# Patient Record
Sex: FEMALE | Race: WHITE | Employment: OTHER | URBAN - METROPOLITAN AREA
[De-identification: names, ages, dates, MRNs, and addresses within clinical notes are randomized per-mention and may not be internally consistent; named-entity substitution may affect disease eponyms.]

---

## 2017-02-01 ENCOUNTER — OFFICE VISIT (OUTPATIENT)
Dept: CARDIOLOGY CLINIC | Age: 72
End: 2017-02-01

## 2017-02-01 VITALS
OXYGEN SATURATION: 97 % | WEIGHT: 128.4 LBS | RESPIRATION RATE: 16 BRPM | BODY MASS INDEX: 21.39 KG/M2 | HEIGHT: 65 IN | HEART RATE: 62 BPM | SYSTOLIC BLOOD PRESSURE: 150 MMHG | DIASTOLIC BLOOD PRESSURE: 68 MMHG

## 2017-02-01 DIAGNOSIS — I25.10 ATHEROSCLEROSIS OF NATIVE CORONARY ARTERY OF NATIVE HEART WITHOUT ANGINA PECTORIS: Primary | ICD-10-CM

## 2017-02-01 DIAGNOSIS — J44.9 CHRONIC OBSTRUCTIVE PULMONARY DISEASE, UNSPECIFIED COPD TYPE (HCC): ICD-10-CM

## 2017-02-01 DIAGNOSIS — E78.2 MIXED HYPERLIPIDEMIA: ICD-10-CM

## 2017-02-01 RX ORDER — LAMOTRIGINE 25 MG/1
TABLET ORAL
Refills: 0 | COMMUNITY
Start: 2017-01-23 | End: 2017-02-21

## 2017-02-01 NOTE — PATIENT INSTRUCTIONS
Obtain fasting lipids, we will call you with results    Keep a blood pressure diary, notify us f consistently above 140    Follow up with Dr. Clemente Stephenson in 6 months

## 2017-02-01 NOTE — PROGRESS NOTES
Visit Vitals    /68 (BP 1 Location: Left arm, BP Patient Position: Sitting)    Pulse 62    Resp 16    Ht 5' 5\" (1.651 m)    Wt 128 lb 6.4 oz (58.2 kg)    SpO2 97%    BMI 21.37 kg/m2

## 2017-02-01 NOTE — PROGRESS NOTES
Sharolyn Landau     1945       Ofelia Ocampo MD, [facility]  Date of Visit-2/1/2017   PCP is Mya Pabon MD   [facility]  [facility]  HPI:  Sharolyn Landau is a 66 y.o. female   No complaints  SOB unchanged  Denies chest pain, edema, syncope  Has no tachycardia , palpitations or sense of arrythmia   EKG NSR WNL normal axis and intervals rate 62 qrs 435  Assessment/Plan:     CAD 3 years out from stents  --continue DAPT  --BB,ASA-nuke Jan 2016  lexiscan 2 mm Depression, no ischemia, EF 73%    HTN up today recheck i get about the same systolic 477 for me, pt will keep bp diary at home , let us know if >140 persistently    Lipids -FLP slip given  COPD chronic SOB URBANO  Fu 6 months     Impression:   1. Atherosclerosis of native coronary artery of native heart without angina pectoris    2. Mixed hyperlipidemia    3. Chronic obstructive pulmonary disease, unspecified COPD type ([address]    4. Elevated BP       Cardiac History:   NUKE 3-1-13= walked 5 minutes, STT 3 mm horizontal, normal perfusion, ef 70%  ECHO 3-1-13= 55-60%, mild MR,AR,TR, Aortic sclerosis without stenosis  Cardiac Cath 1-8-14-Successful GORDON pLAD (2.75x18 Xience). RFA mynx, RFV manual.     ROS-except as noted above. . A complete cardiac and respiratory are reviewed and negative except as above ; Resp-denies wheezing  or productive cough,. Const- No unusual weight loss or fever; Neuro-no recent seizure or CVA ; GI- No BRBPR, abdom pain, bloating ; - no  hematuria   see supplement sheet, initialed and to be scanned by staff  Past Medical History   Diagnosis Date    COPD (chronic obstructive pulmonary disease) ([facility] 1/7/2016    Coronary atherosclerosis of native coronary artery 2006     MI/Left ventricular wall motion is segmentally abnormal with mild hypokinesis of the anterior wall. Ejection Fraction is estimated at 50%. LAD >90% stenosis. LCX 50% stenosis.      Family history of suicide father    GERD (gastroesophageal reflux disease)     Glaucoma     Mixed hyperlipidemia     Osteopenia 2015     DEXA 2010, 2015    S/P angioplasty with stent      stent to LAD. 3X8mm Cypher. Social Hx= reports that she has never smoked. She has never used smokeless tobacco. She reports that she does not drink alcohol or use illicit drugs. Exam and Labs:    Visit Vitals    /68 (BP 1 Location: Left arm, BP Patient Position: Sitting)    Pulse 62    Resp 16    Ht 5' 5\" (1.651 m)    Wt 128 lb 6.4 oz (58.2 kg)    SpO2 97%    BMI 21.37 kg/m2      Constitutional:  NAD, comfortable  Head: NC,AT. Eyes: No scleral icterus. Neck:  Neck supple. No JVD present. Throat: moist mucous membranes. Chest: Effort normal & normal respiratory excursion . Neurological: alert, conversant and oriented . Skin: Skin is not cold. No obvious systemic rash noted. Not diaphoretic. No erythema. Psychiatric:  Grossly normal mood and affect. Behavior appears normal. Extremities:  no clubbing or cyanosis. Abdomen: non distended    Lungs:breath sounds normal. No stridor. distress, wheezes or  Rales. Heart:    normal rate, regular rhythm, normal S1, S2, no murmurs, rubs, clicks or gallops , PMI non displaced. Edema: Edema is none. Lab Results   Component Value Date/Time    Cholesterol, total 141 07/13/2015 09:17 AM    HDL Cholesterol 38 07/13/2015 09:17 AM    LDL, calculated 82 07/13/2015 09:17 AM    Triglyceride 104 07/13/2015 09:17 AM    CHOL/HDL Ratio 3.8 01/09/2014 04:20 AM        Wt Readings from Last 3 Encounters:   02/01/17 128 lb 6.4 oz (58.2 kg)   07/20/16 124 lb 3.2 oz (56.3 kg)   01/04/16 128 lb 3.2 oz (58.2 kg)      BP Readings from Last 3 Encounters:   02/01/17 150/68   07/20/16 138/62   01/04/16 130/60      Current Outpatient Prescriptions   Medication Sig    clopidogrel (PLAVIX) 75 mg tablet Take 1 Tab by mouth daily.     metoprolol tartrate (LOPRESSOR) 25 mg tablet TAKE 1 TABLET TWICE DAILY    simvastatin (ZOCOR) 40 mg tablet TAKE 1 TABLET EVERY NIGHT    famotidine (PEPCID) 20 mg tablet Take 20 mg by mouth two (2) times a day.  escitalopram oxalate (LEXAPRO) 20 mg tablet Take 20 mg by mouth daily.  Aspirin, Buffered 81 mg tab Take  by mouth.  calcium-cholecalciferol, d3, (CALCIUM 600 + D) 600-125 mg-unit tab Take 1 Tab by mouth two (2) times a day.  latanoprost (XALATAN) 0.005 % ophthalmic solution Administer 1 Drop to both eyes nightly.  lamoTRIgine (LAMICTAL) 25 mg tablet take 1 tablet by mouth at bedtime for 1 week then INCREASE to 2 tablets by mouth at bedtime     No current facility-administered medications for this visit. Impression see above.

## 2017-02-01 NOTE — MR AVS SNAPSHOT
Visit Information Date & Time Provider Department Dept. Phone Encounter #  
 2/1/2017  9:00 AM Sheldon Carr MD [facility] [phone] [id number] Your Appointments 8/16/2017  9:20 AM  
ESTABLISHED PATIENT with Sheldon Carr MD  
[facility] ([facility] SCHEDULING) Appt Note: 6 mo fu  
 [address] [facility] 600 1007 [address] [address] [address] Upcoming Health Maintenance Date Due DTaP/Tdap/Td series (1 - Tdap) 10/31/1964 FOBT Q 1 YEAR AGE 50-75 10/31/1993 ZOSTER VACCINE AGE 60> 10/31/2003 GLAUCOMA SCREENING Q2Y 10/31/2008 Pneumococcal 65+ Low/Medium Risk (1 of 2 - PCV13) 10/31/2008 INFLUENZA AGE 9 TO ADULT 8/1/2016 MEDICARE YEARLY EXAM 12/18/2016 BREAST CANCER SCRN MAMMOGRAM 8/24/2018 Allergies as of 2/1/2017  Review Complete On: 2/1/2017 By: Sheldon Carr MD  
 No Known Allergies Current Immunizations  Never Reviewed Name Date Influenza Vaccine 10/14/2015 Not reviewed this visit You Were Diagnosed With   
  
 Codes Comments Atherosclerosis of native coronary artery of native heart without angina pectoris    -  Primary ICD-10-CM: I25.10 ICD-9-CM: 414.01 Mixed hyperlipidemia     ICD-10-CM: E78.2 ICD-9-CM: 272.2 Vitals BP Pulse Resp Height(growth percentile) Weight(growth percentile) SpO2  
 150/68 (BP 1 Location: Left arm, BP Patient Position: Sitting) 62 16 5' 5\" (1.651 m) 128 lb 6.4 oz (58.2 kg) 97% BMI OB Status Smoking Status 21.37 kg/m2 Postmenopausal Never Smoker BMI and BSA Data Body Mass Index Body Surface Area  
 21.37 kg/m 2 1.63 m 2 Preferred Pharmacy Pharmacy Name Phone [address] [phone] Your Updated Medication List  
  
   
 This list is accurate as of: 2/1/17  9:45 AM.  Always use your most recent med list.  
  
  
  
  
 aspirin, buffered 81 mg Tab Take  by mouth. CALCIUM 600 + D 600-125 mg-unit Tab Generic drug:  calcium-cholecalciferol (d3) Take 1 Tab by mouth two (2) times a day. clopidogrel 75 mg Tab Commonly known as:  PLAVIX Take 1 Tab by mouth daily. famotidine 20 mg tablet Commonly known as:  PEPCID Take 20 mg by mouth two (2) times a day. lamoTRIgine 25 mg tablet Commonly known as: LaMICtal  
take 1 tablet by mouth at bedtime for 1 week then INCREASE to 2 tablets by mouth at bedtime  
  
 latanoprost 0.005 % ophthalmic solution Commonly known as:  Umu Noe Administer 1 Drop to both eyes nightly. LEXAPRO 20 mg tablet Generic drug:  escitalopram oxalate Take 20 mg by mouth daily. metoprolol tartrate 25 mg tablet Commonly known as:  LOPRESSOR  
TAKE 1 TABLET TWICE DAILY  
  
 simvastatin 40 mg tablet Commonly known as:  ZOCOR  
TAKE 1 TABLET EVERY NIGHT We Performed the Following AMB POC EKG ROUTINE W/ 12 LEADS, INTER & REP [64476 CPT(R)] LIPID PANEL [48294 CPT(R)] Patient Instructions Obtain fasting lipids, we will call you with results Keep a blood pressure diary, notify us f consistently above 140 Follow up with Dr. Jessica Blankenship in 6 months Introducing [facility]! Alisha Huertas introduces Active[employer] patient portal. Now you can access parts of your medical record, email your doctor's office, and request medication refills online. 1. In your internet browser, go to [email or web address] 2. Click on the First Time User? Click Here link in the Sign In box. You will see the New Member Sign Up page. 3. Enter your Active[employer]on Access Code exactly as it appears below. You will not need to use this code after youve completed the sign-up process.  If you do not sign up before the expiration date, you must request a new code. · Happy Elements Access Code: [email or web address] Expires: 4/24/2017  6:08 PM 
 
4. Enter the last four digits of your Social Security Number (xxxx) and Date of Birth (mm/dd/yyyy) as indicated and click Submit. You will be taken to the next sign-up page. 5. Create a Happy Elements ID. This will be your Happy Elements login ID and cannot be changed, so think of one that is secure and easy to remember. 6. Create a Happy Elements password. You can change your password at any time. 7. Enter your Password Reset Question and Answer. This can be used at a later time if you forget your password. 8. Enter your e-mail address. You will receive e-mail notification when new information is available in [address] 9. Click Sign Up. You can now view and download portions of your medical record. 10. Click the Download Summary menu link to download a portable copy of your medical information. If you have questions, please visit the Frequently Asked Questions section of the Happy Elements website. Remember, Happy Elements is NOT to be used for urgent needs. For medical emergencies, dial 911. Now available from your iPhone and Android! Please provide this summary of care documentation to your next provider. Your primary care clinician is listed as Buddy Lehman . If you have any questions after today's visit, please call [phone].

## 2017-02-20 ENCOUNTER — HOSPITAL ENCOUNTER (EMERGENCY)
Age: 72
Discharge: HOME | End: 2017-02-21
Attending: EMERGENCY MEDICINE
Payer: MEDICARE

## 2017-02-20 DIAGNOSIS — R21 MORBILLIFORM RASH: Primary | ICD-10-CM

## 2017-02-20 DIAGNOSIS — D72.819 LEUKOPENIA, UNSPECIFIED TYPE: ICD-10-CM

## 2017-02-20 DIAGNOSIS — T50.905A DRUG REACTION, INITIAL ENCOUNTER: ICD-10-CM

## 2017-02-20 LAB
ALBUMIN SERPL BCP-MCNC: 3.7 G/DL (ref 3.5–5)
ALBUMIN/GLOB SERPL: 1.4 {RATIO} (ref 1.1–2.2)
ALP SERPL-CCNC: 60 U/L (ref 45–117)
ALT SERPL-CCNC: 58 U/L (ref 12–78)
ANION GAP BLD CALC-SCNC: 7 MMOL/L (ref 5–15)
AST SERPL W P-5'-P-CCNC: 40 U/L (ref 15–37)
BASOPHILS # BLD AUTO: 0 K/UL (ref 0–0.1)
BASOPHILS # BLD: 1 % (ref 0–1)
BILIRUB SERPL-MCNC: 0.8 MG/DL (ref 0.2–1)
BUN SERPL-MCNC: 13 MG/DL (ref 6–20)
BUN/CREAT SERPL: 15 (ref 12–20)
CALCIUM SERPL-MCNC: 8.9 MG/DL (ref 8.5–10.1)
CHLORIDE SERPL-SCNC: 105 MMOL/L (ref 97–108)
CO2 SERPL-SCNC: 30 MMOL/L (ref 21–32)
CREAT SERPL-MCNC: 0.89 MG/DL (ref 0.55–1.02)
CRP SERPL-MCNC: 2.89 MG/DL
DEPRECATED S PYO AG THROAT QL EIA: NEGATIVE
DIFFERENTIAL METHOD BLD: ABNORMAL
EOSINOPHIL # BLD: 0.2 K/UL (ref 0–0.4)
EOSINOPHIL NFR BLD: 8 % (ref 0–7)
ERYTHROCYTE [DISTWIDTH] IN BLOOD BY AUTOMATED COUNT: 15 % (ref 11.5–14.5)
ERYTHROCYTE [SEDIMENTATION RATE] IN BLOOD: 17 MM/HR (ref 0–30)
GLOBULIN SER CALC-MCNC: 2.7 G/DL (ref 2–4)
GLUCOSE SERPL-MCNC: 100 MG/DL (ref 65–100)
HCT VFR BLD AUTO: 33.7 % (ref 35–47)
HGB BLD-MCNC: 10.9 G/DL (ref 11.5–16)
LYMPHOCYTES # BLD AUTO: 17 % (ref 12–49)
LYMPHOCYTES # BLD: 0.5 K/UL (ref 0.8–3.5)
MCH RBC QN AUTO: 31.5 PG (ref 26–34)
MCHC RBC AUTO-ENTMCNC: 32.3 G/DL (ref 30–36.5)
MCV RBC AUTO: 97.4 FL (ref 80–99)
MONOCYTES # BLD: 0.3 K/UL (ref 0–1)
MONOCYTES NFR BLD AUTO: 10 % (ref 5–13)
NEUTS SEG # BLD: 1.8 K/UL (ref 1.8–8)
NEUTS SEG NFR BLD AUTO: 64 % (ref 32–75)
PLATELET # BLD AUTO: 130 K/UL (ref 150–400)
POTASSIUM SERPL-SCNC: 4 MMOL/L (ref 3.5–5.1)
PROT SERPL-MCNC: 6.4 G/DL (ref 6.4–8.2)
RBC # BLD AUTO: 3.46 M/UL (ref 3.8–5.2)
RBC MORPH BLD: ABNORMAL
SODIUM SERPL-SCNC: 142 MMOL/L (ref 136–145)
WBC # BLD AUTO: 2.8 K/UL (ref 3.6–11)

## 2017-02-20 PROCEDURE — 36415 COLL VENOUS BLD VENIPUNCTURE: CPT | Performed by: EMERGENCY MEDICINE

## 2017-02-20 PROCEDURE — 87070 CULTURE OTHR SPECIMN AEROBIC: CPT | Performed by: EMERGENCY MEDICINE

## 2017-02-20 PROCEDURE — 74011250636 HC RX REV CODE- 250/636: Performed by: EMERGENCY MEDICINE

## 2017-02-20 PROCEDURE — 80053 COMPREHEN METABOLIC PANEL: CPT | Performed by: EMERGENCY MEDICINE

## 2017-02-20 PROCEDURE — 85652 RBC SED RATE AUTOMATED: CPT | Performed by: EMERGENCY MEDICINE

## 2017-02-20 PROCEDURE — 85025 COMPLETE CBC W/AUTO DIFF WBC: CPT | Performed by: EMERGENCY MEDICINE

## 2017-02-20 PROCEDURE — 87880 STREP A ASSAY W/OPTIC: CPT | Performed by: EMERGENCY MEDICINE

## 2017-02-20 PROCEDURE — 96374 THER/PROPH/DIAG INJ IV PUSH: CPT

## 2017-02-20 PROCEDURE — 99283 EMERGENCY DEPT VISIT LOW MDM: CPT

## 2017-02-20 PROCEDURE — 86140 C-REACTIVE PROTEIN: CPT | Performed by: EMERGENCY MEDICINE

## 2017-02-20 RX ORDER — DIPHENHYDRAMINE HYDROCHLORIDE 50 MG/ML
25 INJECTION, SOLUTION INTRAMUSCULAR; INTRAVENOUS ONCE
Status: COMPLETED | OUTPATIENT
Start: 2017-02-20 | End: 2017-02-20

## 2017-02-20 RX ADMIN — DIPHENHYDRAMINE HYDROCHLORIDE 25 MG: 50 INJECTION, SOLUTION INTRAMUSCULAR; INTRAVENOUS at 23:49

## 2017-02-21 VITALS
HEART RATE: 80 BPM | DIASTOLIC BLOOD PRESSURE: 71 MMHG | HEIGHT: 65 IN | OXYGEN SATURATION: 98 % | WEIGHT: 128.31 LBS | RESPIRATION RATE: 16 BRPM | TEMPERATURE: 98.3 F | BODY MASS INDEX: 21.38 KG/M2 | SYSTOLIC BLOOD PRESSURE: 156 MMHG

## 2017-02-21 PROCEDURE — 74011250637 HC RX REV CODE- 250/637: Performed by: EMERGENCY MEDICINE

## 2017-02-21 RX ORDER — HYDROXYZINE 25 MG/1
25 TABLET, FILM COATED ORAL
Status: COMPLETED | OUTPATIENT
Start: 2017-02-21 | End: 2017-02-21

## 2017-02-21 RX ORDER — HYDROXYZINE 25 MG/1
25 TABLET, FILM COATED ORAL
Qty: 20 TAB | Refills: 0 | Status: SHIPPED | OUTPATIENT
Start: 2017-02-21 | End: 2017-02-26

## 2017-02-21 RX ADMIN — HYDROXYZINE HYDROCHLORIDE 25 MG: 25 TABLET, FILM COATED ORAL at 01:00

## 2017-02-21 NOTE — ED NOTES
Patient resting on the stretcher. Currently without complaints. Call bell within reach; will continue to monitor.  Son at bedside

## 2017-02-21 NOTE — DISCHARGE INSTRUCTIONS
Drug Allergy: Care Instructions  Your Care Instructions  A drug allergy occurs when your immune system overreacts to something in a medicine. This causes an allergic reaction. You may have skin problems, such as hives or a rash. Your lips, mouth, and throat may swell. You may have trouble breathing. And you may have belly pain, nausea, vomiting, or diarrhea. A reaction can range from mild to life-threatening. After you have an allergic reaction to a medicine, you may always be allergic to that medicine and to others like it. Drug allergies are different than side effects and drug interactions. Side effects are bad reactions to a medicine. Drug interactions occur when two or more medicines that you take do not get along in your body. Some people may confuse these with drug allergies. Follow-up care is a key part of your treatment and safety. Be sure to make and go to all appointments, and call your doctor if you are having problems. It's also a good idea to know your test results and keep a list of the medicines you take. How can you care for yourself at home? · Your doctor may prescribe a shot of epinephrine to carry with you in case you have a severe reaction. Learn how to give yourself the shot, and keep it with you at all times. Make sure it has not . · Go to the emergency room every time you have a severe reaction. Go even if you have used your shot of epinephrine and are feeling better. Symptoms can come back after a shot. · Be safe with medicines. If you were given a medicine for your allergic reaction, take it exactly as directed. Call your doctor if you think you are having a problem with your medicine. · Avoid medicines like the one that caused your allergy. Ask your doctor or pharmacist if you think you may be taking a similar medicine. · If you have a mild skin rash or itching from your allergy:  ¨ Wear light clothing that does not irritate your skin. ¨ Use calamine lotion.  Or take an over-the-counter antihistamine, such as diphenhydramine (Benadryl) or loratadine (Claritin). Read and follow the instructions on the label. ¨ Take a cool shower or bath. ¨ Do not use strong soaps, detergents, and other chemicals. They can make itching worse. · Wear medical alert jewelry that lists your allergies. You can buy this at most drugstores. · Be sure that anyone treating you for any health problem knows that you are allergic to this medicine. When should you call for help? Give an epinephrine shot if:  · You think you are having a severe allergic reaction. · You have symptoms in more than one body area, such as mild nausea and an itchy mouth. After giving an epinephrine shot call 911, even if you feel better. Call 911 if:  · You have symptoms of a severe allergic reaction. These may include:  ¨ Sudden raised, red areas (hives) all over your body. ¨ Swelling of the throat, mouth, lips, or tongue. ¨ Trouble breathing. ¨ Passing out (losing consciousness). Or you may feel very lightheaded or suddenly feel weak, confused, or restless. · You have been given an epinephrine shot, even if you feel better. Call your doctor now or seek immediate medical care if:  · You have symptoms of an allergic reaction, such as:  ¨ A rash or hives (raised, red areas on the skin). ¨ Itching. ¨ Swelling. ¨ Belly pain, nausea, or vomiting. Watch closely for changes in your health, and be sure to contact your doctor if:  · You do not get better as expected. Where can you learn more? Go to http://tanya-mk.info/. Enter K398 in the search box to learn more about \"Drug Allergy: Care Instructions. \"  Current as of: February 12, 2016  Content Version: 11.1  © 3342-4920 [employer]. Care instructions adapted under license by [employer] (which disclaims liability or warranty for this information).  If you have questions about a medical condition or this instruction, always ask your healthcare professional. [place] [address] any warranty or liability for your use of this information. Rash: Care Instructions  Your Care Instructions  A rash is any irritation or inflammation of the skin. Rashes have many possible causes, including allergy, infection, illness, heat, and emotional stress. Follow-up care is a key part of your treatment and safety. Be sure to make and go to all appointments, and call your doctor if you are having problems. Its also a good idea to know your test results and keep a list of the medicines you take. How can you care for yourself at home? · Wash the area with water only. Soap can make dryness and itching worse. Pat dry. · Put cold, wet cloths on the rash to reduce itching. · Keep cool, and stay out of the sun. · Leave the rash open to the air as much of the time as possible. · Sometimes petroleum jelly (Vaseline) can help relieve the discomfort caused by a rash. A moisturizing lotion, such as Cetaphil, also may help. Calamine lotion may help for rashes caused by contact with something (such as a plant or soap) that irritated the skin. Use it 3 or 4 times a day. · If your doctor prescribed a cream, use it as directed. If your doctor prescribed medicine, take it exactly as directed. · If your rash itches so badly that it interferes with your normal activities, take an over-the-counter antihistamine, such as diphenhydramine (Benadryl) or loratadine (Claritin). Read and follow all instructions on the label. When should you call for help? Call your doctor now or seek immediate medical care if:  · You have signs of infection, such as:  ¨ Increased pain, swelling, warmth, or redness. ¨ Red streaks leading from the area. ¨ Pus draining from the area. ¨ A fever. · You have joint pain along with the rash. Watch closely for changes in your health, and be sure to contact your doctor if:  · Your rash is changing or getting worse.  For example, call if you have pain along with the rash, the rash is spreading, or you have new blisters. · You do not get better after 1 week. Where can you learn more? Go to http://tanya-mk.info/. Enter A309 in the search box to learn more about \"Rash: Care Instructions. \"  Current as of: February 5, 2016  Content Version: 11.1  © 5950-1090 [employer]. Care instructions adapted under license by [employer] (which disclaims liability or warranty for this information). If you have questions about a medical condition or this instruction, always ask your healthcare professional. [place] [address] any warranty or liability for your use of this information. We hope that we have addressed all of your medical concerns. The examination and treatment you received in the Emergency Department were for an emergent problem and were not intended as complete care. It is important that you follow up with your healthcare provider(s) for ongoing care. If your symptoms worsen or do not improve as expected, and you are unable to reach your usual health care provider(s), you should return to the Emergency Department. Today's healthcare is undergoing tremendous change, and patient satisfaction surveys are one of the many tools to assess the quality of medical care. You may receive a survey from the [employer] regarding your experience in the Emergency Department. I hope that your experience has been completely positive, particularly the medical care that I provided. As such, please participate in the survey; anything less than excellent does not meet my expectations or intentions. [facility] and [facility] participate in nationally recognized quality of care measures.   If your blood pressure is greater than 120/80, as reported below, we urge that you seek medical care to address the potential of high blood pressure, commonly known as hypertension. Hypertension can be hereditary or can be caused by certain medical conditions, pain, stress, or \"white coat syndrome. \"       Please make an appointment with your health care provider(s) for follow up of your Emergency Department visit. VITALS:   Patient Vitals for the past 8 hrs:   Temp Pulse Resp BP SpO2   02/20/17 2239 - 74 - 149/67 97 %   02/20/17 1955 98.3 °F (36.8 °C) 83 20 162/72 98 %          Thank you for allowing us to provide you with medical care today. We realize that you have many choices for your emergency care needs. Please choose us in the future for any continued health care needs. Shelby Saldana, [address] [place] [address] 20.   Office: [phone]            Recent Results (from the past 24 hour(s))   STREP AG SCREEN, GROUP A    Collection Time: 02/20/17  8:32 PM   Result Value Ref Range    Group A Strep Ag ID NEGATIVE  NEG     CBC WITH AUTOMATED DIFF    Collection Time: 02/20/17  8:32 PM   Result Value Ref Range    WBC 2.8 (L) 3.6 - 11.0 K/uL    RBC 3.46 (L) 3.80 - 5.20 M/uL    HGB 10.9 (L) 11.5 - 16.0 g/dL    HCT 33.7 (L) 35.0 - 47.0 %    MCV 97.4 80.0 - 99.0 FL    MCH 31.5 26.0 - 34.0 PG    MCHC 32.3 30.0 - 36.5 g/dL    RDW 15.0 (H) 11.5 - 14.5 %    PLATELET 564 (L) 777 - 400 K/uL    NEUTROPHILS 64 32 - 75 %    LYMPHOCYTES 17 12 - 49 %    MONOCYTES 10 5 - 13 %    EOSINOPHILS 8 (H) 0 - 7 %    BASOPHILS 1 0 - 1 %    ABS. NEUTROPHILS 1.8 1.8 - 8.0 K/UL    ABS. LYMPHOCYTES 0.5 (L) 0.8 - 3.5 K/UL    ABS. MONOCYTES 0.3 0.0 - 1.0 K/UL    ABS. EOSINOPHILS 0.2 0.0 - 0.4 K/UL    ABS.  BASOPHILS 0.0 0.0 - 0.1 K/UL    DF SMEAR SCANNED      RBC COMMENTS ANISOCYTOSIS  1+        RBC COMMENTS MACROCYTOSIS  1+        RBC COMMENTS POLYCHROMASIA  1+        RBC COMMENTS BASOPHILIC STIPPLING  PRESENT        RBC COMMENTS OVALOCYTES  PRESENT       METABOLIC PANEL, COMPREHENSIVE    Collection Time: 02/20/17  8:32 PM Result Value Ref Range    Sodium 142 136 - 145 mmol/L    Potassium 4.0 3.5 - 5.1 mmol/L    Chloride 105 97 - 108 mmol/L    CO2 30 21 - 32 mmol/L    Anion gap 7 5 - 15 mmol/L    Glucose 100 65 - 100 mg/dL    BUN 13 6 - 20 MG/DL    Creatinine 0.89 0.55 - 1.02 MG/DL    BUN/Creatinine ratio 15 12 - 20      GFR est AA >60 >60 ml/min/1.73m2    GFR est non-AA >60 >60 ml/min/1.73m2    Calcium 8.9 8.5 - 10.1 MG/DL    Bilirubin, total 0.8 0.2 - 1.0 MG/DL    ALT (SGPT) 58 12 - 78 U/L    AST (SGOT) 40 (H) 15 - 37 U/L    Alk. phosphatase 60 45 - 117 U/L    Protein, total 6.4 6.4 - 8.2 g/dL    Albumin 3.7 3.5 - 5.0 g/dL    Globulin 2.7 2.0 - 4.0 g/dL    A-G Ratio 1.4 1.1 - 2.2     SED RATE (ESR)    Collection Time: 02/20/17  8:32 PM   Result Value Ref Range    Sed rate, automated 17 0 - 30 mm/hr   C REACTIVE PROTEIN, QT    Collection Time: 02/20/17  8:32 PM   Result Value Ref Range    C-Reactive protein 2.89 (H) <0.60 mg/dL       No results found.

## 2017-02-21 NOTE — ED TRIAGE NOTES
Patient began with a red itchy rash on her face and chest on Friday. It progressed to her entire upper front and back of her body over the weekend. She has taken 4 doses of Benadryl. No steroids yet. Also, c/o sore throat today.

## 2017-02-21 NOTE — ED NOTES
Bedside shift change report given to Андрей Theodore RN (oncoming nurse) by JACK LOPEZ (offgoing nurse). Report included the following information SBAR.

## 2017-02-21 NOTE — ED PROVIDER NOTES
HPI Comments: Scot Trevino is a 65 yo F with itching rash and sore throat. She states that the rash started 3 days ago on her upper chest and has spread to her back , face, arms and legs. The rash is very itchy and she has been taking bendryl for it but it did not seem to help. She went to [facility] today and was referred to the ED due to concern for erysipelas. She denies fever or chills, abdominal pain, chest pain, cough or shortness of breath. She was started on Lamictal 25 days ago. Past Medical History:   Diagnosis Date    COPD (chronic obstructive pulmonary disease) ([facility] [address] 2016    Coronary atherosclerosis of native coronary artery 2006     MI/Left ventricular wall motion is segmentally abnormal with mild hypokinesis of the anterior wall. Ejection Fraction is estimated at 50%. LAD >90% stenosis. LCX 50% stenosis.  Elevated BP 2017    Family history of suicide      father    GERD (gastroesophageal reflux disease)     Glaucoma     Mixed hyperlipidemia     Osteopenia      DEXA ,     S/P angioplasty with stent      stent to LAD. 3X8mm Cypher. Past Surgical History:   Procedure Laterality Date    Hx ptca  2006     stent to LAD. 3X8mm Cypher.  Hx colonoscopy       reportedly normal    Hx endoscopy       reportedly normal    Hx heart catheterization       Left ventricular wall motion is segmentally abnormal with mild hypokinesis of the anterior wall. Ejection Fraction is estimated at 50%. LAD >90% stenosis. LCX 50% stenosis.      Hx heart catheterization       2 stents placed    Hx  section      Hx orthopaedic  8/10/15     hip replacement    Us guided core breast biopsy Left 2010         Family History:   Problem Relation Age of Onset    Suicide Father     Heart Disease Sister       61    Cancer Brother      bladder cancer       Social History     Social History    Marital status:      Spouse name: N/A    Number of children: N/A    Years of education: N/A     Occupational History    Not on file. Social History Main Topics    Smoking status: Never Smoker    Smokeless tobacco: Never Used    Alcohol use No    Drug use: No    Sexual activity: No     Other Topics Concern    Not on file     Social History Narrative         ALLERGIES: Review of patient's allergies indicates no known allergies. Review of Systems   Constitutional: Negative for fever. HENT: Positive for sore throat. Eyes: Negative for visual disturbance. Respiratory: Negative for cough. Cardiovascular: Negative for chest pain. Gastrointestinal: Negative for abdominal pain. Genitourinary: Negative for dysuria. Musculoskeletal: Negative for back pain. Skin: Positive for rash. Neurological: Negative for headaches. Vitals:    02/20/17 1955 02/20/17 2239 02/21/17 0111   BP: 162/72 149/67 156/71   Pulse: 83 74 80   Resp: 20  16   Temp: 98.3 °F (36.8 °C)     SpO2: 98% 97% 98%   Weight: 58.2 kg (128 lb 4.9 oz)     Height: 5' 5\" (1.651 m)              Physical Exam   Constitutional: She appears well-developed and well-nourished. No distress. HENT:   Head: Normocephalic and atraumatic. Mouth/Throat: Oropharynx is clear and moist.   Eyes: Conjunctivae and EOM are normal.   Neck: Normal range of motion and phonation normal.   Cardiovascular: Normal rate and intact distal pulses. Pulmonary/Chest: Effort normal. No respiratory distress. Abdominal: She exhibits no distension. Musculoskeletal: Normal range of motion. She exhibits no tenderness. Neurological: She is alert. She is not disoriented. She exhibits normal muscle tone. Skin: Skin is warm and dry. Rash noted. Rash is maculopapular. Nursing note and vitals reviewed. MDM   Pruritic, Morbiliform rash with no fever. Recently  Started on lamotrigine within the past 4 weeks. Given sore throat also consider strep.  Will check labs, observe  ED Course     12:46 AM  Patient reassessed and itching has improved somewhat with IV benadryl. Will give hydroxyzine PO and reassess. Labs significant for normal ESR, elevated CRP 2.89. Normal LFT and renal function. CBC significant for Leukopenia with normal differential. Do not suspect DRESS with no fever or end organ involvement. Also do not suspect erysipelas as rash appears more morbiliform and not confluent, aslo no fever.   Recommend stopping lamotirigine and follow-up with PCP to discuss replacement medicine and monitor WBC and follow -up with dermatologist.   Procedures

## 2017-02-21 NOTE — ED NOTES
Patient does not want a warm blanket at this point. Patient resting on the stretcher. Currently without complaints. Call bell within reach; will continue to monitor.

## 2017-02-21 NOTE — ED NOTES
Patient given all discharge paperwork and rx and all questions answered and phone numbers highlighted. Patient seen ambulating out of the ED with steady gait and all belongings. Patient in no apparent distress at this time. Levar Chua

## 2017-02-23 LAB
BACTERIA SPEC CULT: NORMAL
SERVICE CMNT-IMP: NORMAL

## 2017-02-24 LAB
CREATININE, EXTERNAL: 0.76
LDL-C, EXTERNAL: 53

## 2017-02-25 ENCOUNTER — APPOINTMENT (OUTPATIENT)
Dept: CT IMAGING | Age: 72
End: 2017-02-25
Attending: NURSE PRACTITIONER
Payer: MEDICARE

## 2017-02-25 ENCOUNTER — HOSPITAL ENCOUNTER (EMERGENCY)
Age: 72
Discharge: HOME | End: 2017-02-25
Attending: EMERGENCY MEDICINE
Payer: MEDICARE

## 2017-02-25 VITALS
RESPIRATION RATE: 16 BRPM | WEIGHT: 126.54 LBS | HEART RATE: 13 BPM | TEMPERATURE: 100.1 F | OXYGEN SATURATION: 92 % | DIASTOLIC BLOOD PRESSURE: 60 MMHG | HEIGHT: 65 IN | BODY MASS INDEX: 21.08 KG/M2 | SYSTOLIC BLOOD PRESSURE: 130 MMHG

## 2017-02-25 DIAGNOSIS — T50.905D MEDICATION REACTION, SUBSEQUENT ENCOUNTER: ICD-10-CM

## 2017-02-25 DIAGNOSIS — R41.89 PSEUDODEMENTIA: ICD-10-CM

## 2017-02-25 DIAGNOSIS — R21 RASH: Primary | ICD-10-CM

## 2017-02-25 DIAGNOSIS — R42 DIZZINESS: ICD-10-CM

## 2017-02-25 LAB
ALBUMIN SERPL BCP-MCNC: 3.4 G/DL (ref 3.5–5)
ALBUMIN/GLOB SERPL: 1.2 {RATIO} (ref 1.1–2.2)
ALP SERPL-CCNC: 52 U/L (ref 45–117)
ALT SERPL-CCNC: 29 U/L (ref 12–78)
AMPHET UR QL SCN: NEGATIVE
ANION GAP BLD CALC-SCNC: 11 MMOL/L (ref 5–15)
APPEARANCE UR: CLEAR
AST SERPL W P-5'-P-CCNC: 15 U/L (ref 15–37)
BACTERIA URNS QL MICRO: NEGATIVE /HPF
BARBITURATES UR QL SCN: NEGATIVE
BASOPHILS # BLD AUTO: 0 K/UL (ref 0–0.1)
BASOPHILS # BLD: 0 % (ref 0–1)
BENZODIAZ UR QL: NEGATIVE
BILIRUB SERPL-MCNC: 0.9 MG/DL (ref 0.2–1)
BILIRUB UR QL CFM: NEGATIVE
BUN SERPL-MCNC: 14 MG/DL (ref 6–20)
BUN/CREAT SERPL: 13 (ref 12–20)
CALCIUM SERPL-MCNC: 8.4 MG/DL (ref 8.5–10.1)
CANNABINOIDS UR QL SCN: NEGATIVE
CHLORIDE SERPL-SCNC: 101 MMOL/L (ref 97–108)
CO2 SERPL-SCNC: 26 MMOL/L (ref 21–32)
COCAINE UR QL SCN: NEGATIVE
COLOR UR: ABNORMAL
CREAT SERPL-MCNC: 1.06 MG/DL (ref 0.55–1.02)
CRP SERPL-MCNC: 14.03 MG/DL
DIFFERENTIAL METHOD BLD: ABNORMAL
DRUG SCRN COMMENT,DRGCM: NORMAL
EOSINOPHIL # BLD: 0.7 K/UL (ref 0–0.4)
EOSINOPHIL NFR BLD: 8 % (ref 0–7)
EPITH CASTS URNS QL MICRO: ABNORMAL /LPF
ERYTHROCYTE [DISTWIDTH] IN BLOOD BY AUTOMATED COUNT: 15.7 % (ref 11.5–14.5)
ERYTHROCYTE [SEDIMENTATION RATE] IN BLOOD: 21 MM/HR (ref 0–30)
GLOBULIN SER CALC-MCNC: 2.9 G/DL (ref 2–4)
GLUCOSE SERPL-MCNC: 139 MG/DL (ref 65–100)
GLUCOSE UR STRIP.AUTO-MCNC: NEGATIVE MG/DL
HCT VFR BLD AUTO: 37.8 % (ref 35–47)
HGB BLD-MCNC: 11.9 G/DL (ref 11.5–16)
HGB UR QL STRIP: NEGATIVE
HYALINE CASTS URNS QL MICRO: ABNORMAL /LPF (ref 0–5)
KETONES UR QL STRIP.AUTO: ABNORMAL MG/DL
LEUKOCYTE ESTERASE UR QL STRIP.AUTO: NEGATIVE
LYMPHOCYTES # BLD AUTO: 4 % (ref 12–49)
LYMPHOCYTES # BLD: 0.4 K/UL (ref 0.8–3.5)
MCH RBC QN AUTO: 30.4 PG (ref 26–34)
MCHC RBC AUTO-ENTMCNC: 31.5 G/DL (ref 30–36.5)
MCV RBC AUTO: 96.4 FL (ref 80–99)
METHADONE UR QL: NEGATIVE
MONOCYTES # BLD: 0.5 K/UL (ref 0–1)
MONOCYTES NFR BLD AUTO: 6 % (ref 5–13)
NEUTS SEG # BLD: 7.4 K/UL (ref 1.8–8)
NEUTS SEG NFR BLD AUTO: 82 % (ref 32–75)
NITRITE UR QL STRIP.AUTO: NEGATIVE
OPIATES UR QL: NEGATIVE
PCP UR QL: NEGATIVE
PH UR STRIP: 5.5 [PH] (ref 5–8)
PLATELET # BLD AUTO: 164 K/UL (ref 150–400)
POTASSIUM SERPL-SCNC: 3.9 MMOL/L (ref 3.5–5.1)
PROT SERPL-MCNC: 6.3 G/DL (ref 6.4–8.2)
PROT UR STRIP-MCNC: 30 MG/DL
RBC # BLD AUTO: 3.92 M/UL (ref 3.8–5.2)
RBC #/AREA URNS HPF: ABNORMAL /HPF (ref 0–5)
RBC MORPH BLD: ABNORMAL
SODIUM SERPL-SCNC: 138 MMOL/L (ref 136–145)
SP GR UR REFRACTOMETRY: 1.02 (ref 1–1.03)
TSH SERPL DL<=0.05 MIU/L-ACNC: 0.83 UIU/ML (ref 0.36–3.74)
UA: UC IF INDICATED,UAUC: ABNORMAL
UROBILINOGEN UR QL STRIP.AUTO: 1 EU/DL (ref 0.2–1)
WBC # BLD AUTO: 9 K/UL (ref 3.6–11)
WBC URNS QL MICRO: ABNORMAL /HPF (ref 0–4)

## 2017-02-25 PROCEDURE — 86140 C-REACTIVE PROTEIN: CPT | Performed by: NURSE PRACTITIONER

## 2017-02-25 PROCEDURE — 74011250637 HC RX REV CODE- 250/637: Performed by: NURSE PRACTITIONER

## 2017-02-25 PROCEDURE — 81001 URINALYSIS AUTO W/SCOPE: CPT | Performed by: NURSE PRACTITIONER

## 2017-02-25 PROCEDURE — 70450 CT HEAD/BRAIN W/O DYE: CPT

## 2017-02-25 PROCEDURE — 96374 THER/PROPH/DIAG INJ IV PUSH: CPT

## 2017-02-25 PROCEDURE — 96361 HYDRATE IV INFUSION ADD-ON: CPT

## 2017-02-25 PROCEDURE — 99284 EMERGENCY DEPT VISIT MOD MDM: CPT

## 2017-02-25 PROCEDURE — 74011250636 HC RX REV CODE- 250/636: Performed by: NURSE PRACTITIONER

## 2017-02-25 PROCEDURE — 85025 COMPLETE CBC W/AUTO DIFF WBC: CPT | Performed by: NURSE PRACTITIONER

## 2017-02-25 PROCEDURE — 80175 DRUG SCREEN QUAN LAMOTRIGINE: CPT | Performed by: NURSE PRACTITIONER

## 2017-02-25 PROCEDURE — 80307 DRUG TEST PRSMV CHEM ANLYZR: CPT | Performed by: NURSE PRACTITIONER

## 2017-02-25 PROCEDURE — 96375 TX/PRO/DX INJ NEW DRUG ADDON: CPT

## 2017-02-25 PROCEDURE — 84443 ASSAY THYROID STIM HORMONE: CPT | Performed by: NURSE PRACTITIONER

## 2017-02-25 PROCEDURE — 36415 COLL VENOUS BLD VENIPUNCTURE: CPT | Performed by: NURSE PRACTITIONER

## 2017-02-25 PROCEDURE — 85652 RBC SED RATE AUTOMATED: CPT | Performed by: NURSE PRACTITIONER

## 2017-02-25 PROCEDURE — 80053 COMPREHEN METABOLIC PANEL: CPT | Performed by: NURSE PRACTITIONER

## 2017-02-25 RX ORDER — HYDROCORTISONE 25 MG/G
CREAM TOPICAL 2 TIMES DAILY
Qty: 30 G | Refills: 0 | Status: SHIPPED | OUTPATIENT
Start: 2017-02-25 | End: 2017-04-24

## 2017-02-25 RX ORDER — MECLIZINE HYDROCHLORIDE 25 MG/1
25 TABLET ORAL
Status: COMPLETED | OUTPATIENT
Start: 2017-02-25 | End: 2017-02-25

## 2017-02-25 RX ORDER — PREDNISONE 10 MG/1
TABLET ORAL
Qty: 21 TAB | Refills: 0 | Status: SHIPPED | OUTPATIENT
Start: 2017-02-25 | End: 2017-04-24 | Stop reason: ALTCHOICE

## 2017-02-25 RX ORDER — MECLIZINE HCL 12.5 MG 12.5 MG/1
12.5 TABLET ORAL
Qty: 30 TAB | Refills: 0 | Status: SHIPPED | OUTPATIENT
Start: 2017-02-25 | End: 2017-03-07

## 2017-02-25 RX ORDER — DIPHENHYDRAMINE HYDROCHLORIDE 50 MG/ML
25 INJECTION, SOLUTION INTRAMUSCULAR; INTRAVENOUS
Status: COMPLETED | OUTPATIENT
Start: 2017-02-25 | End: 2017-02-25

## 2017-02-25 RX ADMIN — MECLIZINE 25 MG: 25 TABLET ORAL at 16:57

## 2017-02-25 RX ADMIN — SODIUM CHLORIDE 1000 ML: 900 INJECTION, SOLUTION INTRAVENOUS at 16:10

## 2017-02-25 RX ADMIN — DIPHENHYDRAMINE HYDROCHLORIDE 25 MG: 50 INJECTION, SOLUTION INTRAMUSCULAR; INTRAVENOUS at 16:10

## 2017-02-25 RX ADMIN — METHYLPREDNISOLONE SODIUM SUCCINATE 125 MG: 125 INJECTION, POWDER, FOR SOLUTION INTRAMUSCULAR; INTRAVENOUS at 16:11

## 2017-02-25 NOTE — ED TRIAGE NOTES
Pt. C/o rash all over body for the past 2 weeks, seen at [facility] on Sunday for allergic reaction to new med started in January - Lamictal.   C/o feeling \"warm\" and chills, not eating well over the past 2 days and decreased energy with dizziness.

## 2017-02-25 NOTE — ED PROVIDER NOTES
HPI Comments: Aayush Joiner is a 66 y.o. female with Hx of pseudodementia, anxiety, CAD, HLD, and mood d/o who presents ambulatory with family to [facility] ED with cc of dizziness, confusion, rash, feeling hot/cold, and decreased appetite over last 2 days. Pt evaluated at [facility] on Sunday of last week for suspected drug rash from Lamictal. Pt started taking medication in January for mood d/o. She abruptly developed a full body rash last week. Advised to stop Lamictal, pt reports she has done this, but did not obtain Derm or PCP f/u as urged on her d/c paperwork. She reports chronic HA, now feels dizzy. States dizziness is not affected by positional changes. She reports that she also felt confused today. Granddaughter states pt seemed confused on the phone earlier today and she came to see the patient and Jyoti Vaca was not herself. \" Pt reports no appetite, but denies any nausea, vomiting, diarrhea, constipation, or abdominal pain. [address] daughter states Jyoti Vaca just won't eat. \" Pt has access to food. She reports the patient is normally quite active but notes patient has been sleeping more and not as active as well this week. Pt denies any weakness, reports she does not feel she has energy. Pt reports chills, but no documented fevers. She states the rash continues to itch and reports she has intermittently taken Benadryl with relief of itching. Notes she has also taken Atarax as well. Pt denies any urinary symptoms. She denies any bowel symptoms. She denies any focal weakness, numbness, or tingling. She denies any CP, SOB, palpitations. PCP: Rodger Estrada MD    There are no other complaints, changes or physical findings at this time. The history is provided by the patient.         Past Medical History:   Diagnosis Date    COPD (chronic obstructive pulmonary disease) ([facility] [address] 1/7/2016    Coronary atherosclerosis of native coronary artery 2006    MI/Left ventricular wall motion is segmentally abnormal with mild hypokinesis of the anterior wall. Ejection Fraction is estimated at 50%. LAD >90% stenosis. LCX 50% stenosis.  Elevated BP 2017    Family history of suicide     father    GERD (gastroesophageal reflux disease)     Glaucoma     Mixed hyperlipidemia     Osteopenia 2015    DEXA ,     S/P angioplasty with stent     stent to LAD. 3X8mm Cypher. Past Surgical History:   Procedure Laterality Date    HX  SECTION  1    HX COLONOSCOPY      reportedly normal    HX ENDOSCOPY      reportedly normal    HX HEART CATHETERIZATION      Left ventricular wall motion is segmentally abnormal with mild hypokinesis of the anterior wall. Ejection Fraction is estimated at 50%. LAD >90% stenosis. LCX 50% stenosis.  HX HEART CATHETERIZATION      2 stents placed    HX ORTHOPAEDIC  8/10/15    hip replacement    HX PTCA      stent to LAD. 3X8mm Cypher.  US GUIDED CORE BREAST BIOPSY Left          Family History:   Problem Relation Age of Onset    Suicide Father     Heart Disease Sister       61    Cancer Brother      bladder cancer       Social History     Social History    Marital status:      Spouse name: N/A    Number of children: N/A    Years of education: N/A     Occupational History    Not on file. Social History Main Topics    Smoking status: Never Smoker    Smokeless tobacco: Never Used    Alcohol use No    Drug use: No    Sexual activity: No     Other Topics Concern    Not on file     Social History Narrative         ALLERGIES: Review of patient's allergies indicates no known allergies. Review of Systems   Constitutional: Positive for activity change, appetite change and chills. Negative for fever. HENT: Negative for congestion, rhinorrhea, sinus pressure, sneezing and sore throat. Eyes: Negative for pain, discharge and visual disturbance. Respiratory: Negative for cough and shortness of breath.     Cardiovascular: Negative for chest pain.   Gastrointestinal: Negative for abdominal pain, diarrhea, nausea and vomiting. Genitourinary: Negative for dysuria, flank pain, frequency and urgency. Musculoskeletal: Negative for arthralgias, back pain, gait problem, joint swelling, myalgias and neck pain. Skin: Positive for rash. Negative for color change. Neurological: Positive for dizziness, weakness and headaches. Negative for speech difficulty, light-headedness and numbness. Psychiatric/Behavioral: Negative for agitation, behavioral problems and confusion. All other systems reviewed and are negative. Vitals:    02/25/17 1845 02/25/17 1900 02/25/17 1913 02/25/17 1916   BP: 137/58 127/57  130/60   Pulse:       Resp:       Temp:       SpO2: 95% 94% 96% 92%   Weight:       Height:                Physical Exam   Constitutional: She is oriented to person, place, and time. She appears well-developed and well-nourished. No distress. HENT:   Head: Normocephalic and atraumatic. Right Ear: External ear normal.   Left Ear: External ear normal.   Nose: Nose normal.   Mouth/Throat: Oropharynx is clear and moist. No oropharyngeal exudate. Eyes: Conjunctivae and EOM are normal. Pupils are equal, round, and reactive to light. Neck: Normal range of motion. Neck supple. Cardiovascular: Normal rate, regular rhythm, normal heart sounds and intact distal pulses. Pulmonary/Chest: Effort normal and breath sounds normal.   Abdominal: Soft. Bowel sounds are normal. There is no tenderness. There is no rebound and no guarding. Musculoskeletal: Normal range of motion. Neurological: She is alert and oriented to person, place, and time. Skin: Skin is warm and dry. Rash noted. Rash is maculopapular and urticarial. There is erythema. Rash noted primarily to A&P torso and BL UE    Psychiatric: Judgment and thought content normal. Her mood appears anxious. Her speech is rapid and/or pressured. She is agitated.  Cognition and memory are normal. Nursing note and vitals reviewed. MDM  Number of Diagnoses or Management Options  Dizziness:   Medication reaction, subsequent encounter:   Pseudodementia:   Rash:   Diagnosis management comments: DDx: DRESS, allergic reaction, vertigo, UTI, dehydration, acute on chronic mood d/o     67 yo F presents with ongoing rash, likely 2/2 Lamictal use. Reviewed with pharmacy, derm resident at [address] [place] [address], s/sx consistent with medication reaction to Lamictal. Continues to not have s/sx of end organ damage via labs and WBC/Eosonphils improving. Start on Prednisone, advised pt to keep taking Benadryl. She was reminded of importance of derm f/u and PCP f/u. Pt daughter agrees to set up f/u appt and take pt to appt. Pt understanding or reasons to return to ED. Amount and/or Complexity of Data Reviewed  Clinical lab tests: ordered and reviewed  Tests in the radiology section of CPT®: ordered and reviewed  Review and summarize past medical records: yes  Discuss the patient with other providers: yes      ED Course       Procedures     5:29 PM  No Maegan Pacheco dermatology on call; paged [facility] dermatology for consult regarding treatment plan for the rash.     6:20 PM   Spoke with Dermatology from [address] [place] [address], resident on call, Dr. Suri Pablo, states ideal treatment would be topical steroids if no end organ damage/ suspect DRESS. States usually only start on Prednisone if end organ damage with DRESS. [facility] called pager listed for derm on call to attempt to obtain appt for early next week. Pt has a derm MD she sees normally, but did not set up an appt with after her last ED visit. 7:20 PM   Spoke with Transfer center. Dermatologist at [address] [place] [address] won't provide consult or guidance over phone. Have to physically evaluate patient in order to provide guidance regarding patient care. Pt would like to be discharged home.      LABORATORY TESTS:  Recent Results (from the past 12 hour(s))   CBC WITH AUTOMATED DIFF    Collection Time: 02/25/17 3:54 PM   Result Value Ref Range    WBC 9.0 3.6 - 11.0 K/uL    RBC 3.92 3.80 - 5.20 M/uL    HGB 11.9 11.5 - 16.0 g/dL    HCT 37.8 35.0 - 47.0 %    MCV 96.4 80.0 - 99.0 FL    MCH 30.4 26.0 - 34.0 PG    MCHC 31.5 30.0 - 36.5 g/dL    RDW 15.7 (H) 11.5 - 14.5 %    PLATELET 272 266 - 652 K/uL    NEUTROPHILS 82 (H) 32 - 75 %    LYMPHOCYTES 4 (L) 12 - 49 %    MONOCYTES 6 5 - 13 %    EOSINOPHILS 8 (H) 0 - 7 %    BASOPHILS 0 0 - 1 %    ABS. NEUTROPHILS 7.4 1.8 - 8.0 K/UL    ABS. LYMPHOCYTES 0.4 (L) 0.8 - 3.5 K/UL    ABS. MONOCYTES 0.5 0.0 - 1.0 K/UL    ABS. EOSINOPHILS 0.7 (H) 0.0 - 0.4 K/UL    ABS. BASOPHILS 0.0 0.0 - 0.1 K/UL    DF SMEAR SCANNED      RBC COMMENTS ANISOCYTOSIS  1+        RBC COMMENTS POIKILOCYTOSIS  1+        RBC COMMENTS TEARDROP CELLS  PRESENT        RBC COMMENTS MACROCYTOSIS  1+       METABOLIC PANEL, COMPREHENSIVE    Collection Time: 02/25/17  3:54 PM   Result Value Ref Range    Sodium 138 136 - 145 mmol/L    Potassium 3.9 3.5 - 5.1 mmol/L    Chloride 101 97 - 108 mmol/L    CO2 26 21 - 32 mmol/L    Anion gap 11 5 - 15 mmol/L    Glucose 139 (H) 65 - 100 mg/dL    BUN 14 6 - 20 MG/DL    Creatinine 1.06 (H) 0.55 - 1.02 MG/DL    BUN/Creatinine ratio 13 12 - 20      GFR est AA >60 >60 ml/min/1.73m2    GFR est non-AA 51 (L) >60 ml/min/1.73m2    Calcium 8.4 (L) 8.5 - 10.1 MG/DL    Bilirubin, total 0.9 0.2 - 1.0 MG/DL    ALT (SGPT) 29 12 - 78 U/L    AST (SGOT) 15 15 - 37 U/L    Alk.  phosphatase 52 45 - 117 U/L    Protein, total 6.3 (L) 6.4 - 8.2 g/dL    Albumin 3.4 (L) 3.5 - 5.0 g/dL    Globulin 2.9 2.0 - 4.0 g/dL    A-G Ratio 1.2 1.1 - 2.2     C REACTIVE PROTEIN, QT    Collection Time: 02/25/17  3:54 PM   Result Value Ref Range    C-Reactive protein 14.03 (H) <0.60 mg/dL   URINALYSIS W/ REFLEX CULTURE    Collection Time: 02/25/17  3:54 PM   Result Value Ref Range    Color DARK YELLOW      Appearance CLEAR CLEAR      Specific gravity 1.025 1.003 - 1.030      pH (UA) 5.5 5.0 - 8.0      Protein 30 (A) NEG mg/dL Glucose NEGATIVE  NEG mg/dL    Ketone TRACE (A) NEG mg/dL    Blood NEGATIVE  NEG      Urobilinogen 1.0 0.2 - 1.0 EU/dL    Nitrites NEGATIVE  NEG      Leukocyte Esterase NEGATIVE  NEG      WBC 0-4 0 - 4 /hpf    RBC 0-5 0 - 5 /hpf    Epithelial cells FEW FEW /lpf    Bacteria NEGATIVE  NEG /hpf    UA:UC IF INDICATED CULTURE NOT INDICATED BY UA RESULT CNI      Hyaline cast 2-5 0 - 5 /lpf   SED RATE (ESR)    Collection Time: 02/25/17  3:54 PM   Result Value Ref Range    Sed rate, automated 21 0 - 30 mm/hr   TSH 3RD GENERATION    Collection Time: 02/25/17  3:54 PM   Result Value Ref Range    TSH 0.83 0.36 - 3.74 uIU/mL   BILIRUBIN, CONFIRM    Collection Time: 02/25/17  3:54 PM   Result Value Ref Range    Bilirubin UA, confirm NEGATIVE  NEG     DRUG SCREEN, URINE    Collection Time: 02/25/17  3:55 PM   Result Value Ref Range    AMPHETAMINE NEGATIVE  NEG      BARBITURATES NEGATIVE  NEG      BENZODIAZEPINE NEGATIVE  NEG      COCAINE NEGATIVE  NEG      METHADONE NEGATIVE  NEG      OPIATES NEGATIVE  NEG      PCP(PHENCYCLIDINE) NEGATIVE  NEG      THC (TH-CANNABINOL) NEGATIVE  NEG      Drug screen comment (NOTE)        IMAGING RESULTS:  CT HEAD WO CONT   Final Result      EXAM: CT HEAD WO CONT  Clinical history: Confusion and delirium        INDICATION: Confusion/delirium, altered LOC, unexplained     COMPARISON: None.     TECHNIQUE: Unenhanced CT of the head was performed using 5 mm images. Brain and  bone windows were generated. CT dose reduction was achieved through use of a  standardized protocol tailored for this examination and automatic exposure  control for dose modulation.      FINDINGS:  The ventricles and sulci are normal in size, shape and configuration and  midline. Minimal scattered foci of hypodensity in the cerebral white matter. .  There is no intracranial hemorrhage, extra-axial collection, mass, mass effect  or midline shift. The basilar cisterns are open. No acute infarct is  identified.  The bone windows demonstrate no abnormalities. The visualized  portions of the paranasal sinuses and mastoid air cells are clear.     IMPRESSION  IMPRESSION:      No acute intracranial process.               MEDICATIONS GIVEN:  Medications   sodium chloride 0.9 % bolus infusion 1,000 mL (0 mL IntraVENous IV Completed 2/25/17 1809)   diphenhydrAMINE (BENADRYL) injection 25 mg (25 mg IntraVENous Given 2/25/17 1610)   methylPREDNISolone (PF) (SOLU-MEDROL) injection 125 mg (125 mg IntraVENous Given 2/25/17 1611)   meclizine (ANTIVERT) tablet 25 mg (25 mg Oral Given 2/25/17 1657)       IMPRESSION:  1. Rash    2. Dizziness    3. Medication reaction, subsequent encounter    4. Pseudodementia        PLAN:  1. Discharge Medication List as of 2/25/2017  7:11 PM      START taking these medications    Details   predniSONE (STERAPRED DS) 10 mg dose pack Take by mouth as directed, Print, Disp-21 Tab, R-0      hydrocortisone (HYTONE) 2.5 % topical cream Apply  to affected area two (2) times a day. use thin layer, Print, Disp-30 g, R-0      meclizine (ANTIVERT) 12.5 mg tablet Take 1 Tab by mouth three (3) times daily as needed for up to 10 days. , Print, Disp-30 Tab, R-0         CONTINUE these medications which have NOT CHANGED    Details   hydrOXYzine HCl (ATARAX) 25 mg tablet Take 1 Tab by mouth every six (6) hours as needed for Itching., Print, Disp-20 Tab, R-0      clopidogrel (PLAVIX) 75 mg tablet Take 1 Tab by mouth daily. , Normal, Disp-90 Tab, R-1      metoprolol tartrate (LOPRESSOR) 25 mg tablet TAKE 1 TABLET TWICE DAILY, Normal, Disp-180 Tab, R-1      simvastatin (ZOCOR) 40 mg tablet TAKE 1 TABLET EVERY NIGHT, Normal, Disp-90 Tab, R-1      famotidine (PEPCID) 20 mg tablet Take 20 mg by mouth two (2) times a day., Historical Med      escitalopram oxalate (LEXAPRO) 20 mg tablet Take 20 mg by mouth daily. , Historical Med      Aspirin, Buffered 81 mg tab Take  by mouth., Historical Med      calcium-cholecalciferol, d3, (CALCIUM 600 + D) 600-125 mg-unit tab Take 1 Tab by mouth two (2) times a day., Historical Med      latanoprost (XALATAN) 0.005 % ophthalmic solution Administer 1 Drop to both eyes nightly., Historical Med           2. Follow-up Information     Follow up With Details Comments [address] MD Schedule an appointment as soon as possible for a visit Please go see on Monday or Tuesday of next week  [id number] [facility] [address] [facility]      Radha Solis MD Schedule an appointment as soon as possible for a visit Please go see on Monday or Tuesday to have your WBC and CMP re-checked  [address] [facility]  [phone]      [facility] EMERGENCY DEPT Go to As needed, If symptoms worsen [address] [facility]  [phone]        3. Return to ED if worse     Discharge Note:    The patient is ready for discharge. The patient's signs, symptoms, diagnosis, and discharge instruction have been discussed and the patient has conveyed their understanding. The patient is to follow up as recommended or return to the ER should their symptoms worsen. Plan has been discussed and the patient is in agreement.     Cleve Orr NP

## 2017-02-26 ENCOUNTER — HOSPITAL ENCOUNTER (EMERGENCY)
Age: 72
Discharge: HOME | End: 2017-02-26
Attending: EMERGENCY MEDICINE | Admitting: EMERGENCY MEDICINE
Payer: MEDICARE

## 2017-02-26 VITALS
DIASTOLIC BLOOD PRESSURE: 61 MMHG | TEMPERATURE: 98.1 F | BODY MASS INDEX: 20.99 KG/M2 | HEIGHT: 65 IN | HEART RATE: 75 BPM | RESPIRATION RATE: 16 BRPM | SYSTOLIC BLOOD PRESSURE: 140 MMHG | WEIGHT: 126 LBS | OXYGEN SATURATION: 99 %

## 2017-02-26 DIAGNOSIS — L29.9 ITCHING: ICD-10-CM

## 2017-02-26 DIAGNOSIS — L27.0 DRUG RASH: Primary | ICD-10-CM

## 2017-02-26 PROCEDURE — 99282 EMERGENCY DEPT VISIT SF MDM: CPT

## 2017-02-26 PROCEDURE — 74011250637 HC RX REV CODE- 250/637: Performed by: EMERGENCY MEDICINE

## 2017-02-26 RX ORDER — HYDROXYZINE 25 MG/1
50 TABLET, FILM COATED ORAL
Status: COMPLETED | OUTPATIENT
Start: 2017-02-26 | End: 2017-02-26

## 2017-02-26 RX ORDER — HYDROXYZINE 50 MG/1
50 TABLET, FILM COATED ORAL
Qty: 12 TAB | Refills: 0 | Status: SHIPPED | OUTPATIENT
Start: 2017-02-26 | End: 2017-04-24

## 2017-02-26 RX ADMIN — Medication 50 MG: at 12:05

## 2017-02-26 NOTE — DISCHARGE INSTRUCTIONS
Dermatitis: Care Instructions  Your Care Instructions  Dermatitis is the general name used for any rash or inflammation of the skin. Different kinds of dermatitis cause different kinds of rashes. Common causes of a rash include new medicines, plants (such as poison oak or poison ivy), heat, and stress. Certain illnesses can also cause a rash. An allergic reaction to something that touches your skin, such as latex, nickel, or poison ivy, is called contact dermatitis. Contact dermatitis may also be caused by something that irritates the skin, such as bleach, a chemical, or soap. These types of rashes cannot be spread from person to person. How long your rash will last depends on what caused it. Rashes may last a few days or months. Follow-up care is a key part of your treatment and safety. Be sure to make and go to all appointments, and call your doctor if you are having problems. It's also a good idea to know your test results and keep a list of the medicines you take. How can you care for yourself at home? · Do not scratch the rash. Cut your nails short, and file them smooth. Or wear gloves if this helps keep you from scratching. · Wash the area with water only. Pat dry. · Put cold, wet cloths on the rash to reduce itching. · Keep cool, and stay out of the sun. · Leave the rash open to the air as much as possible. · If the rash itches, use hydrocortisone cream. Follow the directions on the label. Calamine lotion may help for plant rashes. · Take an over-the-counter antihistamine, such as diphenhydramine (Benadryl) or loratadine (Claritin), to help calm the itching. Read and follow all instructions on the label. · If your doctor prescribed a cream, use it as directed. If your doctor prescribed medicine, take it exactly as directed. When should you call for help?   Call your doctor now or seek immediate medical care if:  · You have symptoms of infection, such as:  ¨ Increased pain, swelling, warmth, or redness. ¨ Red streaks leading from the area. ¨ Pus draining from the area. ¨ A fever. · You have joint pain along with the rash. Watch closely for changes in your health, and be sure to contact your doctor if:  · Your rash is changing or getting worse. · You are not getting better as expected. Where can you learn more? Go to http://tanya-mk.info/. Enter [id number] in the search box to learn more about \"Dermatitis: Care Instructions. \"  Current as of: May 10, 2016  Content Version: 11.1  © 4817-5657 [employer]. Care instructions adapted under license by [employer] (which disclaims liability or warranty for this information). If you have questions about a medical condition or this instruction, always ask your healthcare professional. [address] any warranty or liability for your use of this information. We hope that we have addressed all of your medical concerns. The examination and treatment you received in the Emergency Department were for an emergent problem and were not intended as complete care. It is important that you follow up with your healthcare provider(s) for ongoing care. If your symptoms worsen or do not improve as expected, and you are unable to reach your usual health care provider(s), you should return to the Emergency Department. Today's healthcare is undergoing tremendous change, and patient satisfaction surveys are one of the many tools to assess the quality of medical care. You may receive a survey from the [employer] organization regarding your experience in the Emergency Department. I hope that your experience has been completely positive, particularly the medical care that I provided. As such, please participate in the survey; anything less than excellent does not meet my expectations or intentions.         [facility] and [facility] participate in nationally recognized quality of care measures. If your blood pressure is greater than 120/80, as reported below, we urge that you seek medical care to address the potential of high blood pressure, commonly known as hypertension. Hypertension can be hereditary or can be caused by certain medical conditions, pain, stress, or \"white coat syndrome. \"       Please make an appointment with your health care provider(s) for follow up of your Emergency Department visit. VITALS:   Patient Vitals for the past 8 hrs:   Temp Pulse Resp BP SpO2   02/26/17 1149 98.1 °F (36.7 °C) 75 16 140/61 99 %          Thank you for allowing us to provide you with medical care today. We realize that you have many choices for your emergency care needs. Please choose us in the future for any continued health care needs. [facility], [address] [place] [address].   Office: [phone]            Recent Results (from the past 24 hour(s))   CBC WITH AUTOMATED DIFF    Collection Time: 02/25/17  3:54 PM   Result Value Ref Range    WBC 9.0 3.6 - 11.0 K/uL    RBC 3.92 3.80 - 5.20 M/uL    HGB 11.9 11.5 - 16.0 g/dL    HCT 37.8 35.0 - 47.0 %    MCV 96.4 80.0 - 99.0 FL    MCH 30.4 26.0 - 34.0 PG    MCHC 31.5 30.0 - 36.5 g/dL    RDW 15.7 (H) 11.5 - 14.5 %    PLATELET 627 253 - 579 K/uL    NEUTROPHILS 82 (H) 32 - 75 %    LYMPHOCYTES 4 (L) 12 - 49 %    MONOCYTES 6 5 - 13 %    EOSINOPHILS 8 (H) 0 - 7 %    BASOPHILS 0 0 - 1 %    ABS. NEUTROPHILS 7.4 1.8 - 8.0 K/UL    ABS. LYMPHOCYTES 0.4 (L) 0.8 - 3.5 K/UL    ABS. MONOCYTES 0.5 0.0 - 1.0 K/UL    ABS. EOSINOPHILS 0.7 (H) 0.0 - 0.4 K/UL    ABS.  BASOPHILS 0.0 0.0 - 0.1 K/UL    DF SMEAR SCANNED      RBC COMMENTS ANISOCYTOSIS  1+        RBC COMMENTS POIKILOCYTOSIS  1+        RBC COMMENTS TEARDROP CELLS  PRESENT        RBC COMMENTS MACROCYTOSIS  1+       METABOLIC PANEL, COMPREHENSIVE    Collection Time: 02/25/17  3:54 PM   Result Value Ref Range    Sodium 138 136 - 145 mmol/L    Potassium 3.9 3.5 - 5.1 mmol/L    Chloride 101 97 - 108 mmol/L    CO2 26 21 - 32 mmol/L    Anion gap 11 5 - 15 mmol/L    Glucose 139 (H) 65 - 100 mg/dL    BUN 14 6 - 20 MG/DL    Creatinine 1.06 (H) 0.55 - 1.02 MG/DL    BUN/Creatinine ratio 13 12 - 20      GFR est AA >60 >60 ml/min/1.73m2    GFR est non-AA 51 (L) >60 ml/min/1.73m2    Calcium 8.4 (L) 8.5 - 10.1 MG/DL    Bilirubin, total 0.9 0.2 - 1.0 MG/DL    ALT (SGPT) 29 12 - 78 U/L    AST (SGOT) 15 15 - 37 U/L    Alk.  phosphatase 52 45 - 117 U/L    Protein, total 6.3 (L) 6.4 - 8.2 g/dL    Albumin 3.4 (L) 3.5 - 5.0 g/dL    Globulin 2.9 2.0 - 4.0 g/dL    A-G Ratio 1.2 1.1 - 2.2     C REACTIVE PROTEIN, QT    Collection Time: 02/25/17  3:54 PM   Result Value Ref Range    C-Reactive protein 14.03 (H) <0.60 mg/dL   URINALYSIS W/ REFLEX CULTURE    Collection Time: 02/25/17  3:54 PM   Result Value Ref Range    Color DARK YELLOW      Appearance CLEAR CLEAR      Specific gravity 1.025 1.003 - 1.030      pH (UA) 5.5 5.0 - 8.0      Protein 30 (A) NEG mg/dL    Glucose NEGATIVE  NEG mg/dL    Ketone TRACE (A) NEG mg/dL    Blood NEGATIVE  NEG      Urobilinogen 1.0 0.2 - 1.0 EU/dL    Nitrites NEGATIVE  NEG      Leukocyte Esterase NEGATIVE  NEG      WBC 0-4 0 - 4 /hpf    RBC 0-5 0 - 5 /hpf    Epithelial cells FEW FEW /lpf    Bacteria NEGATIVE  NEG /hpf    UA:UC IF INDICATED CULTURE NOT INDICATED BY UA RESULT CNI      Hyaline cast 2-5 0 - 5 /lpf   SED RATE (ESR)    Collection Time: 02/25/17  3:54 PM   Result Value Ref Range    Sed rate, automated 21 0 - 30 mm/hr   TSH 3RD GENERATION    Collection Time: 02/25/17  3:54 PM   Result Value Ref Range    TSH 0.83 0.36 - 3.74 uIU/mL   BILIRUBIN, CONFIRM    Collection Time: 02/25/17  3:54 PM   Result Value Ref Range    Bilirubin UA, confirm NEGATIVE  NEG     DRUG SCREEN, URINE    Collection Time: 02/25/17  3:55 PM   Result Value Ref Range    AMPHETAMINE NEGATIVE  NEG      BARBITURATES NEGATIVE  NEG      BENZODIAZEPINE NEGATIVE  NEG COCAINE NEGATIVE  NEG      METHADONE NEGATIVE  NEG      OPIATES NEGATIVE  NEG      PCP(PHENCYCLIDINE) NEGATIVE  NEG      THC (TH-CANNABINOL) NEGATIVE  NEG      Drug screen comment (NOTE)        Ct Head Wo Cont    Result Date: 2/25/2017  EXAM:  CT HEAD WO CONT Clinical history: Confusion and delirium INDICATION:   Confusion/delirium, altered LOC, unexplained COMPARISON: None. TECHNIQUE: Unenhanced CT of the head was performed using 5 mm images. Brain and bone windows were generated. CT dose reduction was achieved through use of a standardized protocol tailored for this examination and automatic exposure control for dose modulation. FINDINGS: The ventricles and sulci are normal in size, shape and configuration and midline. Minimal scattered foci of hypodensity in the cerebral white matter. . There is no intracranial hemorrhage, extra-axial collection, mass, mass effect or midline shift. The basilar cisterns are open. No acute infarct is identified. The bone windows demonstrate no abnormalities. The visualized portions of the paranasal sinuses and mastoid air cells are clear. IMPRESSION: No acute intracranial process.

## 2017-02-26 NOTE — ED TRIAGE NOTES
Patient arrived with c/o all over body rash starting x 2 wks which has gotten progressively worse. Was seen at [facility] Friday night for the same. Took 2 benadryl at 7 am this morning. Thinks it is related to taking Lamictal which she stopped on Monday. Speaking normally without difficulty breathing.

## 2017-02-26 NOTE — ED PROVIDER NOTES
HPI Comments: See Pt's note from 2017. Patient returns with continued itching from her drug rash, no fevers, no new problems    The history is provided by the patient. Past Medical History:   Diagnosis Date    COPD (chronic obstructive pulmonary disease) ([address] 2016    Coronary atherosclerosis of native coronary artery 2006    MI/Left ventricular wall motion is segmentally abnormal with mild hypokinesis of the anterior wall. Ejection Fraction is estimated at 50%. LAD >90% stenosis. LCX 50% stenosis.  Elevated BP 2017    Family history of suicide     father    GERD (gastroesophageal reflux disease)     Glaucoma     Mixed hyperlipidemia     Osteopenia 2015    DEXA ,     S/P angioplasty with stent     stent to LAD. 3X8mm Cypher. Past Surgical History:   Procedure Laterality Date    HX  SECTION  1    HX COLONOSCOPY      reportedly normal    HX ENDOSCOPY      reportedly normal    HX HEART CATHETERIZATION      Left ventricular wall motion is segmentally abnormal with mild hypokinesis of the anterior wall. Ejection Fraction is estimated at 50%. LAD >90% stenosis. LCX 50% stenosis.  HX HEART CATHETERIZATION      2 stents placed    HX ORTHOPAEDIC  8/10/15    hip replacement    HX PTCA  2006    stent to LAD. 3X8mm Cypher.  US GUIDED CORE BREAST BIOPSY Left 2010         Family History:   Problem Relation Age of Onset    Suicide Father     Heart Disease Sister       61    Cancer Brother      bladder cancer       Social History     Social History    Marital status:      Spouse name: N/A    Number of children: N/A    Years of education: N/A     Occupational History    Not on file.      Social History Main Topics    Smoking status: Never Smoker    Smokeless tobacco: Never Used    Alcohol use No    Drug use: No    Sexual activity: No     Other Topics Concern    Not on file     Social History Narrative         ALLERGIES: Review of patient's allergies indicates no known allergies. Review of Systems   Skin: Positive for rash. itching     Constitutional: Positive for activity change, appetite change and chills. Negative for fever. HENT: Negative for congestion, rhinorrhea, sinus pressure, sneezing and sore throat. Eyes: Negative for pain, discharge and visual disturbance. Respiratory: Negative for cough and shortness of breath. Cardiovascular: Negative for chest pain. Gastrointestinal: Negative for abdominal pain, diarrhea, nausea and vomiting. Genitourinary: Negative for dysuria, flank pain, frequency and urgency. Musculoskeletal: Negative for arthralgias, back pain, gait problem, joint swelling, myalgias and neck pain. Skin: Positive for rash. Negative for color change. Neurological: Positive for dizziness, weakness and headaches. Negative for speech difficulty, light-headedness and numbness. Psychiatric/Behavioral: Negative for agitation, behavioral problems and confusion. All other systems reviewed and are negative. Vitals:    02/26/17 1149   BP: 140/61   Pulse: 75   Resp: 16   Temp: 98.1 °F (36.7 °C)   SpO2: 99%   Weight: 57.2 kg (126 lb)   Height: 5' 5\" (1.651 m)            Physical Exam   Constitutional: She is oriented to person, place, and time. She appears well-developed and well-nourished. HENT:   Head: Normocephalic and atraumatic. Cardiovascular: Normal rate, regular rhythm and intact distal pulses. Pulmonary/Chest: Effort normal. No respiratory distress. Musculoskeletal: Normal range of motion. She exhibits deformity. She exhibits no edema or tenderness. Neurological: She is alert and oriented to person, place, and time. Skin: She is not diaphoretic. Diffuse red raised rash over entire body   Nursing note and vitals reviewed.        MDM  Number of Diagnoses or Management Options  Drug rash:   Itching:   Diagnosis management comments: Drug rash (lamictal) patient here because the itching is bad again - will try atarax and she states she will see her dermatologist tomorrow.     Patient Progress  Patient progress: stable    ED Course       Procedures

## 2017-02-26 NOTE — DISCHARGE INSTRUCTIONS
We hope that we have addressed all of your medical concerns. The examination and treatment you received in the Emergency Department were for an emergent problem and were not intended as complete care. It is important that you follow up with your healthcare provider(s) for ongoing care. If your symptoms worsen or do not improve as expected, and you are unable to reach your usual health care provider(s), you should return to the Emergency Department. Today's healthcare is undergoing tremendous change, and patient satisfaction surveys are one of the many tools to assess the quality of medical care. You may receive a survey from the [employer] regarding your experience in the Emergency Department. I hope that your experience has been completely positive, particularly the medical care that I provided. As such, please participate in the survey; anything less than excellent does not meet my expectations or intentions. [facility] and [address] [place] [address] participate in nationally recognized quality of care measures. If your blood pressure is greater than 120/80, as reported below, we urge that you seek medical care to address the potential of high blood pressure, commonly known as hypertension. Hypertension can be hereditary or can be caused by certain medical conditions, pain, stress, or \"white coat syndrome. \"       Please make an appointment with your health care provider(s) for follow up of your Emergency Department visit. VITALS:   Patient Vitals for the past 8 hrs:   Temp Pulse Resp BP SpO2   02/25/17 1809 100.1 °F (37.8 °C) - - - -   02/25/17 1753 - - - - 97 %   02/25/17 1752 - - - 144/44 -   02/25/17 1501 99.5 °F (37.5 °C) (!) 13 16 142/63 97 %          Thank you for allowing us to provide you with medical care today. We realize that you have many choices for your emergency care needs.   Please choose us in the future for any continued health care needs. Valdo Colón, [facility].   Office: [phone]            Recent Results (from the past 24 hour(s))   CBC WITH AUTOMATED DIFF    Collection Time: 02/25/17  3:54 PM   Result Value Ref Range    WBC 9.0 3.6 - 11.0 K/uL    RBC 3.92 3.80 - 5.20 M/uL    HGB 11.9 11.5 - 16.0 g/dL    HCT 37.8 35.0 - 47.0 %    MCV 96.4 80.0 - 99.0 FL    MCH 30.4 26.0 - 34.0 PG    MCHC 31.5 30.0 - 36.5 g/dL    RDW 15.7 (H) 11.5 - 14.5 %    PLATELET 557 390 - 167 K/uL    NEUTROPHILS 82 (H) 32 - 75 %    LYMPHOCYTES 4 (L) 12 - 49 %    MONOCYTES 6 5 - 13 %    EOSINOPHILS 8 (H) 0 - 7 %    BASOPHILS 0 0 - 1 %    ABS. NEUTROPHILS 7.4 1.8 - 8.0 K/UL    ABS. LYMPHOCYTES 0.4 (L) 0.8 - 3.5 K/UL    ABS. MONOCYTES 0.5 0.0 - 1.0 K/UL    ABS. EOSINOPHILS 0.7 (H) 0.0 - 0.4 K/UL    ABS. BASOPHILS 0.0 0.0 - 0.1 K/UL    DF SMEAR SCANNED      RBC COMMENTS ANISOCYTOSIS  1+        RBC COMMENTS POIKILOCYTOSIS  1+        RBC COMMENTS TEARDROP CELLS  PRESENT        RBC COMMENTS MACROCYTOSIS  1+       METABOLIC PANEL, COMPREHENSIVE    Collection Time: 02/25/17  3:54 PM   Result Value Ref Range    Sodium 138 136 - 145 mmol/L    Potassium 3.9 3.5 - 5.1 mmol/L    Chloride 101 97 - 108 mmol/L    CO2 26 21 - 32 mmol/L    Anion gap 11 5 - 15 mmol/L    Glucose 139 (H) 65 - 100 mg/dL    BUN 14 6 - 20 MG/DL    Creatinine 1.06 (H) 0.55 - 1.02 MG/DL    BUN/Creatinine ratio 13 12 - 20      GFR est AA >60 >60 ml/min/1.73m2    GFR est non-AA 51 (L) >60 ml/min/1.73m2    Calcium 8.4 (L) 8.5 - 10.1 MG/DL    Bilirubin, total 0.9 0.2 - 1.0 MG/DL    ALT (SGPT) 29 12 - 78 U/L    AST (SGOT) 15 15 - 37 U/L    Alk.  phosphatase 52 45 - 117 U/L    Protein, total 6.3 (L) 6.4 - 8.2 g/dL    Albumin 3.4 (L) 3.5 - 5.0 g/dL    Globulin 2.9 2.0 - 4.0 g/dL    A-G Ratio 1.2 1.1 - 2.2     C REACTIVE PROTEIN, QT    Collection Time: 02/25/17  3:54 PM   Result Value Ref Range    C-Reactive protein 14.03 (H) <0.60 mg/dL   URINALYSIS W/ REFLEX CULTURE    Collection Time: 02/25/17  3:54 PM   Result Value Ref Range    Color DARK YELLOW      Appearance CLEAR CLEAR      Specific gravity 1.025 1.003 - 1.030      pH (UA) 5.5 5.0 - 8.0      Protein 30 (A) NEG mg/dL    Glucose NEGATIVE  NEG mg/dL    Ketone TRACE (A) NEG mg/dL    Blood NEGATIVE  NEG      Urobilinogen 1.0 0.2 - 1.0 EU/dL    Nitrites NEGATIVE  NEG      Leukocyte Esterase NEGATIVE  NEG      WBC 0-4 0 - 4 /hpf    RBC 0-5 0 - 5 /hpf    Epithelial cells FEW FEW /lpf    Bacteria NEGATIVE  NEG /hpf    UA:UC IF INDICATED CULTURE NOT INDICATED BY UA RESULT CNI      Hyaline cast 2-5 0 - 5 /lpf   SED RATE (ESR)    Collection Time: 02/25/17  3:54 PM   Result Value Ref Range    Sed rate, automated 21 0 - 30 mm/hr   TSH 3RD GENERATION    Collection Time: 02/25/17  3:54 PM   Result Value Ref Range    TSH 0.83 0.36 - 3.74 uIU/mL   BILIRUBIN, CONFIRM    Collection Time: 02/25/17  3:54 PM   Result Value Ref Range    Bilirubin UA, confirm NEGATIVE  NEG     DRUG SCREEN, URINE    Collection Time: 02/25/17  3:55 PM   Result Value Ref Range    AMPHETAMINE NEGATIVE  NEG      BARBITURATES NEGATIVE  NEG      BENZODIAZEPINE NEGATIVE  NEG      COCAINE NEGATIVE  NEG      METHADONE NEGATIVE  NEG      OPIATES NEGATIVE  NEG      PCP(PHENCYCLIDINE) NEGATIVE  NEG      THC (TH-CANNABINOL) NEGATIVE  NEG      Drug screen comment (NOTE)        Ct Head Wo Cont    Result Date: 2/25/2017  EXAM:  CT HEAD WO CONT Clinical history: Confusion and delirium INDICATION:   Confusion/delirium, altered LOC, unexplained COMPARISON: None. TECHNIQUE: Unenhanced CT of the head was performed using 5 mm images. Brain and bone windows were generated. CT dose reduction was achieved through use of a standardized protocol tailored for this examination and automatic exposure control for dose modulation. FINDINGS: The ventricles and sulci are normal in size, shape and configuration and midline.  Minimal scattered foci of hypodensity in the cerebral white matter. . There is no intracranial hemorrhage, extra-axial collection, mass, mass effect or midline shift. The basilar cisterns are open. No acute infarct is identified. The bone windows demonstrate no abnormalities. The visualized portions of the paranasal sinuses and mastoid air cells are clear. IMPRESSION: No acute intracranial process.

## 2017-02-26 NOTE — ED NOTES
Introduced self to patient. Patient seen by Dr. Isa Langley, discharge instructions given. Patient verbalized and understanding.

## 2017-02-28 LAB — LAMOTRIGINE SERPL-MCNC: NORMAL UG/ML (ref 2–20)

## 2017-03-08 ENCOUNTER — HOSPITAL ENCOUNTER (EMERGENCY)
Age: 72
Discharge: HOME | End: 2017-03-09
Attending: EMERGENCY MEDICINE | Admitting: EMERGENCY MEDICINE
Payer: MEDICARE

## 2017-03-08 ENCOUNTER — APPOINTMENT (OUTPATIENT)
Dept: GENERAL RADIOLOGY | Age: 72
End: 2017-03-08
Attending: PHYSICIAN ASSISTANT
Payer: MEDICARE

## 2017-03-08 DIAGNOSIS — J44.9 CHRONIC OBSTRUCTIVE PULMONARY DISEASE, UNSPECIFIED COPD TYPE (HCC): Primary | ICD-10-CM

## 2017-03-08 PROCEDURE — 94640 AIRWAY INHALATION TREATMENT: CPT

## 2017-03-08 PROCEDURE — 77030013140 HC MSK NEB VYRM -A

## 2017-03-08 PROCEDURE — 96360 HYDRATION IV INFUSION INIT: CPT

## 2017-03-08 PROCEDURE — 96361 HYDRATE IV INFUSION ADD-ON: CPT

## 2017-03-08 PROCEDURE — 99285 EMERGENCY DEPT VISIT HI MDM: CPT

## 2017-03-08 PROCEDURE — 71020 XR CHEST PA LAT: CPT

## 2017-03-08 PROCEDURE — 93005 ELECTROCARDIOGRAM TRACING: CPT

## 2017-03-08 RX ORDER — SODIUM CHLORIDE 9 MG/ML
1000 INJECTION, SOLUTION INTRAVENOUS ONCE
Status: COMPLETED | OUTPATIENT
Start: 2017-03-08 | End: 2017-03-09

## 2017-03-08 RX ORDER — ACETAMINOPHEN 325 MG/1
650 TABLET ORAL
Status: COMPLETED | OUTPATIENT
Start: 2017-03-08 | End: 2017-03-09

## 2017-03-09 VITALS
DIASTOLIC BLOOD PRESSURE: 44 MMHG | SYSTOLIC BLOOD PRESSURE: 116 MMHG | RESPIRATION RATE: 17 BRPM | HEART RATE: 73 BPM | OXYGEN SATURATION: 95 % | WEIGHT: 122.36 LBS | TEMPERATURE: 99 F | BODY MASS INDEX: 20.39 KG/M2 | HEIGHT: 65 IN

## 2017-03-09 LAB
ALBUMIN SERPL BCP-MCNC: 3.1 G/DL (ref 3.5–5)
ALBUMIN/GLOB SERPL: 1 {RATIO} (ref 1.1–2.2)
ALP SERPL-CCNC: 103 U/L (ref 45–117)
ALT SERPL-CCNC: 50 U/L (ref 12–78)
ANION GAP BLD CALC-SCNC: 7 MMOL/L (ref 5–15)
APPEARANCE UR: CLEAR
AST SERPL W P-5'-P-CCNC: 52 U/L (ref 15–37)
ATRIAL RATE: 96 BPM
BACTERIA URNS QL MICRO: NEGATIVE /HPF
BASOPHILS # BLD AUTO: 0 K/UL (ref 0–0.1)
BASOPHILS # BLD: 0 % (ref 0–1)
BILIRUB SERPL-MCNC: 0.8 MG/DL (ref 0.2–1)
BILIRUB UR QL: NEGATIVE
BUN SERPL-MCNC: 13 MG/DL (ref 6–20)
BUN/CREAT SERPL: 14 (ref 12–20)
CALCIUM SERPL-MCNC: 8.2 MG/DL (ref 8.5–10.1)
CALCULATED P AXIS, ECG09: 85 DEGREES
CALCULATED R AXIS, ECG10: 27 DEGREES
CALCULATED T AXIS, ECG11: 81 DEGREES
CHLORIDE SERPL-SCNC: 100 MMOL/L (ref 97–108)
CO2 SERPL-SCNC: 28 MMOL/L (ref 21–32)
COLOR UR: ABNORMAL
CREAT SERPL-MCNC: 0.96 MG/DL (ref 0.55–1.02)
DIAGNOSIS, 93000: NORMAL
DIFFERENTIAL METHOD BLD: ABNORMAL
EOSINOPHIL # BLD: 0.4 K/UL (ref 0–0.4)
EOSINOPHIL NFR BLD: 8 % (ref 0–7)
EPITH CASTS URNS QL MICRO: ABNORMAL /LPF
ERYTHROCYTE [DISTWIDTH] IN BLOOD BY AUTOMATED COUNT: 15.4 % (ref 11.5–14.5)
FLUAV AG NPH QL IA: NEGATIVE
FLUBV AG NOSE QL IA: NEGATIVE
GLOBULIN SER CALC-MCNC: 3 G/DL (ref 2–4)
GLUCOSE SERPL-MCNC: 111 MG/DL (ref 65–100)
GLUCOSE UR STRIP.AUTO-MCNC: NEGATIVE MG/DL
HCT VFR BLD AUTO: 33.5 % (ref 35–47)
HGB BLD-MCNC: 11 G/DL (ref 11.5–16)
HGB UR QL STRIP: NEGATIVE
HYALINE CASTS URNS QL MICRO: ABNORMAL /LPF (ref 0–5)
KETONES UR QL STRIP.AUTO: ABNORMAL MG/DL
LACTATE SERPL-SCNC: 1.3 MMOL/L (ref 0.4–2)
LEUKOCYTE ESTERASE UR QL STRIP.AUTO: NEGATIVE
LYMPHOCYTES # BLD AUTO: 23 % (ref 12–49)
LYMPHOCYTES # BLD: 1.1 K/UL (ref 0.8–3.5)
MCH RBC QN AUTO: 30.1 PG (ref 26–34)
MCHC RBC AUTO-ENTMCNC: 32.8 G/DL (ref 30–36.5)
MCV RBC AUTO: 91.5 FL (ref 80–99)
MONOCYTES # BLD: 0.3 K/UL (ref 0–1)
MONOCYTES NFR BLD AUTO: 7 % (ref 5–13)
MYELOCYTES NFR BLD MANUAL: 1 %
NEUTS BAND NFR BLD MANUAL: 3 % (ref 0–6)
NEUTS SEG # BLD: 2.9 K/UL (ref 1.8–8)
NEUTS SEG NFR BLD AUTO: 58 % (ref 32–75)
NITRITE UR QL STRIP.AUTO: NEGATIVE
P-R INTERVAL, ECG05: 80 MS
PH UR STRIP: 5 [PH] (ref 5–8)
PLATELET # BLD AUTO: 88 K/UL (ref 150–400)
POTASSIUM SERPL-SCNC: 4.2 MMOL/L (ref 3.5–5.1)
PROT SERPL-MCNC: 6.1 G/DL (ref 6.4–8.2)
PROT UR STRIP-MCNC: 30 MG/DL
Q-T INTERVAL, ECG07: 380 MS
QRS DURATION, ECG06: 108 MS
QTC CALCULATION (BEZET), ECG08: 477 MS
RBC # BLD AUTO: 3.66 M/UL (ref 3.8–5.2)
RBC #/AREA URNS HPF: ABNORMAL /HPF (ref 0–5)
RBC MORPH BLD: ABNORMAL
SODIUM SERPL-SCNC: 135 MMOL/L (ref 136–145)
SP GR UR REFRACTOMETRY: 1.03 (ref 1–1.03)
TROPONIN I SERPL-MCNC: <0.04 NG/ML
UROBILINOGEN UR QL STRIP.AUTO: 1 EU/DL (ref 0.2–1)
VENTRICULAR RATE, ECG03: 95 BPM
WBC # BLD AUTO: 4.7 K/UL (ref 3.6–11)
WBC URNS QL MICRO: ABNORMAL /HPF (ref 0–4)

## 2017-03-09 PROCEDURE — 84484 ASSAY OF TROPONIN QUANT: CPT | Performed by: PHYSICIAN ASSISTANT

## 2017-03-09 PROCEDURE — 83605 ASSAY OF LACTIC ACID: CPT | Performed by: PHYSICIAN ASSISTANT

## 2017-03-09 PROCEDURE — 36415 COLL VENOUS BLD VENIPUNCTURE: CPT | Performed by: PHYSICIAN ASSISTANT

## 2017-03-09 PROCEDURE — 87804 INFLUENZA ASSAY W/OPTIC: CPT | Performed by: PHYSICIAN ASSISTANT

## 2017-03-09 PROCEDURE — 74011000250 HC RX REV CODE- 250: Performed by: PHYSICIAN ASSISTANT

## 2017-03-09 PROCEDURE — 80053 COMPREHEN METABOLIC PANEL: CPT | Performed by: PHYSICIAN ASSISTANT

## 2017-03-09 PROCEDURE — 74011250636 HC RX REV CODE- 250/636: Performed by: PHYSICIAN ASSISTANT

## 2017-03-09 PROCEDURE — 85025 COMPLETE CBC W/AUTO DIFF WBC: CPT | Performed by: PHYSICIAN ASSISTANT

## 2017-03-09 PROCEDURE — 87040 BLOOD CULTURE FOR BACTERIA: CPT | Performed by: PHYSICIAN ASSISTANT

## 2017-03-09 PROCEDURE — 74011250637 HC RX REV CODE- 250/637: Performed by: PHYSICIAN ASSISTANT

## 2017-03-09 PROCEDURE — 81001 URINALYSIS AUTO W/SCOPE: CPT | Performed by: PHYSICIAN ASSISTANT

## 2017-03-09 RX ORDER — IPRATROPIUM BROMIDE AND ALBUTEROL SULFATE 2.5; .5 MG/3ML; MG/3ML
3 SOLUTION RESPIRATORY (INHALATION)
Status: COMPLETED | OUTPATIENT
Start: 2017-03-09 | End: 2017-03-09

## 2017-03-09 RX ADMIN — IPRATROPIUM BROMIDE AND ALBUTEROL SULFATE 3 ML: .5; 2.5 SOLUTION RESPIRATORY (INHALATION) at 03:07

## 2017-03-09 RX ADMIN — ACETAMINOPHEN 650 MG: 325 TABLET ORAL at 00:36

## 2017-03-09 RX ADMIN — SODIUM CHLORIDE 1000 ML/HR: 900 INJECTION, SOLUTION INTRAVENOUS at 00:36

## 2017-03-09 NOTE — ED PROVIDER NOTES
HPI Comments: 67 yo  female with medical hx remarkable for COPD, coronary artery disease, GERD, glaucoma, mixed hyperlipidemia, and osteopenia presenting ambulatory to the ED with complaint of anorexia, cough which was productive and now dry, fatigue, malaise and fever for the past 4 days. UOP also decreased. Drinking small amounts. Fever unmeasured but taking Tylenol las dose 1 gram at 1300 yesterday. Generalized myalgias, chest pain, SOB, nausea, diarrhea No ill contacts recognized. Saw PCP four days ago and started on azithromycin for \"bronchitis\". Mucinex for cough. Minimal improvement. Patient is a 66 y.o. female presenting with cough. The history is provided by the patient. Cough   Associated symptoms include chest pain, chills, ear pain, headaches, rhinorrhea, sore throat, myalgias, shortness of breath and vomiting. Past Medical History:   Diagnosis Date    COPD (chronic obstructive pulmonary disease) ([facility] [address] 2016    Coronary atherosclerosis of native coronary artery 2006    MI/Left ventricular wall motion is segmentally abnormal with mild hypokinesis of the anterior wall. Ejection Fraction is estimated at 50%. LAD >90% stenosis. LCX 50% stenosis.  Elevated BP 2017    Family history of suicide     father    GERD (gastroesophageal reflux disease)     Glaucoma     Mixed hyperlipidemia     Osteopenia     DEXA ,     S/P angioplasty with stent     stent to LAD. 3X8mm Cypher. Past Surgical History:   Procedure Laterality Date    HX  SECTION  1    HX COLONOSCOPY  2013    reportedly normal    HX ENDOSCOPY      reportedly normal    HX HEART CATHETERIZATION      Left ventricular wall motion is segmentally abnormal with mild hypokinesis of the anterior wall. Ejection Fraction is estimated at 50%. LAD >90% stenosis. LCX 50% stenosis.      HX HEART CATHETERIZATION      2 stents placed    HX ORTHOPAEDIC  8/10/15    hip replacement    HX PTCA  2006    stent to LAD. 3X8mm Cypher.  US GUIDED CORE BREAST BIOPSY Left 2010         Family History:   Problem Relation Age of Onset    Suicide Father     Heart Disease Sister       61    Cancer Brother      bladder cancer       Social History     Social History    Marital status:      Spouse name: N/A    Number of children: N/A    Years of education: N/A     Occupational History    Not on file. Social History Main Topics    Smoking status: Never Smoker    Smokeless tobacco: Never Used    Alcohol use No    Drug use: No    Sexual activity: No     Other Topics Concern    Not on file     Social History Narrative         ALLERGIES: Lamictal [lamotrigine]    Review of Systems   Constitutional: Positive for activity change, appetite change, chills, fatigue and fever. HENT: Positive for congestion, ear pain, rhinorrhea and sore throat. Respiratory: Positive for cough and shortness of breath. Cardiovascular: Positive for chest pain. Gastrointestinal: Positive for abdominal pain, diarrhea and vomiting. Genitourinary: Positive for decreased urine volume and difficulty urinating. Musculoskeletal: Positive for arthralgias and myalgias. Skin: Positive for color change and rash. Neurological: Positive for dizziness and headaches. All other systems reviewed and are negative. Vitals:    17 2337   BP: 160/69   Pulse: 99   Resp: 16   Temp: (!) 101.5 °F (38.6 °C)   SpO2: 98%   Weight: 55.5 kg (122 lb 5.7 oz)   Height: 5' 5\" (1.651 m)            Physical Exam   Constitutional: She is oriented to person, place, and time. She appears well-developed and well-nourished. No distress.  elderly female ill appearing in NAD   HENT:   Head: Normocephalic and atraumatic.    Right Ear: Tympanic membrane, external ear and ear canal normal.   Left Ear: Tympanic membrane, external ear and ear canal normal.   Nose: Nose normal.   Mouth/Throat: Uvula is midline, oropharynx is clear and moist and mucous membranes are normal. No oropharyngeal exudate. Eyes: Conjunctivae and EOM are normal. Pupils are equal, round, and reactive to light. Right eye exhibits no discharge. Left eye exhibits no discharge. No scleral icterus. Neck: Normal range of motion. Neck supple. Cardiovascular: Normal rate and regular rhythm. Exam reveals no gallop and no friction rub. No murmur heard. Pulmonary/Chest: Effort normal. She has no wheezes. She has rales (right lung base). Abdominal: Soft. Bowel sounds are normal. She exhibits no distension. There is no tenderness. There is no rebound and no guarding. Neurological: She is alert and oriented to person, place, and time. Coordination normal.   Skin: Skin is warm and dry. She is not diaphoretic. Psychiatric: She has a normal mood and affect. Her behavior is normal.   Nursing note and vitals reviewed. MDM  Number of Diagnoses or Management Options  Diagnosis management comments: 65 yo  female with complaint of fatigue, malaise, fever, cough, anorexia and myalgias x 4 days. Febrile on presentation. Concern for acute respiratory infection PNA vs Flu with associated COPD exacerbation. Pt not hypoxic or tachypneic on exam.  Will check labs, EKG, trop, xray chest, lactic, blood cultures, IVF and reassess. Donna Rees, [address]         Amount and/or Complexity of Data Reviewed  Clinical lab tests: ordered and reviewed  Tests in the radiology section of CPT®: ordered and reviewed  Independent visualization of images, tracings, or specimens: yes      ED Course       Procedures      Progress note       Labs and imaging reviewed. No leukocytosis. Electrolytes stable. Chest xray clear. Vitals improved. Lactic negative. Donna Rees, [address]    Pt care transferred to Dr. Thelma Taylor.  Donna Chavez, [address]

## 2017-03-09 NOTE — DISCHARGE INSTRUCTIONS
COPD and Asthma: Care Instructions  Your Care Instructions  Some people who have chronic obstructive pulmonary disease (COPD) also have asthma. Both of these problems can damage your lungs. This makes it very important to control them. Asthma causes the airways that lead to the lungs to swell and become narrow. This makes it hard to breathe. You may wheeze or cough. If you have a bad attack, you may need emergency care. There are two parts to treating asthma. · Controlling asthma over the long term. · Treating attacks when they occur. You and your doctor can make an asthma treatment plan that will help. This plan tells you the medicines you take every day to reduce the swelling in your airways and prevent attacks. It also tells you what to do if you have an asthma attack. Follow-up care is a key part of your treatment and safety. Be sure to make and go to all appointments, and call your doctor if you are having problems. It's also a good idea to know your test results and keep a list of the medicines you take. How can you care for yourself at home? To control asthma over the long term  Medicines  Controller medicines reduce swelling in your lungs. They also prevent asthma attacks. Take your controller medicine exactly as prescribed. Talk to your doctor if you have any problems with your medicine. · Inhaled corticosteroid is a common and effective controller medicine. Using it the right way can prevent or reduce most side effects. · Take your controller medicine every day, not just when you have symptoms. This helps prevent problems before they occur. · Always bring your asthma medicine with you when you travel. · Your doctor may prescribe long-acting medicine that combines a corticosteroid with a beta2-agonist. Follow your doctor's instructions exactly about how to take a long-acting medicine. Examples include:  ¨ Fluticasone and salmeterol (Advair). ¨ Budesonide and formoterol (Symbicort).   · Do not depend on your controller medicines to stop an asthma attack that has already started. They do not work fast enough to help. · Your doctor may also prescribe anticholinergic inhalers. These include ipratropium (Atrovent) and tiotropium (Spiriva). Education  · Learn what sets off an asthma attack. Avoid these triggers when you can. Common triggers include smoke, air pollution, pollen, animal dander, colds, stress, and cold air. · Do not smoke. Smoking can make COPD and asthma worse. If you need help quitting, talk to your doctor about stop-smoking programs and medicines. These can increase your chances of quitting for good. · Check yourself for symptoms to know which step to follow in your action plan. Watch for things like being short of breath, having chest tightness, coughing, and wheezing. Also notice if symptoms wake you up at night or if you get tired quickly when you exercise. · You may want to learn how to use a peak flow meter. This measures how open your airways are. It may help you know when you will have an asthma attack. To treat attacks when they occur  Use your asthma action plan when you have an attack. Your quick-relief medicine, such as albuterol, will stop an asthma attack. It relaxes the muscles that get tight around the airways. · Take your quick-relief medicine exactly as prescribed. Talk with your doctor if you have any problems with your medicine. · Keep this medicine with you at all times. · You may need to use this medicine before you exercise. If your doctor prescribed corticosteroid pills to use during an attack, take them as directed. They may take hours to work, but they may shorten the attack and help you breathe better. When should you call for help? Call 911 anytime you think you may need emergency care. For example, call if:  · You have severe trouble breathing.   Call your doctor now or seek immediate medical care if:  · You have new or worse shortness of breath. · You are coughing more deeply or more often, especially if you notice more mucus or a change in the color of your mucus. · You cough up blood. · You have new or increased swelling in your legs or belly. · You have a fever. · You have used your quick-relief medicine but you are still short of breath. Watch closely for changes in your health, and be sure to contact your doctor if you have any problems. Where can you learn more? Go to http://tanya-mk.info/. Enter A350 in the search box to learn more about \"COPD and Asthma: Care Instructions. \"  Current as of: May 23, 2016  Content Version: 11.1  © 8576-0658 [employer]. Care instructions adapted under license by [employer] (which disclaims liability or warranty for this information). If you have questions about a medical condition or this instruction, always ask your healthcare professional. [place] [address] any warranty or liability for your use of this information. We hope that we have addressed all of your medical concerns. The examination and treatment you received in the Emergency Department were for an emergent problem and were not intended as complete care. It is important that you follow up with your healthcare provider(s) for ongoing care. If your symptoms worsen or do not improve as expected, and you are unable to reach your usual health care provider(s), you should return to the Emergency Department. Today's healthcare is undergoing tremendous change, and patient satisfaction surveys are one of the many tools to assess the quality of medical care. You may receive a survey from the [employer] organization regarding your experience in the Emergency Department. I hope that your experience has been completely positive, particularly the medical care that I provided.   As such, please participate in the survey; anything less than excellent does not meet my expectations or intentions. [facility] and [facility] participate in nationally recognized quality of care measures. If your blood pressure is greater than 120/80, as reported below, we urge that you seek medical care to address the potential of high blood pressure, commonly known as hypertension. Hypertension can be hereditary or can be caused by certain medical conditions, pain, stress, or \"white coat syndrome. \"       Please make an appointment with your health care provider(s) for follow up of your Emergency Department visit. VITALS:   Patient Vitals for the past 8 hrs:   Temp Pulse Resp BP SpO2   03/09/17 0345 - 73 17 116/44 95 %   03/09/17 0330 - 82 17 117/43 94 %   03/09/17 0315 - 74 13 99/49 99 %   03/09/17 0300 - 80 19 120/42 94 %   03/09/17 0255 99 °F (37.2 °C) - - - -   03/09/17 0245 - 81 22 120/41 93 %   03/09/17 0230 - 70 23 105/49 95 %   03/09/17 0215 - 73 17 109/47 98 %   03/09/17 0200 - 80 18 118/46 94 %   03/09/17 0147 99.5 °F (37.5 °C) - - - -   03/09/17 0145 - 84 20 125/47 96 %   03/09/17 0130 - 86 21 121/47 94 %   03/09/17 0115 - 89 17 120/53 94 %   03/09/17 0100 - 92 19 125/48 94 %   03/09/17 0045 - 91 16 138/46 97 %   03/09/17 0030 - 97 18 132/53 94 %   03/09/17 0026 - - - 132/48 -   03/08/17 2337 (!) 101.5 °F (38.6 °C) 99 16 160/69 98 %          Thank you for allowing us to provide you with medical care today. We realize that you have many choices for your emergency care needs. Please choose us in the future for any continued health care needs. Obie Sun, [address] [place], [place] [address]y [address].   Office: [phone]            Recent Results (from the past 24 hour(s))   CBC WITH AUTOMATED DIFF    Collection Time: 03/09/17 12:13 AM   Result Value Ref Range    WBC 4.7 3.6 - 11.0 K/uL    RBC 3.66 (L) 3.80 - 5.20 M/uL    HGB 11.0 (L) 11.5 - 16.0 g/dL    HCT 33.5 (L) 35.0 - 47.0 %    MCV 91.5 80.0 - 99.0 FL MCH 30.1 26.0 - 34.0 PG    MCHC 32.8 30.0 - 36.5 g/dL    RDW 15.4 (H) 11.5 - 14.5 %    PLATELET 88 (L) 543 - 400 K/uL    NEUTROPHILS 58 32 - 75 %    BAND NEUTROPHILS 3 0 - 6 %    LYMPHOCYTES 23 12 - 49 %    MONOCYTES 7 5 - 13 %    EOSINOPHILS 8 (H) 0 - 7 %    BASOPHILS 0 0 - 1 %    MYELOCYTES 1 (H) 0 %    ABS. NEUTROPHILS 2.9 1.8 - 8.0 K/UL    ABS. LYMPHOCYTES 1.1 0.8 - 3.5 K/UL    ABS. MONOCYTES 0.3 0.0 - 1.0 K/UL    ABS. EOSINOPHILS 0.4 0.0 - 0.4 K/UL    ABS. BASOPHILS 0.0 0.0 - 0.1 K/UL    DF MANUAL      RBC COMMENTS NORMOCYTIC, NORMOCHROMIC     METABOLIC PANEL, COMPREHENSIVE    Collection Time: 03/09/17 12:13 AM   Result Value Ref Range    Sodium 135 (L) 136 - 145 mmol/L    Potassium 4.2 3.5 - 5.1 mmol/L    Chloride 100 97 - 108 mmol/L    CO2 28 21 - 32 mmol/L    Anion gap 7 5 - 15 mmol/L    Glucose 111 (H) 65 - 100 mg/dL    BUN 13 6 - 20 MG/DL    Creatinine 0.96 0.55 - 1.02 MG/DL    BUN/Creatinine ratio 14 12 - 20      GFR est AA >60 >60 ml/min/1.73m2    GFR est non-AA 57 (L) >60 ml/min/1.73m2    Calcium 8.2 (L) 8.5 - 10.1 MG/DL    Bilirubin, total 0.8 0.2 - 1.0 MG/DL    ALT (SGPT) 50 12 - 78 U/L    AST (SGOT) 52 (H) 15 - 37 U/L    Alk.  phosphatase 103 45 - 117 U/L    Protein, total 6.1 (L) 6.4 - 8.2 g/dL    Albumin 3.1 (L) 3.5 - 5.0 g/dL    Globulin 3.0 2.0 - 4.0 g/dL    A-G Ratio 1.0 (L) 1.1 - 2.2     LACTIC ACID, PLASMA    Collection Time: 03/09/17 12:13 AM   Result Value Ref Range    Lactic acid 1.3 0.4 - 2.0 MMOL/L   TROPONIN I    Collection Time: 03/09/17 12:13 AM   Result Value Ref Range    Troponin-I, Qt. <0.04 <0.05 ng/mL   INFLUENZA A & B AG (RAPID TEST)    Collection Time: 03/09/17 12:39 AM   Result Value Ref Range    Influenza A Antigen NEGATIVE  NEG      Influenza B Antigen NEGATIVE  NEG     URINALYSIS W/ RFLX MICROSCOPIC    Collection Time: 03/09/17  1:46 AM   Result Value Ref Range    Color DARK YELLOW      Appearance CLEAR CLEAR      Specific gravity 1.026 1.003 - 1.030      pH (UA) 5.0 5.0 - 8.0      Protein 30 (A) NEG mg/dL    Glucose NEGATIVE  NEG mg/dL    Ketone TRACE (A) NEG mg/dL    Bilirubin NEGATIVE  NEG      Blood NEGATIVE  NEG      Urobilinogen 1.0 0.2 - 1.0 EU/dL    Nitrites NEGATIVE  NEG      Leukocyte Esterase NEGATIVE  NEG      WBC 0-4 0 - 4 /hpf    RBC 0-5 0 - 5 /hpf    Epithelial cells FEW FEW /lpf    Bacteria NEGATIVE  NEG /hpf    Hyaline cast 2-5 0 - 5 /lpf       Xr Chest Pa Lat    Result Date: 3/9/2017  EXAM:  CR chest PA lateral INDICATION:  Cough for one week. COMPARISON: 8/8/2015. TECHNIQUE: AP upright and lateral chest views FINDINGS: Cardiac monitoring wires overlie the thorax. The cardia mediastinal contours are stable. The lungs and pleural spaces are clear. The bones and upper abdomen are stable. IMPRESSION: No acute process.  Stable exam.

## 2017-03-15 LAB
BACTERIA SPEC CULT: NORMAL
BACTERIA SPEC CULT: NORMAL
SERVICE CMNT-IMP: NORMAL
SERVICE CMNT-IMP: NORMAL

## 2017-03-23 DIAGNOSIS — E78.2 MIXED HYPERLIPIDEMIA: ICD-10-CM

## 2017-03-27 RX ORDER — METOPROLOL TARTRATE 25 MG/1
TABLET, FILM COATED ORAL
Qty: 180 TAB | Refills: 1 | Status: SHIPPED | OUTPATIENT
Start: 2017-03-27 | End: 2017-06-13 | Stop reason: SDUPTHER

## 2017-03-27 RX ORDER — SIMVASTATIN 40 MG/1
TABLET, FILM COATED ORAL
Qty: 90 TAB | Refills: 1 | Status: SHIPPED | OUTPATIENT
Start: 2017-03-27 | End: 2017-06-13 | Stop reason: SDUPTHER

## 2017-03-27 RX ORDER — CLOPIDOGREL BISULFATE 75 MG/1
TABLET ORAL
Qty: 90 TAB | Refills: 1 | Status: SHIPPED | OUTPATIENT
Start: 2017-03-27 | End: 2017-06-13 | Stop reason: SDUPTHER

## 2017-04-24 ENCOUNTER — OFFICE VISIT (OUTPATIENT)
Dept: FAMILY MEDICINE CLINIC | Age: 72
End: 2017-04-24

## 2017-04-24 VITALS
OXYGEN SATURATION: 99 % | DIASTOLIC BLOOD PRESSURE: 62 MMHG | WEIGHT: 125 LBS | SYSTOLIC BLOOD PRESSURE: 137 MMHG | HEIGHT: 65 IN | HEART RATE: 62 BPM | BODY MASS INDEX: 20.83 KG/M2 | RESPIRATION RATE: 18 BRPM | TEMPERATURE: 98.3 F

## 2017-04-24 DIAGNOSIS — R21 RASH: Primary | ICD-10-CM

## 2017-04-24 RX ORDER — METHYLPREDNISOLONE 4 MG/1
TABLET ORAL
Qty: 1 DOSE PACK | Refills: 0 | Status: SHIPPED | OUTPATIENT
Start: 2017-04-24 | End: 2017-05-09 | Stop reason: ALTCHOICE

## 2017-04-24 RX ORDER — METHYLPREDNISOLONE 4 MG/1
TABLET ORAL
Qty: 1 DOSE PACK | Refills: 0 | Status: SHIPPED | OUTPATIENT
Start: 2017-04-24 | End: 2017-04-24 | Stop reason: SDUPTHER

## 2017-04-24 NOTE — PROGRESS NOTES
Progress Note    Patient: Svitlana Bautista MRN: [id number]  SSN: [id number]    YOB: 1945  Age: 66 y.o. Sex: female        Chief Complaint   Patient presents with    Rash     red rash and itch         Subjective:     Encounter Diagnoses   Name Primary?  Rash Yes       Rash  Patient as complaint of rash since February 20th. Report that rash is a result of drug reaction to lamictal and stopped taking that day (Feb 20th). Prior treatments include benadryl, atarax, topical/PO steroids. Was also seen by Dermatologist (Dr. Violette Van) who recommended 5 days of cyclosporine (? Patient unsure of drug. ). Most of rash is now gone on torso. Only pruritic on arm, and torso and several verrucous warts on back. Current and past medical information:    Current Medications after this visit[de-identified]     Current Outpatient Prescriptions   Medication Sig    methylPREDNISolone (MEDROL DOSEPACK) 4 mg tablet Per dosing instruction on pack.  metoprolol tartrate (LOPRESSOR) 25 mg tablet TAKE 1 TABLET TWICE DAILY    clopidogrel (PLAVIX) 75 mg tab TAKE 1 TABLET EVERY DAY    simvastatin (ZOCOR) 40 mg tablet TAKE 1 TABLET EVERY NIGHT    famotidine (PEPCID) 20 mg tablet Take 20 mg by mouth two (2) times a day.  escitalopram oxalate (LEXAPRO) 20 mg tablet Take 20 mg by mouth daily.  Aspirin, Buffered 81 mg tab Take  by mouth.  calcium-cholecalciferol, d3, (CALCIUM 600 + D) 600-125 mg-unit tab Take 1 Tab by mouth two (2) times a day.  latanoprost (XALATAN) 0.005 % ophthalmic solution Administer 1 Drop to both eyes nightly. No current facility-administered medications for this visit.         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 Ejection Fraction is estimated at 50%. LAD >90% stenosis. LCX 50% stenosis.  Elevated BP 2017    Family history of suicide     father    GERD (gastroesophageal reflux disease)     Glaucoma     Mixed hyperlipidemia     Osteopenia 2015    DEXA ,     S/P angioplasty with stent     stent to LAD. 3X8mm Cypher. Allergies   Allergen Reactions    Lamictal [Lamotrigine] Anaphylaxis       Past Surgical History:   Procedure Laterality Date    HX  SECTION      HX COLONOSCOPY      reportedly normal    HX ENDOSCOPY      reportedly normal    HX HEART CATHETERIZATION      Left ventricular wall motion is segmentally abnormal with mild hypokinesis of the anterior wall. Ejection Fraction is estimated at 50%. LAD >90% stenosis. LCX 50% stenosis.  HX HEART CATHETERIZATION      2 stents placed    HX ORTHOPAEDIC  8/10/15    hip replacement    HX PTCA  2006    stent to LAD. 3X8mm Cypher. [facility] BREAST BIOPSY Left 2010       Social History     Social History    Marital status:      Spouse name: N/A    Number of children: N/A    Years of education: N/A     Social History Main Topics    Smoking status: Never Smoker    Smokeless tobacco: Never Used    Alcohol use No    Drug use: No    Sexual activity: No     Other Topics Concern    Not on file     Social History Narrative       {Review of Systems   Constitutional: Negative for chills and fever.    Respiratory: Negative for cough, shortness of breath and wheezing. Cardiovascular: Negative for chest pain and palpitations. Skin: Positive for itching and rash. Neurological: Negative for dizziness and tingling. Objective:     Vitals:    04/24/17 1011   BP: 137/62   Pulse: 62   Resp: 18   Temp: 98.3 °F (36.8 °C)   TempSrc: Oral   SpO2: 99%   Weight: 125 lb (56.7 kg)   Height: 5' 5\" (1.651 m)      Body mass index is 20.8 kg/(m^2). Physical Exam   Constitutional: She is oriented to person, place, and time. She appears well-developed and well-nourished. Cardiovascular: Normal rate and regular rhythm. Musculoskeletal: Normal range of motion. She exhibits no edema. Neurological: She is alert and oriented to person, place, and time. Skin: Rash noted. Health Maintenance Due   Topic Date Due    DTaP/Tdap/Td series (1 - Tdap) 10/31/1964    FOBT Q 1 YEAR AGE 50-75  10/31/1993    ZOSTER VACCINE AGE 60>  10/31/2003    GLAUCOMA SCREENING Q2Y  10/31/2008    Pneumococcal 65+ High/Highest Risk (1 of 2 - PCV13) 10/31/2008    INFLUENZA AGE 9 TO ADULT  08/01/2016    MEDICARE YEARLY EXAM  12/18/2016       Assessment and orders:     Encounter Diagnoses     ICD-10-CM ICD-9-CM   1. Rash R21 782.1       1. Rash  Advised patient to try PO steroids and return to dermatology  - methylPREDNISolone (MEDROL DOSEPACK) 4 mg tablet; Per dosing instruction on pack. Dispense: 1 Dose Pack; Refill: 0  - REFERRAL TO DERMATOLOGY        Plan of care:  Discussed diagnoses in detail with patient. Medication risks/benefits/side effects discussed with patient. All of the patient's questions were addressed. The patient understands and agrees with our plan of care. The patient knows to call back if they are unsure of or forget any changes we discussed today or if the symptoms change.      The patient received an After-Visit Summary which contains VS, orders, medication list and allergy list. This can be used as a \"mini-medical record\" should they have to seek medical care while out of town. Follow-up Disposition:  Return if symptoms worsen or fail to improve.     Future Appointments  Date Time Provider Calista Reesi   8/16/2017 9:20 AM Laura Clark MD [address] [place] [address]       Signed By: Qian Valles MD     April 24, 2017

## 2017-04-24 NOTE — PATIENT INSTRUCTIONS

## 2017-04-24 NOTE — PROGRESS NOTES
Chief Complaint   Patient presents with    Rash     red rash and itch     1. Have you been to the ER, urgent care clinic since your last visit? Hospitalized since your last visit? No    2. Have you seen or consulted any other health care providers outside of the [address] [place] [address] since your last visit? Include any pap smears or colon screening. No     Reviewed record in preparation for visit and have obtained necessary documentation.

## 2017-04-24 NOTE — MR AVS SNAPSHOT
Visit Information Date & Time Provider Department Dept. Phone Encounter #  
 4/24/2017 10:00 AM Adri Latif, 1515 [facility] [phone] [id number] Follow-up Instructions Return if symptoms worsen or fail to improve. Your Appointments 8/16/2017  9:20 AM  
ESTABLISHED PATIENT with Mildred Paul MD  
[facility] ([facility] SCHEDULING) Appt Note: 6 mo fu  
 [address] [facility] [address] 1007 [facility]  
54 [facility] [address] [address] Upcoming Health Maintenance Date Due DTaP/Tdap/Td series (1 - Tdap) 10/31/1964 FOBT Q 1 YEAR AGE 50-75 10/31/1993 ZOSTER VACCINE AGE 60> 10/31/2003 GLAUCOMA SCREENING Q2Y 10/31/2008 Pneumococcal 65+ High/Highest Risk (1 of 2 - PCV13) 10/31/2008 INFLUENZA AGE 9 TO ADULT 8/1/2016 MEDICARE YEARLY EXAM 12/18/2016 BREAST CANCER SCRN MAMMOGRAM 8/24/2018 Allergies as of 4/24/2017  Review Complete On: 4/24/2017 By: Adri Latif MD  
  
 Severity Noted Reaction Type Reactions Lamictal [Lamotrigine] High 03/08/2017    Anaphylaxis Current Immunizations  Never Reviewed Name Date Influenza Vaccine 10/14/2015 Not reviewed this visit You Were Diagnosed With   
  
 Codes Comments Rash    -  Primary ICD-10-CM: R21 
ICD-9-CM: 782.1 Vitals BP Pulse Temp Resp Height(growth percentile) Weight(growth percentile)  
 137/62 (BP 1 Location: Left arm, BP Patient Position: Sitting) 62 98.3 °F (36.8 °C) (Oral) 18 5' 5\" (1.651 m) 125 lb (56.7 kg) SpO2 BMI OB Status Smoking Status 99% 20.8 kg/m2 Postmenopausal Never Smoker Vitals History BMI and BSA Data Body Mass Index Body Surface Area  
 20.8 kg/m 2 1.61 m 2 Preferred Pharmacy Pharmacy Name Phone [address] 1583 [facility] [address] [phone] Your Updated Medication List  
  
   
This list is accurate as of: 4/24/17 10:35 AM.  Always use your most recent med list.  
  
  
  
  
 aspirin, buffered 81 mg Tab Take  by mouth. CALCIUM 600 + D 600-125 mg-unit Tab Generic drug:  calcium-cholecalciferol (d3) Take 1 Tab by mouth two (2) times a day. clopidogrel 75 mg Tab Commonly known as:  PLAVIX TAKE 1 TABLET EVERY DAY  
  
 famotidine 20 mg tablet Commonly known as:  PEPCID Take 20 mg by mouth two (2) times a day. latanoprost 0.005 % ophthalmic solution Commonly known as:  Jadine Bjornstad Administer 1 Drop to both eyes nightly. LEXAPRO 20 mg tablet Generic drug:  escitalopram oxalate Take 20 mg by mouth daily. methylPREDNISolone 4 mg tablet Commonly known as:  Linnette Holiday Per dosing instruction on pack. metoprolol tartrate 25 mg tablet Commonly known as:  LOPRESSOR  
TAKE 1 TABLET TWICE DAILY  
  
 simvastatin 40 mg tablet Commonly known as:  ZOCOR  
TAKE 1 TABLET EVERY NIGHT Prescriptions Sent to Pharmacy Refills  
 methylPREDNISolone (MEDROL DOSEPACK) 4 mg tablet 0 Sig: Per dosing instruction on pack. Class: Normal  
 Pharmacy: [address] [place] [address], [address] [place] [address] #: [phone] We Performed the Following REFERRAL TO DERMATOLOGY [REF19 Custom] Comments:  
 Please evaluate patient for rash Follow-up Instructions Return if symptoms worsen or fail to improve. Referral Information Referral ID Referred By Referred To  
  
 [id number] Subhash, 5 rAanza Villegas MD   
   [address] Phone: [phone] Fax: [phone] Visits Status Start Date End Date 1 New Request 4/24/17 4/24/18 If your referral has a status of pending review or denied, additional information will be sent to support the outcome of this decision. Patient Instructions Rash: Care Instructions Your Care Instructions A rash is any irritation or inflammation of the skin. Rashes have many possible causes, including allergy, infection, illness, heat, and emotional stress. Follow-up care is a key part of your treatment and safety. Be sure to make and go to all appointments, and call your doctor if you are having problems. Its also a good idea to know your test results and keep a list of the medicines you take. How can you care for yourself at home? · Wash the area with water only. Soap can make dryness and itching worse. Pat dry. · Put cold, wet cloths on the rash to reduce itching. · Keep cool, and stay out of the sun. · Leave the rash open to the air as much of the time as possible. · Sometimes petroleum jelly (Vaseline) can help relieve the discomfort caused by a rash. A moisturizing lotion, such as Cetaphil, also may help. Calamine lotion may help for rashes caused by contact with something (such as a plant or soap) that irritated the skin. Use it 3 or 4 times a day. · If your doctor prescribed a cream, use it as directed. If your doctor prescribed medicine, take it exactly as directed. · If your rash itches so badly that it interferes with your normal activities, take an over-the-counter antihistamine, such as diphenhydramine (Benadryl) or loratadine (Claritin). Read and follow all instructions on the label. When should you call for help? Call your doctor now or seek immediate medical care if: 
· You have signs of infection, such as: 
¨ Increased pain, swelling, warmth, or redness. ¨ Red streaks leading from the area. ¨ Pus draining from the area. ¨ A fever. · You have joint pain along with the rash. Watch closely for changes in your health, and be sure to contact your doctor if: 
· Your rash is changing or getting worse. For example, call if you have pain along with the rash, the rash is spreading, or you have new blisters. · You do not get better after 1 week. Where can you learn more? Go to http://tanya-mk.info/. Enter K955 in the search box to learn more about \"Rash: Care Instructions. \" Current as of: October 13, 2016 Content Version: 11.2 © 8487-7159 [employer]. Care instructions adapted under license by [employer] (which disclaims liability or warranty for this information). If you have questions about a medical condition or this instruction, always ask your healthcare professional. [address] any warranty or liability for your use of this information. Introducing [facility] & HEALTH SERVICES! Sabine Alvarez introduces [employer] patient portal. Now you can access parts of your medical record, email your doctor's office, and request medication refills online. 1. In your internet browser, go to [email or web address] 2. Click on the First Time User? Click Here link in the Sign In box. You will see the New Member Sign Up page. 3. Enter your [employer] Access Code exactly as it appears below. You will not need to use this code after youve completed the sign-up process. If you do not sign up before the expiration date, you must request a new code. · [employer] Access Code: [email or web address] Expires: 7/22/2017  3:59 PM 
 
4. Enter the last four digits of your Social Security Number (xxxx) and Date of Birth (mm/dd/yyyy) as indicated and click Submit. You will be taken to the next sign-up page. 5. Create a JumpSoftt ID. This will be your [employer] login ID and cannot be changed, so think of one that is secure and easy to remember. 6. Create a [employer] password. You can change your password at any time. 7. Enter your Password Reset Question and Answer. This can be used at a later time if you forget your password. 8. Enter your e-mail address. You will receive e-mail notification when new information is available in [address] 9. Click Sign Up. You can now view and download portions of your medical record. 10. Click the Download Summary menu link to download a portable copy of your medical information. If you have questions, please visit the Frequently Asked Questions section of the [employer] website. Remember, [employer] is NOT to be used for urgent needs. For medical emergencies, dial 911. Now available from your iPhone and Android! Please provide this summary of care documentation to your next provider. Your primary care clinician is listed as Bharati Valente . If you have any questions after today's visit, please call [phone].

## 2017-05-09 ENCOUNTER — OFFICE VISIT (OUTPATIENT)
Dept: FAMILY MEDICINE CLINIC | Age: 72
End: 2017-05-09

## 2017-05-09 VITALS
RESPIRATION RATE: 18 BRPM | BODY MASS INDEX: 20.66 KG/M2 | OXYGEN SATURATION: 99 % | SYSTOLIC BLOOD PRESSURE: 120 MMHG | HEIGHT: 65 IN | TEMPERATURE: 98.1 F | HEART RATE: 64 BPM | WEIGHT: 124 LBS | DIASTOLIC BLOOD PRESSURE: 65 MMHG

## 2017-05-09 DIAGNOSIS — R73.9 HYPERGLYCEMIA: ICD-10-CM

## 2017-05-09 DIAGNOSIS — R74.01 ELEVATED TRANSAMINASE LEVEL: ICD-10-CM

## 2017-05-09 DIAGNOSIS — L30.4 PRURITIC INTERTRIGO: ICD-10-CM

## 2017-05-09 DIAGNOSIS — R21 DIFFUSE PAPULAR RASH: Primary | ICD-10-CM

## 2017-05-09 RX ORDER — TERBINAFINE HYDROCHLORIDE 250 MG/1
250 TABLET ORAL DAILY
Qty: 30 TAB | Refills: 1 | Status: SHIPPED | OUTPATIENT
Start: 2017-05-09 | End: 2017-08-30

## 2017-05-09 NOTE — MR AVS SNAPSHOT
Visit Information Date & Time Provider Department Dept. Phone Encounter #  
 5/9/2017  2:45 PM Lacie Sanderson, 2015 [facility] [phone] [id number] Follow-up Instructions Return in about 2 weeks (around 5/23/2017). Your Appointments 8/16/2017  9:20 AM  
ESTABLISHED PATIENT with Bailey Agrawal MD  
[facility] ([facility] SCHEDULING) Appt Note: 6 mo fu  
 320 [facility] 600 [address] [address] [address] Upcoming Health Maintenance Date Due DTaP/Tdap/Td series (1 - Tdap) 10/31/1964 FOBT Q 1 YEAR AGE 50-75 10/31/1993 ZOSTER VACCINE AGE 60> 10/31/2003 GLAUCOMA SCREENING Q2Y 10/31/2008 Pneumococcal 65+ High/Highest Risk (1 of 2 - PCV13) 10/31/2008 MEDICARE YEARLY EXAM 12/18/2016 INFLUENZA AGE 9 TO ADULT 8/1/2017 BREAST CANCER SCRN MAMMOGRAM 8/24/2018 Allergies as of 5/9/2017  Review Complete On: 5/9/2017 By: Lacie Sanderson MD  
  
 Severity Noted Reaction Type Reactions Lamictal [Lamotrigine] High 03/08/2017    Anaphylaxis Current Immunizations  Never Reviewed Name Date Influenza Vaccine 10/14/2015 Not reviewed this visit You Were Diagnosed With   
  
 Codes Comments Cutaneous candidiasis    -  Primary ICD-10-CM: B37.2 ICD-9-CM: 112.3 Hyperglycemia     ICD-10-CM: R73.9 ICD-9-CM: 790.29 Elevated transaminase level     ICD-10-CM: R74.0 ICD-9-CM: 790.4 Vitals BP Pulse Temp Resp Height(growth percentile) Weight(growth percentile) 120/65 (BP 1 Location: Left arm, BP Patient Position: Sitting) 64 98.1 °F (36.7 °C) (Oral) 18 5' 5\" (1.651 m) 124 lb (56.2 kg) SpO2 BMI OB Status Smoking Status 99% 20.63 kg/m2 Postmenopausal Never Smoker Vitals History BMI and BSA Data  Body Mass Index Body Surface Area  
 20.63 kg/m 2 1.61 m 2  
  
  
 Preferred Pharmacy Pharmacy Name Phone [address], 9950 [facility] [phone] Your Updated Medication List  
  
   
This list is accurate as of: 5/9/17  4:04 PM.  Always use your most recent med list.  
  
  
  
  
 aspirin, buffered 81 mg Tab Take  by mouth. CALCIUM 600 + D 600-125 mg-unit Tab Generic drug:  calcium-cholecalciferol (d3) Take 1 Tab by mouth two (2) times a day. clopidogrel 75 mg Tab Commonly known as:  PLAVIX TAKE 1 TABLET EVERY DAY  
  
 latanoprost 0.005 % ophthalmic solution Commonly known as:  Chioma Dart Administer 1 Drop to both eyes nightly. LEXAPRO 20 mg tablet Generic drug:  escitalopram oxalate Take 20 mg by mouth daily. metoprolol tartrate 25 mg tablet Commonly known as:  LOPRESSOR  
TAKE 1 TABLET TWICE DAILY  
  
 simvastatin 40 mg tablet Commonly known as:  ZOCOR  
TAKE 1 TABLET EVERY NIGHT  
  
 terbinafine HCl 250 mg tablet Commonly known as:  LAMISIL Take 1 Tab by mouth daily. Prescriptions Sent to Pharmacy Refills  
 terbinafine HCl (LAMISIL) 250 mg tablet 1 Sig: Take 1 Tab by mouth daily. Class: Normal  
 [id number] [address] [place] [address], 1500 [facility] #: [phone] Route: Oral  
  
We Performed the Following CBC WITH AUTOMATED DIFF [36952 CPT(R)] CRP, HIGH SENSITIVITY [43352 CPT(R)] HEMOGLOBIN A1C WITH EAG [14353 CPT(R)] METABOLIC PANEL, COMPREHENSIVE [68132 CPT(R)] Follow-up Instructions Return in about 2 weeks (around 5/23/2017). Patient Instructions Candidiasis: Care Instructions Your Care Instructions Candidiasis (say \"wad-coh-ZU-uh-kirby\") is a yeast infection. Yeast normally lives in your body. But it can cause problems if your body's defenses don't work as they should. Some medicines can increase your chance of getting a yeast infection. These include antibiotics, steroids, and cancer drugs. And some diseases like AIDS and diabetes can make you more likely to get yeast infections. There are different types of yeast infections. Ely Kim is a yeast infection in the mouth. It usually occurs in people with weak immune systems. It causes white patches inside the mouth and throat. Yeast infections of the skin usually occur in skin folds where the skin stays moist. They cause red, oozing patches on your skin. Babies can get these infections under the diaper. People who often wear gloves can get them on their hands. Many women get vaginal yeast infections. They are most common when women take antibiotics. These infections can cause the vagina to itch and burn. They also cause white discharge that looks like cottage cheese. In rare cases, yeast infects the blood. This can cause serious disease. This kind of infection is treated with medicine given through a needle into a vein (IV). After you start treatment, a yeast infection usually goes away quickly. But if your immune system is weak, the infection may come back. Tell your doctor if you get yeast infections often. Follow-up care is a key part of your treatment and safety. Be sure to make and go to all appointments, and call your doctor if you are having problems. It's also a good idea to know your test results and keep a list of the medicines you take. How can you care for yourself at home? · Take your medicines exactly as prescribed. Call your doctor if you think you are having a problem with your medicine. · Use antibiotics only as directed by your doctor. · Eat yogurt with live cultures. It has bacteria called lactobacillus. It may help prevent some types of yeast infections. · Keep your skin clean and dry. Put powder on moist places. · If you are using a cream or suppository to treat a vaginal yeast infection, don't use condoms or a diaphragm. Use a different type of birth control. · Eat a healthy diet and get regular exercise. This will help keep your immune system strong. When should you call for help? Call your doctor now or seek immediate medical care if: 
· You have a fever. · You are pregnant and have signs of a vaginal or urinary tract infection such as: ¨ Severe itching in your vagina. ¨ Pain during sex or when you urinate. ¨ Unusual discharge from your vagina. ¨ A frequent urge to urinate. ¨ Urine that is cloudy or smells bad. Watch closely for changes in your health, and be sure to contact your doctor if: 
· You do not get better as expected. Where can you learn more? Go to http://tanya-mk.info/. Enter R162 in the search box to learn more about \"Candidiasis: Care Instructions. \" Current as of: October 13, 2016 Content Version: 11.2 © 1970-2481 [employer]. Care instructions adapted under license by [employer]) (which disclaims liability or warranty for this information). If you have questions about a medical condition or this instruction, always ask your healthcare professional. [address] any warranty or liability for your use of this information. Introducing [facility] & HEALTH SERVICES! Genie Campoverde introduces [employer] patient portal. Now you can access parts of your medical record, email your doctor's office, and request medication refills online. 1. In your internet browser, go to [email or web address] 2. Click on the First Time User? Click Here link in the Sign In box. You will see the New Member Sign Up page. 3. Enter your [employer] Access Code exactly as it appears below. You will not need to use this code after youve completed the sign-up process. If you do not sign up before the expiration date, you must request a new code. · [employer] Access Code: [email or web address] Expires: 7/22/2017  3:59 PM 
 
4.  Enter the last four digits of your Social Security Number (xxxx) and Date of Birth (mm/dd/yyyy) as indicated and click Submit. You will be taken to the next sign-up page. 5. Create a Rightware Oy ID. This will be your Rightware Oy login ID and cannot be changed, so think of one that is secure and easy to remember. 6. Create a Rightware Oy password. You can change your password at any time. 7. Enter your Password Reset Question and Answer. This can be used at a later time if you forget your password. 8. Enter your e-mail address. You will receive e-mail notification when new information is available in [address] 9. Click Sign Up. You can now view and download portions of your medical record. 10. Click the Download Summary menu link to download a portable copy of your medical information. If you have questions, please visit the Frequently Asked Questions section of the [employer] website. Remember, [employer] is NOT to be used for urgent needs. For medical emergencies, dial 911. Now available from your iPhone and Android! Please provide this summary of care documentation to your next provider. Your primary care clinician is listed as Elina Hope . If you have any questions after today's visit, please call [phone].

## 2017-05-09 NOTE — PROGRESS NOTES
Torin Be is a 66 y.o. female    Issues discussed today include:    1)  Rash:  Started February 2017. Itchy red bumps located on back, chest, pubic area and two patches on the shins and also on scalp. Rash initially on chest and back then spread to other places. Has been constant, only the itching waxes and wanes. Itching got worse over the weekend. She has seen two primary care offices about this and a dermatologist. She has tried 3 courses of oral steroids, numerous topical steroids without relief. She was placed on cyclosporine x 5 days per derm back in March, but no change in sxs after this tx. She has tried several antihistamines (zyrtec, hydroxyzine, benadryl), only benadryl brings some relief. Was started on Lamictal 3 days before the rash started, so thought to be drug rxn. Stopped lamictal > 2 months ago. Hasn't changed diet, detergents, soaps. Living in same home with 3 outdoor dogs. No indoor pets. Hasn't seen any bugs in the home. Lives alone. No known contacts with similar rash. She denies night sweats, weight loss, fever, chills, cough, abdominal pain, nausea, vomiting, bowel changes, food sensitivities. 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  30 Tab 1    metoprolol tartrate (LOPRESSOR) 25 mg tablet TAKE 1 TABLET TWICE DAILY 180 Tab 1    clopidogrel (PLAVIX) 75 mg tab TAKE 1 TABLET EVERY DAY 90 Tab 1    simvastatin (ZOCOR) 40 mg tablet TAKE 1 TABLET EVERY NIGHT 90 Tab 1    Aspirin, Buffered 81 mg tab Take  by mouth.  calcium-cholecalciferol, d3, (CALCIUM 600 + D) 600-125 mg-unit tab Take 1 Tab by mouth two (2) times a day.  latanoprost (XALATAN) 0.005 % ophthalmic solution Administer 1 Drop to both eyes nightly.  VENTOLIN HFA 90 mcg/actuation inhaler       melatonin 5 mg cap capsule       escitalopram oxalate (LEXAPRO) 20 mg tablet Take 20 mg by mouth daily. Allergies: Allergies   Allergen Reactions    Lamictal [Lamotrigine] Anaphylaxis       LMP:  No LMP recorded. Patient is postmenopausal.    Social History     Social History    Marital status:      Spouse name: N/A    Number of children: N/A    Years of education: N/A     Occupational History    Not on file. Social History Main Topics    Smoking status: Never Smoker    Smokeless tobacco: Never Used    Alcohol use No    Drug use: No    Sexual activity: No     Other Topics Concern    Not on file     Social History Narrative       Family History   Problem Relation Age of Onset    Suicide Father     Heart Disease Sister       61    Cancer Brother      bladder cancer       ROS:   Chest Pain:  No  SOB:  No      Physical Exam  Visit Vitals    /65 (BP 1 Location: Left arm, BP Patient Position: Sitting)    Pulse 64    Temp 98.1 °F (36.7 °C) (Oral)    Resp 18    Ht 5' 5\" (1.651 m)    Wt 124 lb (56.2 kg)    SpO2 99%    BMI 20.63 kg/m2     BP Readings from Last 3 Encounters:   17 120/65   17 137/62   17 116/44     Constitutional: Appears well,  No acute distress, Vitals noted  Psychiatric:  Affect normal, Alert and Oriented to person/place/time  Eyes:  Conjunctiva clear, no drainage  ENT:  External ears and nose normal, Teeth and gums appear healthy, Mucous membranes moist  Neck:  General inspection normal. Supple.   Lungs:  Clear to auscultation, good respiratory effort, no wheezes, rales or rhonchi  Abdomen: Soft, nondistended, nontender  Heart:  Normal HR, Normal S1 and S2,  Regular rhythm. No murmurs, rubs or gallops. Extremities:  Without edema, good peripheral pulses  Skin:  Warm to palpation, dry, erythematous, papular rash over upper back, anterior chest wall, bilateral inguinal crease, bilateral anterior shins, scalp SPARING the face, arms, remainder of legs, palms and soles. Areas of more confluent erythema and thickened skin under bilateral breasts in the midline lumbar back. No excoriations, wounds or drainage. KOH Prep of skin scrapings from lower back:  Few hyphae      Assessment/Plan:      ICD-10-CM ICD-9-CM    1. Diffuse papular rash R21 782.1 terbinafine HCl (LAMISIL) 250 mg tablet      HEMOGLOBIN A1C WITH EAG      METABOLIC PANEL, COMPREHENSIVE      CBC WITH AUTOMATED DIFF      CRP, HIGH SENSITIVITY      HIV 1/2 AG/AB, 4TH GENERATION,W RFLX CONFIRM      AMB POC TISSUE EXAM BY KOH SLIDE OF SAMPLES FROM SKIN   2. Pruritic intertrigo L30.4 695.89 terbinafine HCl (LAMISIL) 250 mg tablet      AMB POC TISSUE EXAM BY KOH SLIDE OF SAMPLES FROM SKIN   3. Hyperglycemia R73.9 790.29    4. Elevated transaminase level R74.0 790.4        Possible diffuse cutaneous candidiasis. Ddx also includes atopic vs contact dermatitis, but given no response to steroids and no resolution despite no identifiable environmental triggers these are less likely. No bugs seen in the home or on pets. No systemic sxs to be of concern for fungemia. Still curious why an otherwise healthy person would develop such a widespread fungal rash. Cannot exclude cutaneous lymphoma, autoimmune process. CRP elevated on prior labs. - Will check for immunocompromise with A1c, HIV  - Trend CRP  - Will do trial of terbinafine x 2-4 weeks  - Follow up closely  - Next step if no response would likely be skin biopsy.     Upon chart review and review of recent work up, I noted elevated AST/ALT on prior labs as well as mild hyperglycemia. - Will repeat CMP   - A1c today        Follow-up Disposition:  Return in about 2 weeks (around 5/23/2017). AVS was printed, given to patient and briefly discussed prior to patient's departure from the office today. Patient seen, examined and discussed with attending, Dr. Bety Fleming.  Richard Samson MD  [address] [facility] Medicine Residency  Matt Knapp [address]06  Brianda Carey

## 2017-05-09 NOTE — PATIENT INSTRUCTIONS
Candidiasis: Care Instructions  Your Care Instructions  Candidiasis (say \"suw-npl-HZ-uh-kirby\") is a yeast infection. Yeast normally lives in your body. But it can cause problems if your body's defenses don't work as they should. Some medicines can increase your chance of getting a yeast infection. These include antibiotics, steroids, and cancer drugs. And some diseases like AIDS and diabetes can make you more likely to get yeast infections. There are different types of yeast infections. Shyrl Foil is a yeast infection in the mouth. It usually occurs in people with weak immune systems. It causes white patches inside the mouth and throat. Yeast infections of the skin usually occur in skin folds where the skin stays moist. They cause red, oozing patches on your skin. Babies can get these infections under the diaper. People who often wear gloves can get them on their hands. Many women get vaginal yeast infections. They are most common when women take antibiotics. These infections can cause the vagina to itch and burn. They also cause white discharge that looks like cottage cheese. In rare cases, yeast infects the blood. This can cause serious disease. This kind of infection is treated with medicine given through a needle into a vein (IV). After you start treatment, a yeast infection usually goes away quickly. But if your immune system is weak, the infection may come back. Tell your doctor if you get yeast infections often. Follow-up care is a key part of your treatment and safety. Be sure to make and go to all appointments, and call your doctor if you are having problems. It's also a good idea to know your test results and keep a list of the medicines you take. How can you care for yourself at home? · Take your medicines exactly as prescribed. Call your doctor if you think you are having a problem with your medicine. · Use antibiotics only as directed by your doctor. · Eat yogurt with live cultures.  It has bacteria called lactobacillus. It may help prevent some types of yeast infections. · Keep your skin clean and dry. Put powder on moist places. · If you are using a cream or suppository to treat a vaginal yeast infection, don't use condoms or a diaphragm. Use a different type of birth control. · Eat a healthy diet and get regular exercise. This will help keep your immune system strong. When should you call for help? Call your doctor now or seek immediate medical care if:  · You have a fever. · You are pregnant and have signs of a vaginal or urinary tract infection such as:  ¨ Severe itching in your vagina. ¨ Pain during sex or when you urinate. ¨ Unusual discharge from your vagina. ¨ A frequent urge to urinate. ¨ Urine that is cloudy or smells bad. Watch closely for changes in your health, and be sure to contact your doctor if:  · You do not get better as expected. Where can you learn more? Go to http://tanya-mk.info/. Enter O197 in the search box to learn more about \"Candidiasis: Care Instructions. \"  Current as of: October 13, 2016  Content Version: 11.2  © 5182-2084 [employer]. Care instructions adapted under license by [employer] (which disclaims liability or warranty for this information). If you have questions about a medical condition or this instruction, always ask your healthcare professional. [address] any warranty or liability for your use of this information.

## 2017-05-10 LAB
ALBUMIN SERPL-MCNC: 4.1 G/DL (ref 3.5–4.8)
ALBUMIN/GLOB SERPL: 2.2 {RATIO} (ref 1.2–2.2)
ALP SERPL-CCNC: 49 IU/L (ref 39–117)
ALT SERPL-CCNC: 18 IU/L (ref 0–32)
AST SERPL-CCNC: 19 IU/L (ref 0–40)
BASOPHILS # BLD AUTO: 0 X10E3/UL (ref 0–0.2)
BASOPHILS NFR BLD AUTO: 0 %
BILIRUB SERPL-MCNC: 0.3 MG/DL (ref 0–1.2)
BUN SERPL-MCNC: 15 MG/DL (ref 8–27)
BUN/CREAT SERPL: 19 (ref 12–28)
CALCIUM SERPL-MCNC: 9.4 MG/DL (ref 8.7–10.3)
CHLORIDE SERPL-SCNC: 99 MMOL/L (ref 96–106)
CO2 SERPL-SCNC: 27 MMOL/L (ref 18–29)
CREAT SERPL-MCNC: 0.79 MG/DL (ref 0.57–1)
CRP SERPL HS-MCNC: 11.53 MG/L (ref 0–3)
EOSINOPHIL # BLD AUTO: 0.5 X10E3/UL (ref 0–0.4)
EOSINOPHIL NFR BLD AUTO: 6 %
ERYTHROCYTE [DISTWIDTH] IN BLOOD BY AUTOMATED COUNT: 18.6 % (ref 12.3–15.4)
EST. AVERAGE GLUCOSE BLD GHB EST-MCNC: 108 MG/DL
GLOBULIN SER CALC-MCNC: 1.9 G/DL (ref 1.5–4.5)
GLUCOSE SERPL-MCNC: 100 MG/DL (ref 65–99)
HBA1C MFR BLD: 5.4 % (ref 4.8–5.6)
HCT VFR BLD AUTO: 37.6 % (ref 34–46.6)
HGB BLD-MCNC: 12.3 G/DL (ref 11.1–15.9)
HIV 1+2 AB+HIV1 P24 AG SERPL QL IA: NON REACTIVE
IMM GRANULOCYTES # BLD: 0.1 X10E3/UL (ref 0–0.1)
IMM GRANULOCYTES NFR BLD: 1 %
LYMPHOCYTES # BLD AUTO: 1.2 X10E3/UL (ref 0.7–3.1)
LYMPHOCYTES NFR BLD AUTO: 14 %
MCH RBC QN AUTO: 32.5 PG (ref 26.6–33)
MCHC RBC AUTO-ENTMCNC: 32.7 G/DL (ref 31.5–35.7)
MCV RBC AUTO: 99 FL (ref 79–97)
MONOCYTES # BLD AUTO: 0.7 X10E3/UL (ref 0.1–0.9)
MONOCYTES NFR BLD AUTO: 9 %
NEUTROPHILS # BLD AUTO: 5.9 X10E3/UL (ref 1.4–7)
NEUTROPHILS NFR BLD AUTO: 70 %
PLATELET # BLD AUTO: 211 X10E3/UL (ref 150–379)
POTASSIUM SERPL-SCNC: 4.6 MMOL/L (ref 3.5–5.2)
PROT SERPL-MCNC: 6 G/DL (ref 6–8.5)
RBC # BLD AUTO: 3.79 X10E6/UL (ref 3.77–5.28)
SODIUM SERPL-SCNC: 141 MMOL/L (ref 134–144)
WBC # BLD AUTO: 8.4 X10E3/UL (ref 3.4–10.8)

## 2017-05-11 NOTE — PROGRESS NOTES
I called patient and discussed recent results below - she tells she got a last minute appt with dermatology today bc they had cancellation:  A1c - normal 5.4%, no evidence of diabetes  HIV - negative, you do not have HIV  CMP - Your electrolytes, kidney and liver function are normal.  CBC - Your blood counts are normal except for a few parameters. You do not have anemia, signs of infection or problems with clotting cells. Only your eosinophil count is slightly elevated - consistent with how it has been in the last few months, which may give us some clues for your skin condition if you do not get a good response with the anti-fungal treatment.   CRP (an inflammatory marker) - still high, but trending in the right direction  Pt to take this information to her derm appt today and will ask them to send records or call us with update on their recs

## 2017-05-12 LAB — KOH PREP SKIN, KOHPOCT: NORMAL

## 2017-05-12 RX ORDER — ALBUTEROL SULFATE 90 UG/1
AEROSOL, METERED RESPIRATORY (INHALATION)
COMMUNITY
Start: 2017-03-13 | End: 2018-06-06

## 2017-05-12 RX ORDER — MELATONIN 5 MG
CAPSULE ORAL
COMMUNITY
Start: 2017-03-24 | End: 2018-06-06

## 2017-06-13 DIAGNOSIS — E78.2 MIXED HYPERLIPIDEMIA: ICD-10-CM

## 2017-06-13 NOTE — LETTER
6/15/2017 1:21 PM 
 
Ms. Jaja Lindquist [address] You are on a medication which requires routine monitoring of lab work. Your last Lipid panel was on 2015 and we recommend this be done every 6 months. Enclosed you will find lab slips which you may take to the [facility] location closest to your residence. Please have them done as soon as you are able, to avoid any delay in our ability to refill your medications. If you have had them completed recently with PCP, please have them faxed to our office at [phone].  
 
 
 
 
 
 
 
Sincerely, 
 
 
Makayla Cortez MD

## 2017-06-15 RX ORDER — SIMVASTATIN 40 MG/1
TABLET, FILM COATED ORAL
Qty: 90 TAB | Refills: 0 | Status: SHIPPED | OUTPATIENT
Start: 2017-06-15 | End: 2017-10-12 | Stop reason: SDUPTHER

## 2017-06-15 RX ORDER — CLOPIDOGREL BISULFATE 75 MG/1
TABLET ORAL
Qty: 90 TAB | Refills: 1 | Status: SHIPPED | OUTPATIENT
Start: 2017-06-15 | End: 2017-12-26 | Stop reason: SDUPTHER

## 2017-06-15 RX ORDER — METOPROLOL TARTRATE 25 MG/1
TABLET, FILM COATED ORAL
Qty: 180 TAB | Refills: 1 | Status: SHIPPED | OUTPATIENT
Start: 2017-06-15 | End: 2017-12-26 | Stop reason: SDUPTHER

## 2017-06-15 NOTE — TELEPHONE ENCOUNTER
Request for metoprolol 25mg twice a day, plavix 75mg every day, and zocor 40mg every day. Last office visit 2/1/17, next office visit 8/16/17. Refills per verbal order from Dr. Wicho Gill.  No recent lipid panel. Mailed letter and lab slip to check lipid panel.

## 2017-08-14 ENCOUNTER — HOSPITAL ENCOUNTER (EMERGENCY)
Age: 72
Discharge: HOME | End: 2017-08-14
Attending: EMERGENCY MEDICINE | Admitting: EMERGENCY MEDICINE
Payer: MEDICARE

## 2017-08-14 VITALS
TEMPERATURE: 98.1 F | RESPIRATION RATE: 16 BRPM | HEIGHT: 65 IN | BODY MASS INDEX: 20.83 KG/M2 | DIASTOLIC BLOOD PRESSURE: 80 MMHG | WEIGHT: 125 LBS | HEART RATE: 70 BPM | SYSTOLIC BLOOD PRESSURE: 176 MMHG | OXYGEN SATURATION: 100 %

## 2017-08-14 DIAGNOSIS — M54.50 ACUTE BILATERAL LOW BACK PAIN WITHOUT SCIATICA: Primary | ICD-10-CM

## 2017-08-14 PROCEDURE — 96372 THER/PROPH/DIAG INJ SC/IM: CPT

## 2017-08-14 PROCEDURE — 74011250636 HC RX REV CODE- 250/636: Performed by: EMERGENCY MEDICINE

## 2017-08-14 PROCEDURE — 99282 EMERGENCY DEPT VISIT SF MDM: CPT

## 2017-08-14 RX ORDER — HYDROCODONE BITARTRATE AND ACETAMINOPHEN 5; 325 MG/1; MG/1
1 TABLET ORAL
Qty: 12 TAB | Refills: 0 | Status: SHIPPED | OUTPATIENT
Start: 2017-08-14 | End: 2017-08-30 | Stop reason: ALTCHOICE

## 2017-08-14 RX ORDER — KETOROLAC TROMETHAMINE 30 MG/ML
30 INJECTION, SOLUTION INTRAMUSCULAR; INTRAVENOUS ONCE
Status: COMPLETED | OUTPATIENT
Start: 2017-08-14 | End: 2017-08-14

## 2017-08-14 RX ORDER — CYCLOBENZAPRINE HCL 5 MG
5 TABLET ORAL
Qty: 12 TAB | Refills: 0 | Status: SHIPPED | OUTPATIENT
Start: 2017-08-14 | End: 2017-08-30

## 2017-08-14 RX ADMIN — KETOROLAC TROMETHAMINE 30 MG: 30 INJECTION, SOLUTION INTRAMUSCULAR at 20:58

## 2017-08-15 NOTE — ED PROVIDER NOTES
HPI Comments: 66 y.o. female with past medical history significant for spinal stenosis, GERD and COPD who presents with chief complaint of back pain. Pt reports she had onset last night of lower back pain-- \"like spasms\". Pt reports intermittent severe back pain since onset. Pt states pain is mostly on her right side and radiates to her right hip. Pt reports she was \"slumpted over\" in a chair for ~2 hours yesterday, which she suspects caused onset of pain. Pt reports hx of back pain, for which she saw an orthopedist and had an MRI 8 months ago. Pt reports after MRI she was prompted to go to PT, which she initially found relief in-- \"but it has come back with a vengeance\". Pt reports partial right hip replacement in  and states she has had complete right hip replacement since. Pt reports she took a leftover hydrocodone today, with some relief. Pt denies numbness, tingling, weakness, bowel and urinary incontinence. There are no other acute medical concerns at this time. PCP: sEteban Stewart MD    Note written by Brian Culp, as dictated by Cuauhtemoc Bond MD 8:48 PM      The history is provided by the patient. Past Medical History:   Diagnosis Date    COPD (chronic obstructive pulmonary disease) ([facility] [address] 2016    Coronary atherosclerosis of native coronary artery 2006    MI/Left ventricular wall motion is segmentally abnormal with mild hypokinesis of the anterior wall. Ejection Fraction is estimated at 50%. LAD >90% stenosis. LCX 50% stenosis.  Elevated BP 2017    Family history of suicide     father    GERD (gastroesophageal reflux disease)     Glaucoma     Mixed hyperlipidemia     Osteopenia 2015    DEXA ,     S/P angioplasty with stent     stent to LAD. 3X8mm Cypher.         Past Surgical History:   Procedure Laterality Date    HX  SECTION      HX COLONOSCOPY      reportedly normal    HX ENDOSCOPY      reportedly normal    HX HEART CATHETERIZATION      Left ventricular wall motion is segmentally abnormal with mild hypokinesis of the anterior wall. Ejection Fraction is estimated at 50%. LAD >90% stenosis. LCX 50% stenosis.  HX HEART CATHETERIZATION      2 stents placed    HX ORTHOPAEDIC  8/10/15    hip replacement    HX PTCA      stent to LAD. 3X8mm Cypher.  US GUIDED CORE BREAST BIOPSY Left 2010         Family History:   Problem Relation Age of Onset    Suicide Father     Heart Disease Sister       61    Cancer Brother      bladder cancer       Social History     Social History    Marital status:      Spouse name: N/A    Number of children: N/A    Years of education: N/A     Occupational History    Not on file. Social History Main Topics    Smoking status: Never Smoker    Smokeless tobacco: Never Used    Alcohol use No    Drug use: No    Sexual activity: No     Other Topics Concern    Not on file     Social History Narrative         ALLERGIES: Lamictal [lamotrigine]    Review of Systems   Musculoskeletal: Positive for arthralgias and back pain. Neurological: Negative for weakness and numbness. All other systems reviewed and are negative. Vitals:    177   BP: 186/86   Pulse: 73   Resp: 18   Temp: 98.1 °F (36.7 °C)   SpO2: 100%   Weight: 56.7 kg (125 lb)   Height: 5' 5\" (1.651 m)            Physical Exam   Constitutional: She is oriented to person, place, and time. She appears well-developed and well-nourished. No distress. Elderly   HENT:   Head: Normocephalic and atraumatic. Eyes: Conjunctivae are normal. No scleral icterus. Neck: Neck supple. No tracheal deviation present. Cardiovascular: Normal rate, regular rhythm, normal heart sounds and intact distal pulses. Exam reveals no gallop and no friction rub. No murmur heard. Pulmonary/Chest: Effort normal and breath sounds normal. She has no wheezes. She has no rales. Abdominal: Soft. She exhibits no distension. There is no tenderness. There is no rebound and no guarding. Musculoskeletal: She exhibits no edema. Reproducible pain with movement  No back tenderness   Neurological: She is alert and oriented to person, place, and time. strength and sensation normal.     Skin: Skin is warm and dry. No rash noted. Psychiatric: She has a normal mood and affect. Nursing note and vitals reviewed. Note written by Brian Mak, as dictated by Anna Mohamud MD 8:48 PM     [facility]  ED Course       Procedures    A/P: back pain c/w muscle spasms with underlying spinal stenosis - normal neuro exam; Flexeril, Lortab, spine f/u.   Anna Mohamud MD

## 2017-08-15 NOTE — DISCHARGE INSTRUCTIONS
Back Pain: Care Instructions  Your Care Instructions    Back pain has many possible causes. It is often related to problems with muscles and ligaments of the back. It may also be related to problems with the nerves, discs, or bones of the back. Moving, lifting, standing, sitting, or sleeping in an awkward way can strain the back. Sometimes you don't notice the injury until later. Arthritis is another common cause of back pain. Although it may hurt a lot, back pain usually improves on its own within several weeks. Most people recover in 12 weeks or less. Using good home treatment and being careful not to stress your back can help you feel better sooner. Follow-up care is a key part of your treatment and safety. Be sure to make and go to all appointments, and call your doctor if you are having problems. Its also a good idea to know your test results and keep a list of the medicines you take. How can you care for yourself at home? · Sit or lie in positions that are most comfortable and reduce your pain. Try one of these positions when you lie down:  ¨ Lie on your back with your knees bent and supported by large pillows. ¨ Lie on the floor with your legs on the seat of a sofa or chair. Jeffory Amass on your side with your knees and hips bent and a pillow between your legs. ¨ Lie on your stomach if it does not make pain worse. · Do not sit up in bed, and avoid soft couches and twisted positions. Bed rest can help relieve pain at first, but it delays healing. Avoid bed rest after the first day of back pain. · Change positions every 30 minutes. If you must sit for long periods of time, take breaks from sitting. Get up and walk around, or lie in a comfortable position. · Try using a heating pad on a low or medium setting for 15 to 20 minutes every 2 or 3 hours. Try a warm shower in place of one session with the heating pad. · You can also try an ice pack for 10 to 15 minutes every 2 to 3 hours.  Put a thin cloth between the ice pack and your skin. · Take pain medicines exactly as directed. ¨ If the doctor gave you a prescription medicine for pain, take it as prescribed. ¨ If you are not taking a prescription pain medicine, ask your doctor if you can take an over-the-counter medicine. · Take short walks several times a day. You can start with 5 to 10 minutes, 3 or 4 times a day, and work up to longer walks. Walk on level surfaces and avoid hills and stairs until your back is better. · Return to work and other activities as soon as you can. Continued rest without activity is usually not good for your back. · To prevent future back pain, do exercises to stretch and strengthen your back and stomach. Learn how to use good posture, safe lifting techniques, and proper body mechanics. When should you call for help? Call your doctor now or seek immediate medical care if:  · You have new or worsening numbness in your legs. · You have new or worsening weakness in your legs. (This could make it hard to stand up.)  · You lose control of your bladder or bowels. Watch closely for changes in your health, and be sure to contact your doctor if:  · Your pain gets worse. · You are not getting better after 2 weeks. Where can you learn more? Go to http://tanya-mk.info/. Enter T289 in the search box to learn more about \"Back Pain: Care Instructions. \"  Current as of: March 21, 2017  Content Version: 11.3  © 9434-9949 [employer]. Care instructions adapted under license by [employer] (which disclaims liability or warranty for this information). If you have questions about a medical condition or this instruction, always ask your healthcare professional. [address] any warranty or liability for your use of this information. We hope that we have addressed all of your medical concerns.  The examination and treatment you received in the Emergency Department were for an emergent problem and were not intended as complete care. It is important that you follow up with your healthcare provider(s) for ongoing care. If your symptoms worsen or do not improve as expected, and you are unable to reach your usual health care provider(s), you should return to the Emergency Department. Today's healthcare is undergoing tremendous change, and patient satisfaction surveys are one of the many tools to assess the quality of medical care. You may receive a survey from the [employer] regarding your experience in the Emergency Department. I hope that your experience has been completely positive, particularly the medical care that I provided. As such, please participate in the survey; anything less than excellent does not meet my expectations or intentions. [facility] and [address] [place] [address] participate in nationally recognized quality of care measures. If your blood pressure is greater than 120/80, as reported below, we urge that you seek medical care to address the potential of high blood pressure, commonly known as hypertension. Hypertension can be hereditary or can be caused by certain medical conditions, pain, stress, or \"white coat syndrome. \"       Please make an appointment with your health care provider(s) for follow up of your Emergency Department visit. VITALS:   Patient Vitals for the past 8 hrs:   Temp Pulse Resp BP SpO2   08/14/17 2027 98.1 °F (36.7 °C) 73 18 186/86 100 %          Thank you for allowing us to provide you with medical care today. We realize that you have many choices for your emergency care needs. Please choose us in the future for any continued health care needs.       Keenan Dickerson MD    [facility].   Office: [phone]

## 2017-08-30 ENCOUNTER — HOSPITAL ENCOUNTER (OUTPATIENT)
Dept: MAMMOGRAPHY | Age: 72
Discharge: HOME | End: 2017-08-30
Attending: FAMILY MEDICINE
Payer: MEDICARE

## 2017-08-30 ENCOUNTER — OFFICE VISIT (OUTPATIENT)
Dept: CARDIOLOGY CLINIC | Age: 72
End: 2017-08-30

## 2017-08-30 VITALS
BODY MASS INDEX: 21.02 KG/M2 | OXYGEN SATURATION: 95 % | WEIGHT: 126.2 LBS | RESPIRATION RATE: 16 BRPM | DIASTOLIC BLOOD PRESSURE: 56 MMHG | HEART RATE: 78 BPM | SYSTOLIC BLOOD PRESSURE: 154 MMHG | HEIGHT: 65 IN

## 2017-08-30 DIAGNOSIS — J44.9 CHRONIC OBSTRUCTIVE PULMONARY DISEASE, UNSPECIFIED COPD TYPE (HCC): ICD-10-CM

## 2017-08-30 DIAGNOSIS — E78.2 MIXED HYPERLIPIDEMIA: ICD-10-CM

## 2017-08-30 DIAGNOSIS — Z12.31 VISIT FOR SCREENING MAMMOGRAM: ICD-10-CM

## 2017-08-30 DIAGNOSIS — I25.10 ATHEROSCLEROSIS OF NATIVE CORONARY ARTERY OF NATIVE HEART WITHOUT ANGINA PECTORIS: Primary | ICD-10-CM

## 2017-08-30 DIAGNOSIS — I10 ESSENTIAL HYPERTENSION: ICD-10-CM

## 2017-08-30 PROBLEM — R03.0 ELEVATED BP WITHOUT DIAGNOSIS OF HYPERTENSION: Status: ACTIVE | Noted: 2017-02-01

## 2017-08-30 PROBLEM — R03.0 ELEVATED BP WITHOUT DIAGNOSIS OF HYPERTENSION: Status: RESOLVED | Noted: 2017-02-01 | Resolved: 2017-08-30

## 2017-08-30 PROCEDURE — 77067 SCR MAMMO BI INCL CAD: CPT

## 2017-08-30 RX ORDER — GABAPENTIN 300 MG/1
300 CAPSULE ORAL 3 TIMES DAILY
COMMUNITY
End: 2018-06-06

## 2017-08-30 RX ORDER — LISINOPRIL 5 MG/1
5 TABLET ORAL DAILY
Qty: 90 TAB | Refills: 3 | Status: SHIPPED | OUTPATIENT
Start: 2017-08-30 | End: 2018-03-01 | Stop reason: ALTCHOICE

## 2017-08-30 RX ORDER — LISINOPRIL 5 MG/1
TABLET ORAL DAILY
COMMUNITY
End: 2017-08-30 | Stop reason: SDUPTHER

## 2017-08-30 RX ORDER — TRIAMCINOLONE ACETONIDE 1 MG/ML
LOTION TOPICAL AS NEEDED
COMMUNITY
End: 2018-06-06

## 2017-08-30 NOTE — PATIENT INSTRUCTIONS
Patient to start Lisinopril 5 mg in 1 month and be seen in office in 2 months. Faxed new rx to [facility] [place] [facility].

## 2017-08-30 NOTE — PROGRESS NOTES
Chief Complaint   Patient presents with    Follow-up     6mo    Coronary Artery Disease    Hypertension     Visit Vitals    /56 (BP 1 Location: Right arm, BP Patient Position: Sitting)    Pulse 78    Resp 16    Ht 5' 5\" (1.651 m)    Wt 126 lb 3.2 oz (57.2 kg)    SpO2 95%    BMI 21 kg/m2     Pt presents today with c/o H/A and SOB.      Medication changes made per VO of Dr. Ivett Mcdaniel.

## 2017-08-30 NOTE — PROGRESS NOTES
Ashley Sharma     1945       Ofelia Nelson MD, [facility]  Date of Visit-8/30/2017   PCP is Alveria Koyanagi, MD   [facility]  [facility]  HPI:  Ashley Sharma is a 66 y.o. female     Follow-up of CAD. Patient has had multiple ER visits for rash, back pain, cough, but no cardiac issues. Denies chest pain, chest pressure, or URBANO. Has usual SOB, at baseline. She has not been checking her blood pressure at home, and her pressure is somewhat elevated in the office today. She has been taking Metoprolol, Aspirin, Clopidogrel, and Simvastatin. She notes she recently started Gabapentin, which seems to be causing some headaches. Of note, she developed rash from taking Lamictal.    Pos for + URBANO and dizziness, rash  Denies chest pain, edema, syncope or shortness of breath at rest, has no tachycardia, palpitations or sense of arrhythmia. Assessment/Plan:   The primary encounter diagnosis was Atherosclerosis of native coronary artery of native heart without angina pectoris. Diagnoses of Essential hypertension, Mixed hyperlipidemia, and Chronic obstructive pulmonary disease, unspecified COPD type ([address] were also pertinent to this visit. 1.  CAD with no angina, continue DAPT, beta blocker and asa. Prior Nuc 2016, no ischemia, EF 73%. 2.  HTN:  Remains elevated. Will add Lisinopril 5mg daily to her regimen once her rash/dermatitis has resolved, in 1 month. She is to call if her pressure remains elevated about 140. Follow-up in 2 months. 3.  Dyslipidemia:  On statin. 4.  COPD:  Chronic mild SOB.     Future Appointments  Date Time Provider Calista Bardales   11/1/2017 11:20 AM Jose Luis Saldana MD [facility]          Cardiac History:   NUKE 3-1-13= walked 5 minutes, STT 3 mm horizontal, normal perfusion, ef 70%  ECHO 3-1-13= 55-60%, mild MR,AR,TR, Aortic sclerosis without stenosis  Cardiac Cath 1-8-14-Successful GORDON pLAD (2.75x18 Xience). RFA mynx, RFV manual.     ROS-except as noted above. . A complete cardiac and respiratory are reviewed and negative except as above ; Resp-denies wheezing  or productive cough,. Const- No unusual weight loss or fever; Neuro-no recent seizure or CVA ; GI- No BRBPR, abdom pain, bloating ; - no  hematuria   see supplement sheet, initialed and to be scanned by staff  Past Medical History:   Diagnosis Date    COPD (chronic obstructive pulmonary disease) ([facility] [address] 1/7/2016    Coronary atherosclerosis of native coronary artery 2006    MI/Left ventricular wall motion is segmentally abnormal with mild hypokinesis of the anterior wall. Ejection Fraction is estimated at 50%. LAD >90% stenosis. LCX 50% stenosis.  Elevated BP 2/1/2017    Family history of suicide     father    GERD (gastroesophageal reflux disease)     Glaucoma     Mixed hyperlipidemia     Osteopenia 2015    DEXA 2010, 2015    S/P angioplasty with stent     stent to LAD. 3X8mm Cypher. Social Hx= reports that she has never smoked. She has never used smokeless tobacco. She reports that she does not drink alcohol or use illicit drugs. Exam and Labs:    Visit Vitals    /56 (BP 1 Location: Right arm, BP Patient Position: Sitting)    Pulse 78    Resp 16    Ht 5' 5\" (1.651 m)    Wt 126 lb 3.2 oz (57.2 kg)    SpO2 95%    BMI 21 kg/m2      Constitutional:  NAD, comfortable  Head: NC,AT. Eyes: No scleral icterus. Neck:  Neck supple. No JVD present. Throat: moist mucous membranes. Chest: Effort normal & normal respiratory excursion . Neurological: alert, conversant and oriented . Skin: Skin is not cold. No obvious systemic rash noted. Not diaphoretic. No erythema. Psychiatric:  Grossly normal mood and affect. Behavior appears normal. Extremities:  no clubbing or cyanosis. Abdomen: non distended    Lungs:breath sounds normal. No stridor. distress, wheezes or  Rales.   Heart:    normal rate, regular rhythm, normal S1, S2, no murmurs, rubs, clicks or gallops , PMI non displaced. Edema: Edema is none. Lab Results   Component Value Date/Time    Sodium 141 05/09/2017 04:12 PM    Potassium 4.6 05/09/2017 04:12 PM    Chloride 99 05/09/2017 04:12 PM    CO2 27 05/09/2017 04:12 PM    Anion gap 7 03/09/2017 12:13 AM    Glucose 100 05/09/2017 04:12 PM    BUN 15 05/09/2017 04:12 PM    Creatinine 0.79 05/09/2017 04:12 PM    BUN/Creatinine ratio 19 05/09/2017 04:12 PM    GFR est AA 86 05/09/2017 04:12 PM    GFR est non-AA 74 05/09/2017 04:12 PM    Calcium 9.4 05/09/2017 04:12 PM      Wt Readings from Last 3 Encounters:   08/30/17 126 lb 3.2 oz (57.2 kg)   08/14/17 125 lb (56.7 kg)   05/09/17 124 lb (56.2 kg)      BP Readings from Last 3 Encounters:   08/30/17 154/56   08/14/17 176/80   05/09/17 120/65        Current Outpatient Prescriptions   Medication Sig    HYDROcodone-acetaminophen (NORCO) 5-325 mg per tablet Take 1 Tab by mouth every four (4) hours as needed for Pain. Max Daily Amount: 6 Tabs.  cyclobenzaprine (FLEXERIL) 5 mg tablet Take 1 Tab by mouth three (3) times daily as needed for Muscle Spasm(s).  metoprolol tartrate (LOPRESSOR) 25 mg tablet TAKE 1 TABLET TWICE DAILY    clopidogrel (PLAVIX) 75 mg tab TAKE 1 TABLET EVERY DAY    simvastatin (ZOCOR) 40 mg tablet TAKE 1 TABLET EVERY NIGHT    VENTOLIN HFA 90 mcg/actuation inhaler     melatonin 5 mg cap capsule     terbinafine HCl (LAMISIL) 250 mg tablet Take 1 Tab by mouth daily.  escitalopram oxalate (LEXAPRO) 20 mg tablet Take 20 mg by mouth daily.  Aspirin, Buffered 81 mg tab Take  by mouth.  calcium-cholecalciferol, d3, (CALCIUM 600 + D) 600-125 mg-unit tab Take 1 Tab by mouth two (2) times a day.  latanoprost (XALATAN) 0.005 % ophthalmic solution Administer 1 Drop to both eyes nightly. No current facility-administered medications for this visit. Impression see above.     Written by Adele Padgett ([facility]) as dictated by Dr. Jon Foster.

## 2017-10-11 ENCOUNTER — OFFICE VISIT (OUTPATIENT)
Dept: FAMILY MEDICINE CLINIC | Age: 72
End: 2017-10-11

## 2017-10-11 VITALS
SYSTOLIC BLOOD PRESSURE: 150 MMHG | HEIGHT: 65 IN | TEMPERATURE: 98.9 F | HEART RATE: 70 BPM | OXYGEN SATURATION: 100 % | DIASTOLIC BLOOD PRESSURE: 67 MMHG | RESPIRATION RATE: 16 BRPM | BODY MASS INDEX: 21.16 KG/M2 | WEIGHT: 127 LBS

## 2017-10-11 DIAGNOSIS — R30.0 DYSURIA: Primary | ICD-10-CM

## 2017-10-11 LAB
BILIRUB UR QL STRIP: NEGATIVE
GLUCOSE UR-MCNC: NEGATIVE MG/DL
KETONES P FAST UR STRIP-MCNC: NEGATIVE MG/DL
PH UR STRIP: 6 [PH] (ref 4.6–8)
PROT UR QL STRIP: NORMAL MG/DL
SP GR UR STRIP: 1.01 (ref 1–1.03)
UA UROBILINOGEN AMB POC: NORMAL (ref 0.2–1)
URINALYSIS CLARITY POC: CLEAR
URINALYSIS COLOR POC: YELLOW
URINE BLOOD POC: NORMAL
URINE LEUKOCYTES POC: NORMAL
URINE NITRITES POC: NEGATIVE

## 2017-10-11 RX ORDER — CEPHALEXIN 500 MG/1
500 CAPSULE ORAL 2 TIMES DAILY
Qty: 14 CAP | Refills: 0 | Status: SHIPPED | OUTPATIENT
Start: 2017-10-11 | End: 2017-10-18

## 2017-10-11 NOTE — MR AVS SNAPSHOT
Visit Information Date & Time Provider Department Dept. Phone Encounter #  
 10/11/2017  7:30 PM Sloane Mckeon MD [facility] [phone] [id number] Follow-up Instructions Return if symptoms worsen or fail to improve. Your Appointments 10/11/2017  7:30 PM  
WALK IN with Sloane Mckeon MD  
[facility]) Appt Note: UTI  
 [address] [facility]  
[phone]  
  
   
 [address] [place] [address]  
  
    
 11/1/2017 11:20 AM  
ESTABLISHED PATIENT with Efren Rodas MD  
[facility] ([facility] SCHEDULING) Appt Note: 2 mo fup  
 [address] [facility]  
[address] [address] [address] [facility] Up[facility] Health Maintenance Date Due DTaP/Tdap/Td series (1 - Tdap) 10/31/1964 FOBT Q 1 YEAR AGE 50-75 10/31/1993 ZOSTER VACCINE AGE 60> 8/31/2003 GLAUCOMA SCREENING Q2Y 10/31/2008 Pneumococcal 65+ High/Highest Risk (1 of 2 - PCV13) 10/31/2008 MEDICARE YEARLY EXAM 12/18/2016 INFLUENZA AGE 9 TO ADULT 8/1/2017 BREAST CANCER SCRN MAMMOGRAM 8/30/2019 Allergies as of 10/11/2017  Review Complete On: 10/11/2017 By: Sloane Mckeon MD  
  
 Severity Noted Reaction Type Reactions Lamictal [Lamotrigine] High 03/08/2017    Anaphylaxis Current Immunizations  Never Reviewed Name Date Influenza Vaccine 10/14/2015 Not reviewed this visit You Were Diagnosed With   
  
 Codes Comments Dysuria    -  Primary ICD-10-CM: R30.0 ICD-9-CM: 699. 1 Vitals BP Pulse Temp Resp Height(growth percentile) Weight(growth percentile) 150/67 (BP 1 Location: Left arm, BP Patient Position: Sitting) 70 98.9 °F (37.2 °C) (Oral) 16 5' 5\" (1.651 m) 127 lb (57.6 kg) SpO2 BMI OB Status Smoking Status 100% 21.13 kg/m2 Postmenopausal Never Smoker Vitals History BMI and BSA Data Body Mass Index Body Surface Area  
 21.13 kg/m 2 1.63 m 2 Preferred Pharmacy Pharmacy Name Phone [facility], [address] [facility] [phone] Your Updated Medication List  
  
   
This list is accurate as of: 10/11/17  6:52 PM.  Always use your most recent med list.  
  
  
  
  
 aspirin, buffered 81 mg Tab Take  by mouth. CALCIUM 600 + D 600-125 mg-unit Tab Generic drug:  calcium-cholecalciferol (d3) Take 1 Tab by mouth two (2) times a day. cephALEXin 500 mg capsule Commonly known as:  Arabella Slot Take 1 Cap by mouth two (2) times a day for 7 days. clopidogrel 75 mg Tab Commonly known as:  PLAVIX TAKE 1 TABLET EVERY DAY  
  
 gabapentin 300 mg capsule Commonly known as:  NEURONTIN Take 300 mg by mouth three (3) times daily. latanoprost 0.005 % ophthalmic solution Commonly known as:  Sweetie Hark Administer 1 Drop to both eyes nightly. LEXAPRO 20 mg tablet Generic drug:  escitalopram oxalate Take 20 mg by mouth daily. lisinopril 5 mg tablet Commonly known as:  Brandie Argentina Take 1 Tab by mouth daily. melatonin 5 mg Cap capsule  
  
 metoprolol tartrate 25 mg tablet Commonly known as:  LOPRESSOR  
TAKE 1 TABLET TWICE DAILY  
  
 simvastatin 40 mg tablet Commonly known as:  ZOCOR  
TAKE 1 TABLET EVERY NIGHT  
  
 triamcinolone 0.1 % lotion Commonly known as:  KENALOG Apply  to affected area as needed. use thin layer VENTOLIN HFA 90 mcg/actuation inhaler Generic drug:  albuterol Prescriptions Sent to Pharmacy Refills  
 cephALEXin (KEFLEX) 500 mg capsule 0 Sig: Take 1 Cap by mouth two (2) times a day for 7 days. Class: Normal  
 [facility]acy: [id number] [address] [place] [address], [address] [place] [address] Ph #: [phone] Route: Oral  
  
We Performed the Following AMB POC URINALYSIS DIP STICK AUTO W/O MICRO [78525 CPT(R)] CULTURE, URINE [id number] CPT(R)] Follow-up Instructions Return if symptoms worsen or fail to improve. Patient Instructions Painful Urination (Dysuria): Care Instructions Your Care Instructions Burning pain with urination (dysuria) is a common symptom of a urinary tract infection or other urinary problems. The bladder may become inflamed. This can cause pain when the bladder fills and empties. You may also feel pain if the tube that carries urine from the bladder to the outside of the body (urethra) gets irritated or infected. Sexually transmitted infections (STIs) also may cause pain when you urinate. Sometimes the pain can be caused by things other than an infection. The urethra can be irritated by soaps, perfumes, or foreign objects in the urethra. Kidney stones can cause pain when they pass through the urethra. The cause may be hard to find. You may need tests. Treatment for painful urination depends on the cause. Follow-up care is a key part of your treatment and safety. Be sure to make and go to all appointments, and call your doctor if you are having problems. It's also a good idea to know your test results and keep a list of the medicines you take. How can you care for yourself at home? · Drink extra water for the next day or two. This will help make the urine less concentrated. (If you have kidney, heart, or liver disease and have to limit fluids, talk with your doctor before you increase the amount of fluids you drink.) · Avoid drinks that are carbonated or have caffeine. They can irritate the bladder. · Urinate often. Try to empty your bladder each time. For women: · Urinate right after you have sex. · After going to the bathroom, wipe from front to back. · Avoid douches, bubble baths, and feminine hygiene sprays. And avoid other feminine hygiene products that have deodorants. When should you call for help? Call your doctor now or seek immediate medical care if: 
· You have new symptoms, such as fever, nausea, or vomiting. · You have new or worse symptoms of a urinary problem. For example: ¨ You have blood or pus in your urine. ¨ You have chills or body aches. ¨ It hurts worse to urinate. ¨ You have groin or belly pain. ¨ You have pain in your back just below your rib cage (the flank area). Watch closely for changes in your health, and be sure to contact your doctor if you have any problems. Where can you learn more? Go to http://tanya-mk.info/. Enter J916 in the search box to learn more about \"Painful Urination (Dysuria): Care Instructions. \" Current as of: November 28, 2016 Content Version: 11.3 © 6613-2095 Rated People. Care instructions adapted under license by [employer] (which disclaims liability or warranty for this information). If you have questions about a medical condition or this instruction, always ask your healthcare professional. [address] any warranty or liability for your use of this information. Introducing [facility] & HE[facility] SERVICES! Shaggy Vora introduces Xanitos patient portal. Now you can access parts of your medical record, email your doctor's office, and request medication refills online. 1. In your internet browser, go to [email or web address] 2. Click on the First Time User? Click Here link in the Sign In box. You will see the New Member Sign Up page. 3. Enter your Xanitos Access Code exactly as it appears below. You will not need to use this code after youve completed the sign-up process. If you do not sign up before the expiration date, you must request a new code. · Xan[employer] Access Code: [email or web address] Expires: 10/30/2017  9:58 AM 
 
4.  Enter the last four digits of your Social Security Number (xxxx) and Date of Birth (mm/dd/yyyy) as indicated and click Submit. You will be taken to the next sign-up page. 5. Create a Howcast ID. This will be your Howcast login ID and cannot be changed, so think of one that is secure and easy to remember. 6. Create a Howcast password. You can change your password at any time. 7. Enter your Password Reset Question and Answer. This can be used at a later time if you forget your password. 8. Enter your e-mail address. You will receive e-mail notification when new information is available in [address] 9. Click Sign Up. You can now view and download portions of your medical record. 10. Click the Download Summary menu link to download a portable copy of your medical information. If you have questions, please visit the Frequently Asked Questions section of the Howcast website. Remember, Howcast is NOT to be used for urgent needs. For medical emergencies, dial 911. Now available from your iPhone and Android! Please provide this summary of care documentation to your next provider. Your primary care clinician is listed as Arlene Rogers. If you have any questions after today's visit, please call [phone].

## 2017-10-11 NOTE — PROGRESS NOTES
Subjective  Yris King is an 66 y.o. female who presents for   Chief Complaint   Patient presents with    Bladder Infection     started today    Dysuria     Reports pain with urination and blood in urine. Increased urgency. Symptoms started today. No fever. No change in chronic back pain. Allergies - reviewed: Allergies   Allergen Reactions    Lamictal [Lamotrigine] Anaphylaxis         Medications - reviewed:   Current Outpatient Prescriptions   Medication Sig    cephALEXin (KEFLEX) 500 mg capsule Take 1 Cap by mouth two (2) times a day for 7 days.  lisinopril (PRINIVIL, ZESTRIL) 5 mg tablet Take 1 Tab by mouth daily.  metoprolol tartrate (LOPRESSOR) 25 mg tablet TAKE 1 TABLET TWICE DAILY    clopidogrel (PLAVIX) 75 mg tab TAKE 1 TABLET EVERY DAY    simvastatin (ZOCOR) 40 mg tablet TAKE 1 TABLET EVERY NIGHT    escitalopram oxalate (LEXAPRO) 20 mg tablet Take 20 mg by mouth daily.  Aspirin, Buffered 81 mg tab Take  by mouth.  latanoprost (XALATAN) 0.005 % ophthalmic solution Administer 1 Drop to both eyes nightly.  gabapentin (NEURONTIN) 300 mg capsule Take 300 mg by mouth three (3) times daily.  triamcinolone (KENALOG) 0.1 % lotion Apply  to affected area as needed. use thin layer    VENTOLIN HFA 90 mcg/actuation inhaler     melatonin 5 mg cap capsule     calcium-cholecalciferol, d3, (CALCIUM 600 + D) 600-125 mg-unit tab Take 1 Tab by mouth two (2) times a day. No current facility-administered medications for this visit. Past Medical History - reviewed:  Past Medical History:   Diagnosis Date    COPD (chronic obstructive pulmonary disease) ([facility] [address] 1/7/2016    Coronary atherosclerosis of native coronary artery 2006    MI/Left ventricular wall motion is segmentally abnormal with mild hypokinesis of the anterior wall. Ejection Fraction is estimated at 50%. LAD >90% stenosis. LCX 50% stenosis.      Elevated BP 2/1/2017    Family history of suicide     father    GERD (gastroesophageal reflux disease)     Glaucoma     Mixed hyperlipidemia     Osteopenia 2015    DEXA ,     S/P angioplasty with stent     stent to LAD. 3X8mm Cypher. Past Surgical History - reviewed:   Past Surgical History:   Procedure Laterality Date    HX  SECTION  1    HX COLONOSCOPY  2013    reportedly normal    HX ENDOSCOPY      reportedly normal    HX HEART CATHETERIZATION  2006    Left ventricular wall motion is segmentally abnormal with mild hypokinesis of the anterior wall. Ejection Fraction is estimated at 50%. LAD >90% stenosis. LCX 50% stenosis.  HX HEART CATHETERIZATION  2014    2 stents placed    HX ORTHOPAEDIC  8/10/15    hip replacement    HX PTCA  2006    stent to LAD. 3X8mm Cypher.  [address] [place] [address] BREAST BIOPSY Left 2010         Social History - reviewed:  Social History     Social History    Marital status:      Spouse name: N/A    Number of children: N/A    Years of education: N/A     Occupational History    Not on file.      Social History Main Topics    Smoking status: Never Smoker    Smokeless tobacco: Never Used    Alcohol use No    Drug use: No    Sexual activity: No     Other Topics Concern    Not on file     Social History Narrative         Family History - reviewed:  Family History   Problem Relation Age of Onset    Suicide Father     Heart Disease Sister       61    Cancer Brother      bladder cancer         Immunizations - reviewed:   Immunization History   Administered Date(s) Administered    Influenza Vaccine 10/14/2015       ROS  CONSTITUTIONAL: Denies: fever  GI: Denies: abdominal pain  : dysuria, frequency/urgency, hematuria      Physical Exam  Visit Vitals    /67 (BP 1 Location: Left arm, BP Patient Position: Sitting)    Pulse 70    Temp 98.9 °F (37.2 °C) (Oral)    Resp 16    Ht 5' 5\" (1.651 m)    Wt 127 lb (57.6 kg)    SpO2 100%    BMI 21.13 kg/m2       General appearance - alert, well appearing, and in no distress  Eyes - EOMI  Mouth - mucous membranes moist, pharynx normal without lesions  Chest - clear to auscultation, no wheezes, rales or rhonchi, symmetric air entry  Heart - normal rate, regular rhythm, normal S1, S2, no murmurs, rubs, clicks or gallops  Abdomen - soft, mild TTP without rebound or guarding over suprapubis  Neurological - alert, oriented, normal speech, no focal findings or movement disorder noted  Musculoskeletal - no joint tenderness, deformity or swelling, no CVA tenderness  Extremities - peripheral pulses normal, no pedal edema, no clubbing or cyanosis  Skin - normal coloration and turgor, no rashes, no suspicious skin lesions noted    Recent Results (from the past 24 hour(s))   AMB POC URINALYSIS DIP STICK AUTO W/O MICRO    Collection Time: 10/11/17  6:39 PM   Result Value Ref Range    Color (UA POC) Yellow     Clarity (UA POC) Clear     Glucose (UA POC) Negative Negative    Bilirubin (UA POC) Negative Negative    Ketones (UA POC) Negative Negative    Specific gravity (UA POC) 1.015 1.001 - 1.035    Blood (UA POC) 3+ Negative    pH (UA POC) 6.0 4.6 - 8.0    Protein (UA POC) 1+ Negative mg/dL    Urobilinogen (UA POC) 0.2 mg/dL 0.2 - 1    Nitrites (UA POC) Negative Negative    Leukocyte esterase (UA POC) 3+ Negative       Assessment/Plan    ICD-10-CM ICD-9-CM    1. Dysuria R30.0 788.1 AMB POC URINALYSIS DIP STICK AUTO W/O MICRO      CULTURE, URINE      cephALEXin (KEFLEX) 500 mg capsule     UA concerning for UTI. Will send for urine culture and treat empirically with keflex. Follow-up Disposition:  Return if symptoms worsen or fail to improve. I have discussed the diagnosis with the patient and the intended plan as seen in the above orders. The patient has received an after-visit summary and questions were answered concerning future plans. I have discussed medication side effects and warnings with the patient as well.       Laurel Patton MD  Family Medicine Resident    Patient discussed with Dr. Daniella Magallanes, attending physician.

## 2017-10-11 NOTE — PATIENT INSTRUCTIONS
Painful Urination (Dysuria): Care Instructions  Your Care Instructions  Burning pain with urination (dysuria) is a common symptom of a urinary tract infection or other urinary problems. The bladder may become inflamed. This can cause pain when the bladder fills and empties. You may also feel pain if the tube that carries urine from the bladder to the outside of the body (urethra) gets irritated or infected. Sexually transmitted infections (STIs) also may cause pain when you urinate. Sometimes the pain can be caused by things other than an infection. The urethra can be irritated by soaps, perfumes, or foreign objects in the urethra. Kidney stones can cause pain when they pass through the urethra. The cause may be hard to find. You may need tests. Treatment for painful urination depends on the cause. Follow-up care is a key part of your treatment and safety. Be sure to make and go to all appointments, and call your doctor if you are having problems. It's also a good idea to know your test results and keep a list of the medicines you take. How can you care for yourself at home? · Drink extra water for the next day or two. This will help make the urine less concentrated. (If you have kidney, heart, or liver disease and have to limit fluids, talk with your doctor before you increase the amount of fluids you drink.)  · Avoid drinks that are carbonated or have caffeine. They can irritate the bladder. · Urinate often. Try to empty your bladder each time. For women:  · Urinate right after you have sex. · After going to the bathroom, wipe from front to back. · Avoid douches, bubble baths, and feminine hygiene sprays. And avoid other feminine hygiene products that have deodorants. When should you call for help? Call your doctor now or seek immediate medical care if:  · You have new symptoms, such as fever, nausea, or vomiting. · You have new or worse symptoms of a urinary problem.  For example:  ¨ You have blood or pus in your urine. ¨ You have chills or body aches. ¨ It hurts worse to urinate. ¨ You have groin or belly pain. ¨ You have pain in your back just below your rib cage (the flank area). Watch closely for changes in your health, and be sure to contact your doctor if you have any problems. Where can you learn more? Go to http://tanya-mk.info/. Enter Q255 in the search box to learn more about \"Painful Urination (Dysuria): Care Instructions. \"  Current as of: November 28, 2016  Content Version: 11.3  © 8519-0442 [employer]adex. Care instructions adapted under license by "[employer]" (which disclaims liability or warranty for this information). If you have questions about a medical condition or this instruction, always ask your healthcare professional. [address] any warranty or liability for your use of this information.

## 2017-10-14 LAB — BACTERIA UR CULT: ABNORMAL

## 2017-11-01 ENCOUNTER — OFFICE VISIT (OUTPATIENT)
Dept: CARDIOLOGY CLINIC | Age: 72
End: 2017-11-01

## 2017-11-01 VITALS
HEART RATE: 64 BPM | OXYGEN SATURATION: 98 % | DIASTOLIC BLOOD PRESSURE: 70 MMHG | BODY MASS INDEX: 21.46 KG/M2 | WEIGHT: 128.8 LBS | SYSTOLIC BLOOD PRESSURE: 130 MMHG | HEIGHT: 65 IN

## 2017-11-01 DIAGNOSIS — I25.10 CORONARY ARTERY DISEASE INVOLVING NATIVE CORONARY ARTERY OF NATIVE HEART WITHOUT ANGINA PECTORIS: ICD-10-CM

## 2017-11-01 DIAGNOSIS — J44.9 CHRONIC OBSTRUCTIVE PULMONARY DISEASE, UNSPECIFIED COPD TYPE (HCC): ICD-10-CM

## 2017-11-01 DIAGNOSIS — I10 HYPERTENSION, ESSENTIAL: Primary | ICD-10-CM

## 2017-11-01 DIAGNOSIS — E78.2 MIXED HYPERLIPIDEMIA: ICD-10-CM

## 2017-11-01 NOTE — MR AVS SNAPSHOT
Visit Information Date & Time Provider Department Dept. Phone Encounter #  
 11/1/2017 11:20 AM Karla Cross MD [facility] [phone] [id number] Your Appointments 11/1/2017 11:20 AM  
ESTABLISHED PATIENT with Karla Cross MD  
[facility] ([facility] SCHEDULING) Appt Note: 2 mo fup  
 [address] [facility] [address] [address] [address] Upcoming Health Maintenance Date Due DTaP/Tdap/Td series (1 - Tdap) 10/31/1964 FOBT Q 1 YEAR AGE 50-75 10/31/1993 ZOSTER VACCINE AGE 60> 8/31/2003 GLAUCOMA SCREENING Q2Y 10/31/2008 Pneumococcal 65+ High/Highest Risk (1 of 2 - PCV13) 10/31/2008 MEDICARE YEARLY EXAM 12/18/2016 INFLUENZA AGE 9 TO ADULT 8/1/2017 BREAST CANCER SCRN MAMMOGRAM 8/30/2019 Allergies as of 11/1/2017  Review Complete On: 10/11/2017 By: Marychuy Dejesus MD  
  
 Severity Noted Reaction Type Reactions Lamictal [Lamotrigine] High 03/08/2017    Anaphylaxis Current Immunizations  Never Reviewed Name Date Influenza Vaccine 10/14/2015 Not reviewed this visit Vitals OB Status Smoking Status Postmenopausal Never Smoker Your Updated Medication List  
  
   
This list is accurate as of: 10/31/17 10:16 AM.  Always use your most recent med list.  
  
  
  
  
 aspirin, buffered 81 mg Tab Take  by mouth. CALCIUM 600 + D 600-125 mg-unit Tab Generic drug:  calcium-cholecalciferol (d3) Take 1 Tab by mouth two (2) times a day. clopidogrel 75 mg Tab Commonly known as:  PLAVIX TAKE 1 TABLET EVERY DAY  
  
 gabapentin 300 mg capsule Commonly known as:  NEURONTIN Take 300 mg by mouth three (3) times daily. latanoprost 0.005 % ophthalmic solution Commonly known as:  Lynda Dempsey Administer 1 Drop to both eyes nightly. LEXAPRO 20 mg tablet Generic drug:  escitalopram oxalate Take 20 mg by mouth daily. lisinopril 5 mg tablet Commonly known as:  Michaelyn Alleyton Take 1 Tab by mouth daily. melatonin 5 mg Cap capsule  
  
 metoprolol tartrate 25 mg tablet Commonly known as:  LOPRESSOR  
TAKE 1 TABLET TWICE DAILY  
  
 simvastatin 40 mg tablet Commonly known as:  ZOCOR  
TAKE 1 TABLET EVERY NIGHT  
  
 triamcinolone 0.1 % lotion Commonly known as:  KENALOG Apply  to affected area as needed. use thin layer VENTOLIN HFA 90 mcg/actuation inhaler Generic drug:  albuterol Introducing [facility]! Adrianna Rosas introduces Days [employer] Wonder patient portal. Now you can access parts of your medical record, email your doctor's office, and request medication refills online. 1. In your internet browser, go to [email or web address] 2. Click on the First Time User? Click Here link in the Sign In box. You will see the New Member Sign Up page. 3. Enter your Days [employer] Wonder Access Code exactly as it appears below. You will not need to use this code after youve completed the sign-up process. If you do not sign up before the expiration date, you must request a new code. · [employer] Wonder Access Code: [email or web address] Expires: 1/29/2018 10:16 AM 
 
4. Enter the last four digits of your Social Security Number (xxxx) and Date of Birth (mm/dd/yyyy) as indicated and click Submit. You will be taken to the next sign-up page. 5. Create a QA on Requestt ID. This will be your Days of Wonder login ID and cannot be changed, so think of one that is secure and easy to remember. 6. Create a Days of Wonder password. You can change your password at any time. 7. Enter your Password Reset Question and Answer. This can be used at a later time if you forget your password. 8. Enter your e-mail address. You will receive e-mail notification when new information is available in [address] 9. Click Sign Up. You can now view and download portions of your medical record. 10. Click the Download Summary menu link to download a portable copy of your medical information. If you have questions, please visit the Frequently Asked Questions section of the JLGOV website. Remember, JLGOV is NOT to be used for urgent needs. For medical emergencies, dial 911. Now available from your iPhone and Android! Please provide this summary of care documentation to your next provider. Your primary care clinician is listed as Merly Bolanos. If you have any questions after today's visit, please call [phone].

## 2017-11-01 NOTE — PROGRESS NOTES
Drake Jackson     1945       Ofelia Mi MD, [facility]  Date of Visit-11/1/2017   PCP is Getachew Casillas MD   [facility]  [facility]  HPI:  Drake Jackson is a 74 y.o. female   F/u CAD and elevated BP, had a nuclear in 2016 with no ischemia we added lisinopril 5 mg to start after her rash resolved. She now reports palpitations and shortness of breath. Saw her PCP on 10/11/17 with UTI    The pt reports that she is doing well on the lisinopril. Her UTI has now resolved. The pt reports that her SOB is baseline and has not worsened. Denies chest pain, edema, syncope or shortness of breath at rest, has no tachycardia, palpitations or sense of arrhythmia. Assessment/Plan:     1. HTN, improved with the addition of lisinopril and rash resolved   2. CAD no angina continue DACT, BB, and ASA. 3. Dyslipidemia on statin stable  4. COPD mild   5. Follow up in 6 months       Impression:   1. Hypertension, essential    2. Coronary artery disease involving native coronary artery of native heart without angina pectoris    3. Mixed hyperlipidemia    4. Chronic obstructive pulmonary disease, unspecified COPD type ([facility]       Cardiac History:   NUKE 3-1-13= walked 5 minutes, STT 3 mm horizontal, normal perfusion, ef 70%  ECHO 3-1-13= 55-60%, mild MR,AR,TR, Aortic sclerosis without stenosis  Cardiac Cath 1-8-14-Successful GORDON pLAD (2.75x18 Xience). RFA mynx, RFV manual.     ROS-except as noted above. . A complete cardiac and respiratory are reviewed and negative except as above ; Resp-denies wheezing  or productive cough,.  Const- No unusual weight loss or fever; Neuro-no recent seizure or CVA ; GI- No BRBPR, abdom pain, bloating ; - no  hematuria   see supplement sheet, initialed and to be scanned by staff  Past Medical History:   Diagnosis Date    COPD (chronic obstructive pulmonary disease) ([facility] 1/7/2016    Coronary atherosclerosis of native coronary artery 2006    MI/Left ventricular wall motion is segmentally abnormal with mild hypokinesis of the anterior wall. Ejection Fraction is estimated at 50%. LAD >90% stenosis. LCX 50% stenosis.  Elevated BP 2/1/2017    Family history of suicide     father    GERD (gastroesophageal reflux disease)     Glaucoma     Mixed hyperlipidemia     Osteopenia 2015    DEXA 2010, 2015    S/P angioplasty with stent     stent to LAD. 3X8mm Cypher. Social Hx= reports that she has never smoked. She has never used smokeless tobacco. She reports that she does not drink alcohol or use illicit drugs. Exam and Labs:  Ht 5' 5\" (1.651 m)Constitutional:  NAD, comfortable  Head: NC,AT. Eyes: No scleral icterus. Neck:  Neck supple. No JVD present. Throat: moist mucous membranes. Chest: Effort normal & normal respiratory excursion . Neurological: alert, conversant and oriented . Skin: Skin is not cold. No obvious systemic rash noted. Not diaphoretic. No erythema. Psychiatric:  Grossly normal mood and affect. Behavior appears normal. Extremities:  no clubbing or cyanosis. Abdomen: non distended    Lungs:breath sounds normal. No stridor. distress, wheezes or  Rales. Heart: normal rate, regular rhythm, normal S1, S2, no murmurs, rubs, clicks or gallops , PMI non displaced. Edema: Edema is none.   Lab Results   Component Value Date/Time    Cholesterol, total 141 07/13/2015 09:17 AM    HDL Cholesterol 38 07/13/2015 09:17 AM    LDL, calculated 82 07/13/2015 09:17 AM    Triglyceride 104 07/13/2015 09:17 AM    CHOL/HDL Ratio 3.8 01/09/2014 04:20 AM     Lab Results   Component Value Date/Time    Sodium 141 05/09/2017 04:12 PM    Potassium 4.6 05/09/2017 04:12 PM    Chloride 99 05/09/2017 04:12 PM    CO2 27 05/09/2017 04:12 PM    Anion gap 7 03/09/2017 12:13 AM    Glucose 100 05/09/2017 04:12 PM    BUN 15 05/09/2017 04:12 PM    Creatinine 0.79 05/09/2017 04:12 PM    BUN/Creatinine ratio 19 05/09/2017 04:12 PM    GFR est AA 86 05/09/2017 04:12 PM    GFR est non-AA 74 05/09/2017 04:12 PM    Calcium 9.4 05/09/2017 04:12 PM      Wt Readings from Last 3 Encounters:   10/11/17 127 lb (57.6 kg)   08/30/17 126 lb 3.2 oz (57.2 kg)   08/14/17 125 lb (56.7 kg)      BP Readings from Last 3 Encounters:   10/11/17 150/67   08/30/17 154/56   08/14/17 176/80      Current Outpatient Prescriptions   Medication Sig    simvastatin (ZOCOR) 40 mg tablet TAKE 1 TABLET EVERY NIGHT    gabapentin (NEURONTIN) 300 mg capsule Take 300 mg by mouth three (3) times daily.  triamcinolone (KENALOG) 0.1 % lotion Apply  to affected area as needed. use thin layer    lisinopril (PRINIVIL, ZESTRIL) 5 mg tablet Take 1 Tab by mouth daily.  metoprolol tartrate (LOPRESSOR) 25 mg tablet TAKE 1 TABLET TWICE DAILY    clopidogrel (PLAVIX) 75 mg tab TAKE 1 TABLET EVERY DAY    VENTOLIN HFA 90 mcg/actuation inhaler     melatonin 5 mg cap capsule     escitalopram oxalate (LEXAPRO) 20 mg tablet Take 20 mg by mouth daily.  Aspirin, Buffered 81 mg tab Take  by mouth.  calcium-cholecalciferol, d3, (CALCIUM 600 + D) 600-125 mg-unit tab Take 1 Tab by mouth two (2) times a day.  latanoprost (XALATAN) 0.005 % ophthalmic solution Administer 1 Drop to both eyes nightly. No current facility-administered medications for this visit. Impression see above.       Written by Jadyn Arrieta, as dictated by Kathleen Guillaume MD.

## 2017-12-13 ENCOUNTER — OFFICE VISIT (OUTPATIENT)
Dept: FAMILY MEDICINE CLINIC | Age: 72
End: 2017-12-13

## 2017-12-13 VITALS
TEMPERATURE: 98.2 F | DIASTOLIC BLOOD PRESSURE: 62 MMHG | OXYGEN SATURATION: 99 % | RESPIRATION RATE: 20 BRPM | SYSTOLIC BLOOD PRESSURE: 146 MMHG | HEIGHT: 65 IN | WEIGHT: 128.2 LBS | HEART RATE: 74 BPM | BODY MASS INDEX: 21.36 KG/M2

## 2017-12-13 DIAGNOSIS — J01.10 ACUTE NON-RECURRENT FRONTAL SINUSITIS: Primary | ICD-10-CM

## 2017-12-13 RX ORDER — AMOXICILLIN AND CLAVULANATE POTASSIUM 875; 125 MG/1; MG/1
1 TABLET, FILM COATED ORAL 2 TIMES DAILY
Qty: 20 TAB | Refills: 0 | Status: SHIPPED | OUTPATIENT
Start: 2017-12-13 | End: 2017-12-23

## 2017-12-13 NOTE — Clinical Note
Shaneka Ball,  This lady has not had a annual wellness visit in a while. Her last medicare wellness was in 2015. She told me it was done within the year; however, when I checked later, it has actually been a couple of years. Figured it would be helpful to have someone reach out to her. Thanks!  TOÑA

## 2017-12-13 NOTE — PROGRESS NOTES
History of Present Illness:     Chief Complaint   Patient presents with    Sore Throat     x a week and half, clear mucus    Sinus Infection     Pt is a 74y.o. year old female    Presents to clinic for congestion and sore throat for 1 week or more. Reports clear nasal drainage. She is having headache over her eyes. Has a hx of sinus infections and has had 2 sinus surgeries, but those haven't helped. Taking OTC mucus medicine and cough syrup and Tylenol. ROS:  Denies fever, chills, sweats  Denies chest pain, shortness of breath      Past Medical History:   Diagnosis Date    COPD (chronic obstructive pulmonary disease) ([facility] [address] 1/7/2016    Coronary atherosclerosis of native coronary artery 2006    MI/Left ventricular wall motion is segmentally abnormal with mild hypokinesis of the anterior wall. Ejection Fraction is estimated at 50%. LAD >90% stenosis. LCX 50% stenosis.  Elevated BP 2/1/2017    Family history of suicide     father    GERD (gastroesophageal reflux disease)     Glaucoma     Hypertension, essential 11/1/2017    Mixed hyperlipidemia     Osteopenia 2015    DEXA 2010, 2015    S/P angioplasty with stent     stent to LAD. 3X8mm Cypher. Current Outpatient Prescriptions on File Prior to Visit   Medication Sig Dispense Refill    simvastatin (ZOCOR) 40 mg tablet TAKE 1 TABLET EVERY NIGHT 90 Tab 2    lisinopril (PRINIVIL, ZESTRIL) 5 mg tablet Take 1 Tab by mouth daily. 90 Tab 3    metoprolol tartrate (LOPRESSOR) 25 mg tablet TAKE 1 TABLET TWICE DAILY 180 Tab 1    clopidogrel (PLAVIX) 75 mg tab TAKE 1 TABLET EVERY DAY 90 Tab 1    escitalopram oxalate (LEXAPRO) 20 mg tablet Take 20 mg by mouth daily.  Aspirin, Buffered 81 mg tab Take  by mouth.  calcium-cholecalciferol, d3, (CALCIUM 600 + D) 600-125 mg-unit tab Take 1 Tab by mouth two (2) times a day.  latanoprost (XALATAN) 0.005 % ophthalmic solution Administer 1 Drop to both eyes nightly.       gabapentin (NEURONTIN) 300 mg capsule Take 300 mg by mouth three (3) times daily.  triamcinolone (KENALOG) 0.1 % lotion Apply  to affected area as needed. use thin layer      VENTOLIN HFA 90 mcg/actuation inhaler       melatonin 5 mg cap capsule        No current facility-administered medications on file prior to visit. Allergies: Allergies   Allergen Reactions    Lamictal [Lamotrigine] Anaphylaxis       Objective:     Vitals:    12/13/17 1502   BP: 146/62   Pulse: 74   Resp: 20   Temp: 98.2 °F (36.8 °C)   TempSrc: Oral   SpO2: 99%   Weight: 128 lb 3.2 oz (58.2 kg)   Height: 5' 5\" (1.651 m)       Physical Exam:  General appearance - alert, well appearing, and in no distress  Ears - bilateral TM's and external ear canals normal  Nose - mucosal congestion, mucosal erythema and sinus tenderness noted over eyes and cheeks  Mouth - mucous membranes moist, pharynx normal without lesions  Neck - supple, no significant adenopathy  Chest - clear to auscultation, no wheezes, rales or rhonchi, symmetric air entry  Heart - normal rate, regular rhythm, normal S1, S2, no murmurs, rubs, clicks or gallops      Recent Labs:  No results found for this or any previous visit (from the past 12 hour(s)). Assessment and Plan:   Pt is a 74y.o. year old female,      ICD-10-CM ICD-9-CM    1. Acute non-recurrent frontal sinusitis J01.10 461.1 amoxicillin-clavulanate (AUGMENTIN) 875-125 mg per tablet     Continue supportive home care as well  RTC if no improvement in symptoms over the next 5-7 days    Celi Coelho MD      I have discussed the diagnosis with the patient and the intended plan as seen in the above orders. The patient has received an after-visit summary and questions were answered concerning future plans.

## 2017-12-13 NOTE — MR AVS SNAPSHOT
Visit Information Date & Time Provider Department Dept. Phone Encounter #  
 12/13/2017  3:00 PM Memo Iyer, [address] [facility] [phone] [id number] Follow-up Instructions Return if symptoms worsen or fail to improve. Your Appointments 5/16/2018 11:40 AM  
ESTABLISHED PATIENT with Kirstie Ortiz MD  
[facility] ([address]) Appt Note: 6 mo fu  
 [address] [facility] [address] [facility]  
54 [facility] [address] [address] Upcoming Health Maintenance Date Due DTaP/Tdap/Td series (1 - Tdap) 10/31/1964 FOBT Q 1 YEAR AGE 50-75 10/31/1993 ZOSTER VACCINE AGE 60> 8/31/2003 GLAUCOMA SCREENING Q2Y 10/31/2008 Pneumococcal 65+ High/Highest Risk (1 of 2 - PCV13) 10/31/2008 MEDICARE YEARLY EXAM 12/18/2016 Influenza Age 5 to Adult 8/1/2017 Allergies as of 12/13/2017  Review Complete On: 12/13/2017 By: Blue Connors Severity Noted Reaction Type Reactions Lamictal [Lamotrigine] High 03/08/2017    Anaphylaxis Current Immunizations  Never Reviewed Name Date Influenza Vaccine 10/14/2015 Not reviewed this visit You Were Diagnosed With   
  
 Codes Comments Acute non-recurrent frontal sinusitis    -  Primary ICD-10-CM: J01.10 ICD-9-CM: 978.1 Vitals BP Pulse Temp Resp Height(growth percentile) Weight(growth percentile) 146/62 (BP 1 Location: Right arm, BP Patient Position: Sitting) 74 98.2 °F (36.8 °C) (Oral) 20 5' 5\" (1.651 m) 128 lb 3.2 oz (58.2 kg) SpO2 BMI OB Status Smoking Status 99% 21.33 kg/m2 Postmenopausal Never Smoker Vitals History BMI and BSA Data Body Mass Index Body Surface Area  
 21.33 kg/m 2 1.63 m 2 Preferred Pharmacy Pharmacy Name Phone [facility] [address] [facility] [phone] Your Updated Medication List  
  
   
This list is accurate as of: 12/13/17  3:26 PM.  Always use your most recent med list.  
  
  
  
  
 amoxicillin-clavulanate 875-125 mg per tablet Commonly known as:  AUGMENTIN Take 1 Tab by mouth two (2) times a day for 10 days. aspirin, buffered 81 mg Tab Take  by mouth. CALCIUM 600 + D 600-125 mg-unit Tab Generic drug:  calcium-cholecalciferol (d3) Take 1 Tab by mouth two (2) times a day. clopidogrel 75 mg Tab Commonly known as:  PLAVIX TAKE 1 TABLET EVERY DAY  
  
 gabapentin 300 mg capsule Commonly known as:  NEURONTIN Take 300 mg by mouth three (3) times daily. latanoprost 0.005 % ophthalmic solution Commonly known as:  Kenith Standing Administer 1 Drop to both eyes nightly. LEXAPRO 20 mg tablet Generic drug:  escitalopram oxalate Take 20 mg by mouth daily. lisinopril 5 mg tablet Commonly known as:  [place] Take 1 Tab by mouth daily. melatonin 5 mg Cap capsule  
  
 metoprolol tartrate 25 mg tablet Commonly known as:  LOPRESSOR  
TAKE 1 TABLET TWICE DAILY  
  
 simvastatin 40 mg tablet Commonly known as:  ZOCOR  
TAKE 1 TABLET EVERY NIGHT  
  
 triamcinolone 0.1 % lotion Commonly known as:  KENALOG Apply  to affected area as needed. use thin layer VENTOLIN HFA 90 mcg/actuation inhaler Generic drug:  albuterol Prescriptions Sent to Pharmacy Refills  
 amoxicillin-clavulanate (AUGMENTIN) 875-125 mg per tablet 0 Sig: Take 1 Tab by mouth two (2) times a day for 10 days. Class: Normal  
 Pharmacy: [id number] [address] [place] [address] #: [phone] Route: Oral  
  
Follow-up Instructions Return if symptoms worsen or fail to improve. Patient Instructions Sinusitis: Care Instructions Your Care Instructions Sinusitis is an infection of the lining of the sinus cavities in your head. Sinusitis often follows a cold. It causes pain and pressure in your head and face. In most cases, sinusitis gets better on its own in 1 to 2 weeks. But some mild symptoms may last for several weeks. Sometimes antibiotics are needed. Follow-up care is a key part of your treatment and safety. Be sure to make and go to all appointments, and call your doctor if you are having problems. It's also a good idea to know your test results and keep a list of the medicines you take. How can you care for yourself at home? · Take an over-the-counter pain medicine, such as acetaminophen (Tylenol), ibuprofen (Advil, Motrin), or naproxen (Aleve). Read and follow all instructions on the label. · If the doctor prescribed antibiotics, take them as directed. Do not stop taking them just because you feel better. You need to take the full course of antibiotics. · Be careful when taking over-the-counter cold or flu medicines and Tylenol at the same time. Many of these medicines have acetaminophen, which is Tylenol. Read the labels to make sure that you are not taking more than the recommended dose. Too much acetaminophen (Tylenol) can be harmful. · Breathe warm, moist air from a steamy shower, a hot bath, or a sink filled with hot water. Avoid cold, dry air. Using a humidifier in your home may help. Follow the directions for cleaning the machine. · Use saline (saltwater) nasal washes to help keep your nasal passages open and wash out mucus and bacteria. You can buy saline nose drops at a grocery store or drugstore. Or you can make your own at home by adding 1 teaspoon of salt and 1 teaspoon of baking soda to 2 cups of distilled water. If you make your own, fill a bulb syringe with the solution, insert the tip into your nostril, and squeeze gently. Thena Number your nose. · Put a hot, wet towel or a warm gel pack on your face 3 or 4 times a day for 5 to 10 minutes each time. · Try a decongestant nasal spray like oxymetazoline (Afrin). Do not use it for more than 3 days in a row. Using it for more than 3 days can make your congestion worse. When should you call for help? Call your doctor now or seek immediate medical care if: 
? · You have new or worse swelling or redness in your face or around your eyes. ? · You have a new or higher fever. ? Watch closely for changes in your health, and be sure to contact your doctor if: 
? · You have new or worse facial pain. ? · The mucus from your nose becomes thicker (like pus) or has new blood in it. ? · You are not getting better as expected. Where can you learn more? Go to http://tanya-mk.info/. Enter Z172 in the search box to learn more about \"Sinusitis: Care Instructions. \" Current as of: May 12, 2017 Content Version: 11.4 © 7968-5185 [employer]. Care instructions adapted under license by [employer] (which disclaims liability or warranty for this information). If you have questions about a medical condition or this instruction, always ask your healthcare professional. [place] [address] any warranty or liability for your use of this information. Introducing [facility] & HEALTH SERVICES! Maegan Pacheco introduces [employer] patient portal. Now you can access parts of your medical record, email your doctor's office, and request medication refills online. 1. In your internet browser, go to [email or web address] 2. Click on the First Time User? Click Here link in the Sign In box. You will see the New Member Sign Up page. 3. Enter your [employer] Access Code exactly as it appears below. You will not need to use this code after youve completed the sign-up process. If you do not sign up before the expiration date, you must request a new code. · [employer] Access Code: [email or web address] Expires: 1/29/2018  9:16 AM 
 
 4. Enter the last four digits of your Social Security Number (xxxx) and Date of Birth (mm/dd/yyyy) as indicated and click Submit. You will be taken to the next sign-up page. 5. Create a BitX ID. This will be your BitX login ID and cannot be changed, so think of one that is secure and easy to remember. 6. Create a BitX password. You can change your password at any time. 7. Enter your Password Reset Question and Answer. This can be used at a later time if you forget your password. 8. Enter your e-mail address. You will receive e-mail notification when new information is available in [address] 9. Click Sign Up. You can now view and download portions of your medical record. 10. Click the Download Summary menu link to download a portable copy of your medical information. If you have questions, please visit the Frequently Asked Questions section of the Bit[employer] website. Remember, Bit[employer] is NOT to be used for urgent needs. For medical emergencies, dial 911. Now available from your iPhone and Android! Please provide this summary of care documentation to your next provider. Your primary care clinician is listed as Corinne Miller. If you have any questions after today's visit, please call [phone].

## 2017-12-13 NOTE — PATIENT INSTRUCTIONS
Sinusitis: Care Instructions  Your Care Instructions    Sinusitis is an infection of the lining of the sinus cavities in your head. Sinusitis often follows a cold. It causes pain and pressure in your head and face. In most cases, sinusitis gets better on its own in 1 to 2 weeks. But some mild symptoms may last for several weeks. Sometimes antibiotics are needed. Follow-up care is a key part of your treatment and safety. Be sure to make and go to all appointments, and call your doctor if you are having problems. It's also a good idea to know your test results and keep a list of the medicines you take. How can you care for yourself at home? · Take an over-the-counter pain medicine, such as acetaminophen (Tylenol), ibuprofen (Advil, Motrin), or naproxen (Aleve). Read and follow all instructions on the label. · If the doctor prescribed antibiotics, take them as directed. Do not stop taking them just because you feel better. You need to take the full course of antibiotics. · Be careful when taking over-the-counter cold or flu medicines and Tylenol at the same time. Many of these medicines have acetaminophen, which is Tylenol. Read the labels to make sure that you are not taking more than the recommended dose. Too much acetaminophen (Tylenol) can be harmful. · Breathe warm, moist air from a steamy shower, a hot bath, or a sink filled with hot water. Avoid cold, dry air. Using a humidifier in your home may help. Follow the directions for cleaning the machine. · Use saline (saltwater) nasal washes to help keep your nasal passages open and wash out mucus and bacteria. You can buy saline nose drops at a grocery store or drugstore. Or you can make your own at home by adding 1 teaspoon of salt and 1 teaspoon of baking soda to 2 cups of distilled water. If you make your own, fill a bulb syringe with the solution, insert the tip into your nostril, and squeeze gently. Marty Lien your nose.   · Put a hot, wet towel or a warm gel pack on your face 3 or 4 times a day for 5 to 10 minutes each time. · Try a decongestant nasal spray like oxymetazoline (Afrin). Do not use it for more than 3 days in a row. Using it for more than 3 days can make your congestion worse. When should you call for help? Call your doctor now or seek immediate medical care if:  ? · You have new or worse swelling or redness in your face or around your eyes. ? · You have a new or higher fever. ? Watch closely for changes in your health, and be sure to contact your doctor if:  ? · You have new or worse facial pain. ? · The mucus from your nose becomes thicker (like pus) or has new blood in it. ? · You are not getting better as expected. Where can you learn more? Go to http://tanya-mk.info/. Enter B277 in the search box to learn more about \"Sinusitis: Care Instructions. \"  Current as of: May 12, 2017  Content Version: 11.4  © 0331-0789 Healthwise[employer] Incorporated. Care instructions adapted under license by [employer] (which disclaims liability or warranty for this information). If you have questions about a medical condition or this instruction, always ask your healthcare professional. [address] any warranty or liability for your use of this information.

## 2017-12-13 NOTE — PROGRESS NOTES
Chief Complaint   Patient presents with    Sore Throat     x a week and half, clear mucus    Sinus Infection     1. Have you been to the ER, urgent care clinic since your last visit? Hospitalized since your last visit? No    2. Have you seen or consulted any other health care providers outside of the [address] [place] [address] since your last visit? Include any pap smears or colon screening.  Yes When: December 2017 Where: [facility] Reason for visit: Flu shot

## 2017-12-26 DIAGNOSIS — E78.2 MIXED HYPERLIPIDEMIA: ICD-10-CM

## 2017-12-28 RX ORDER — CLOPIDOGREL BISULFATE 75 MG/1
TABLET ORAL
Qty: 90 TAB | Refills: 1 | Status: SHIPPED | OUTPATIENT
Start: 2017-12-28 | End: 2018-07-11 | Stop reason: SDUPTHER

## 2017-12-28 RX ORDER — METOPROLOL TARTRATE 25 MG/1
TABLET, FILM COATED ORAL
Qty: 180 TAB | Refills: 1 | Status: SHIPPED | OUTPATIENT
Start: 2017-12-28 | End: 2018-07-11 | Stop reason: SDUPTHER

## 2017-12-28 NOTE — TELEPHONE ENCOUNTER
Request for metoprolol tartrate 25 mg, take 1 tablet twice daily  plavix 75 mg, daily. Last office visit 11/1/17,   Next office visit 5/16/18 with Dr. Ion Vargas.      Refills per verbal order from Dr. Cherelle Goldstein.

## 2018-01-23 ENCOUNTER — OFFICE VISIT (OUTPATIENT)
Dept: NEUROLOGY | Age: 73
End: 2018-01-23

## 2018-01-23 VITALS
SYSTOLIC BLOOD PRESSURE: 122 MMHG | OXYGEN SATURATION: 96 % | BODY MASS INDEX: 21.16 KG/M2 | WEIGHT: 127 LBS | TEMPERATURE: 98.2 F | RESPIRATION RATE: 18 BRPM | DIASTOLIC BLOOD PRESSURE: 66 MMHG | HEIGHT: 65 IN | HEART RATE: 61 BPM

## 2018-01-23 DIAGNOSIS — L30.9 DERMATITIS: Primary | ICD-10-CM

## 2018-01-23 RX ORDER — LISINOPRIL 5 MG/1
TABLET ORAL DAILY
COMMUNITY
End: 2018-06-06

## 2018-01-23 RX ORDER — LATANOPROST 50 UG/ML
1 SOLUTION/ DROPS OPHTHALMIC
COMMUNITY
End: 2018-06-06 | Stop reason: SDUPTHER

## 2018-01-23 RX ORDER — GUAIFENESIN 100 MG/5ML
81 LIQUID (ML) ORAL DAILY
COMMUNITY
End: 2018-06-06 | Stop reason: SDUPTHER

## 2018-01-23 RX ORDER — ESCITALOPRAM OXALATE 20 MG/1
20 TABLET ORAL DAILY
COMMUNITY
End: 2018-06-06

## 2018-01-23 RX ORDER — CLOPIDOGREL BISULFATE 75 MG/1
TABLET ORAL
COMMUNITY
End: 2018-06-06 | Stop reason: SDUPTHER

## 2018-01-23 RX ORDER — SIMVASTATIN 40 MG/1
TABLET, FILM COATED ORAL
COMMUNITY
End: 2018-06-06 | Stop reason: SDUPTHER

## 2018-01-23 RX ORDER — METOPROLOL TARTRATE 25 MG/1
TABLET, FILM COATED ORAL 2 TIMES DAILY
COMMUNITY
End: 2018-06-06 | Stop reason: SDUPTHER

## 2018-01-23 NOTE — PROGRESS NOTES
Neurology Consult      Subjective:      Maria Esther Be is a 74 y.o. female who comes in on this encounter with a history of neurologic evaluation of persistent dermatitis in her left occipital nuchal area. This apparently started about a year ago after she had an exposure to Lamictal of which she has been deemed allergic. Unfortunately the itching and the rash had persisted and is under the watchful eye of the dermatologist.  As far she is concerned has not made complete progress and came here with the idea perhaps a new a better cause-and-effect relation going on. No history of shingles in the area or trauma and does have occasional generalized type headaches that are easily treated with over-the-counter Tylenol. Also resting seems to help. Nothing makes the headaches worse that she has noticed. Currently taking care of 2 very young grandsons that require a lot of supervision but nothing extraordinary in terms of stress tension or anxiety. Is being treated for depression and anxiety at this time on Lexapro. At night she occasionally tosses and turns but seems to get adequate sleep. Has background history of coronary artery disease with stenting. Is on metoprolol aspirin simvastatin lisinopril Plavix glaucoma eyedrops supplemental calcium and previously referenced Lexapro. Does not imbibe alcohol and is not a smoker. Current Outpatient Prescriptions   Medication Sig Dispense Refill    clopidogrel (PLAVIX) 75 mg tab Take  by mouth.  escitalopram oxalate (LEXAPRO) 20 mg tablet Take 20 mg by mouth daily.  latanoprost (XALATAN) 0.005 % ophthalmic solution Administer 1 Drop to both eyes nightly.  metoprolol tartrate (LOPRESSOR) 25 mg tablet Take  by mouth two (2) times a day.  simvastatin (ZOCOR) 40 mg tablet Take  by mouth nightly.  aspirin 81 mg chewable tablet Take 81 mg by mouth daily.  lisinopril (PRINIVIL, ZESTRIL) 5 mg tablet Take  by mouth daily.       CALCIUM PO Take  by mouth. Allergies   Allergen Reactions    Lamictal [Lamotrigine] Rash and Itching     Past Medical History:   Diagnosis Date    Anxiety     Arthritis     CAD (coronary artery disease)     Depression     Fatigue     Headache 1988    migraines    Incontinence in female     Lung nodule       Past Surgical History:   Procedure Laterality Date    HX CATARACT REMOVAL  2009    HX CORONARY STENT PLACEMENT  3393,2531    HX HEENT      sinus    HX HIP REPLACEMENT  2016      Social History     Social History    Marital status:      Spouse name: N/A    Number of children: N/A    Years of education: N/A     Occupational History    Not on file. Social History Main Topics    Smoking status: Never Smoker    Smokeless tobacco: Never Used    Alcohol use No    Drug use: No    Sexual activity: No     Other Topics Concern    Not on file     Social History Narrative    No narrative on file      Family History   Problem Relation Age of Onset    Stroke Mother       Visit Vitals    /66    Pulse 61    Temp 98.2 °F (36.8 °C) (Oral)    Resp 18    Ht 5' 5\" (1.651 m)    Wt 57.6 kg (127 lb)    SpO2 96%    BMI 21.13 kg/m2        Review of Systems:   A comprehensive review of systems was negative except for that written in the HPI. Neuro Exam:     Appearance: The patient is well developed, well nourished, provides a coherent history and is in no acute distress. Mental Status: Oriented to time, place and person. Mood and affect appropriate. Cranial Nerves:   Intact visual fields. Fundi are benign. PREM, EOM's full, no nystagmus, no ptosis. Facial sensation is normal. Corneal reflexes are intact. Facial movement is symmetric. Hearing is normal bilaterally. Palate is midline with normal sternocleidomastoid and trapezius muscles are normal. Tongue is midline. Motor:  5/5 strength in upper and lower proximal and distal muscles. Normal bulk and tone. No fasciculations. Reflexes:   Deep tendon reflexes 2+/4 and symmetrical.   Sensory:   Normal to touch, pinprick and vibration. Gait:  Normal gait. Tremor:   No tremor noted. Cerebellar:  No cerebellar signs present. Neurovascular:  Normal heart sounds and regular rhythm, peripheral pulses intact, and no carotid bruits. Palpation of superficial temporal arteries objectively normal and subjectively nontender. Palpation of neck and greater occipital nerve distributions on both sides unrevealing. Neck range of motion complete and nontender. Assessment:   Localized dermatitis. Looks neurologically intact. I do not have an answer neurologically as to why there has been persistence with this dermatological condition for about a year. My closest approximation would be shingles but this does not look like shingles. Has occasional come and go headaches but nothing that suggest greater occipital neuralgia or similar consequences. I suggest she return to the dermatologist with continued care and observation. Her headaches as they are simply treated and satisfactorily with over-the-counter Tylenol and I cannot improve on that. Is welcome back as needed. Plan:   Revisit as needed.   Signed by :  Benton Rojas MD

## 2018-01-23 NOTE — MR AVS SNAPSHOT
Mayiradha [address]al[address]obby [address] [facility] [address] [facility] [address] [id number] [phone] Patient: Jax Walter MRN: [id number] PFY:[id number] Visit Information Date & Time Provider Department Dept. Phone Encounter #  
 1/23/2018  2:00 PM Allen Alcantara MD [facility] Neurology [facility] [phone] [id number] Follow-up Instructions Return if symptoms worsen or fail to improve. Upcoming Health Maintenance Date Due DTaP/Tdap/Td series (1 - Tdap) 10/31/1964 BREAST CANCER SCRN MAMMOGRAM 10/31/1993 FOBT Q 1 YEAR AGE 50-75 10/31/1993 ZOSTER VACCINE AGE 60> 8/31/2003 GLAUCOMA SCREENING Q2Y 10/31/2008 OSTEOPOROSIS SCREENING (DEXA) 10/31/2008 Pneumococcal 65+ Low/Medium Risk (1 of 2 - PCV13) 10/31/2008 MEDICARE YEARLY EXAM 10/31/2008 Influenza Age 5 to Adult 8/1/2017 Allergies as of 1/23/2018  Review Complete On: 1/23/2018 By: Allen Alcantara MD  
  
 Severity Noted Reaction Type Reactions Lamictal [Lamotrigine]  01/23/2018    Rash, Itching Current Immunizations  Never Reviewed No immunizations on file. Not reviewed this visit You Were Diagnosed With   
  
 Codes Comments Dermatitis    -  Primary ICD-10-CM: L30.9 ICD-9-CM: 692.9 Vitals BP Pulse Temp Resp Height(growth percentile) Weight(growth percentile) 122/66 61 98.2 °F (36.8 °C) (Oral) 18 5' 5\" (1.651 m) 127 lb (57.6 kg) SpO2 BMI OB Status Smoking Status 96% 21.13 kg/m2 Postmenopausal Never Smoker Vitals History BMI and BSA Data Body Mass Index Body Surface Area  
 21.13 kg/m 2 1.63 m 2 Your Updated Medication List  
  
   
This list is accurate as of: 1/23/18  2:43 PM.  Always use your most recent med list.  
  
  
  
  
 aspirin 81 mg chewable tablet Take 81 mg by mouth daily. CALCIUM PO Take  by mouth.  
  
 latanoprost 0.005 % ophthalmic solution Commonly known as:  Riley Sprawbradley Administer 1 Drop to both eyes nightly. LEXAPRO 20 mg tablet Generic drug:  escitalopram oxalate Take 20 mg by mouth daily. lisinopril 5 mg tablet Commonly known as:  Ladona Dunks Take  by mouth daily. metoprolol tartrate 25 mg tablet Commonly known as:  LOPRESSOR Take  by mouth two (2) times a day. PLAVIX 75 mg Tab Generic drug:  clopidogrel Take  by mouth. ZOCOR 40 mg tablet Generic drug:  simvastatin Take  by mouth nightly. Follow-up Instructions Return if symptoms worsen or fail to improve. Patient Instructions PRESCRIPTION REFILL POLICY [facility] Statement to Patients April 1, 2014 In an effort to ensure the large volume of patient prescription refills is processed in the most efficient and expeditious manner, we are asking our patients to assist us by calling your Pharmacy for all prescription refills, this will include also your  Mail Order Pharmacy. The pharmacy will contact our office electronically to continue the refill process. Please do not wait until the last minute to call your pharmacy. We need at least 48 hours (2days) to fill prescriptions. We also encourage you to call your pharmacy before going to  your prescription to make sure it is ready. With regard to controlled substance prescription refill requests (narcotic refills) that need to be picked up at our office, we ask your cooperation by providing us with at least 72 hours (3days) notice that you will need a refill. We will not refill narcotic prescription refill requests after 4:00pm on any weekday, Monday through Thursday, or after 2:00pm on Fridays, or on the weekends.   
  
We encourage everyone to explore another way of getting your prescription refill request processed using [employer], our patient web portal through our electronic medical record system. [facility] is an efficient and effective way to communicate your medication request directly to the office and  downloadable as an shaun on your smart phone . [facility] also features a review functionality that allows you to view your medication list as well as leave messages for your physician. Are you ready to get connected? If so please review the attatched instructions or speak to any of our staff to get you set up right away! Thank you so much for your cooperation. Should you have any questions please contact our Practice Administrator. The Physicians and Staff,  [facility] [address] What is a living will? A living will is a legal form you use to write down the kind of care you want at the end of your life. It is used by the health professionals who will treat you if you aren't able to decide for yourself. If you put your wishes in writing, your loved ones and others will know what kind of care you want. They won't need to guess. This can ease your mind and be helpful to others. A living will is not the same as an estate or property will. An estate will explains what you want to happen with your money and property after you die. Is a living will a legal document? A living will is a legal document. Each state has its own laws about living cannon. If you move to another state, make sure that your living will is legal in the state where you now live. Or you might use a universal form that has been approved by many states. This kind of form can sometimes be completed and stored online. Your electronic copy will then be available wherever you have a connection to the Internet. In most cases, doctors will respect your wishes even if you have a form from a different state. · You don't need an  to complete a living will.  But legal advice can be helpful if your state's laws are unclear, your health history is complicated, or your family can't agree on what should be in your living will. · You can change your living will at any time. Some people find that their wishes about end-of-life care change as their health changes. · In addition to making a living will, think about completing a medical power of  form. This form lets you name the person you want to make end-of-life treatment decisions for you (your \"health care agent\") if you're not able to. Many hospitals and nursing homes will give you the forms you need to complete a living will and a medical power of . · Your living will is used only if you can't make or communicate decisions for yourself anymore. If you become able to make decisions again, you can accept or refuse any treatment, no matter what you wrote in your living will. · Your state may offer an online registry. This is a place where you can store your living will online so the doctors and nurses who need to treat you can find it right away. What should you think about when creating a living will? Talk about your end-of-life wishes with your family members and your doctor. Let them know what you want. That way the people making decisions for you won't be surprised by your choices. Think about these questions as you make your living will: · Do you know enough about life support methods that might be used? If not, talk to your doctor so you know what might be done if you can't breathe on your own, your heart stops, or you're unable to swallow. · What things would you still want to be able to do after you receive life-support methods? Would you want to be able to walk? To speak? To eat on your own? To live without the help of machines? · If you have a choice, where do you want to be cared for? In your home? At a hospital or nursing home? · Do you want certain Congregation practices performed if you become very ill? · If you have a choice at the end of your life, where would you prefer to die? At home? In a hospital or nursing home? Somewhere else? · Would you prefer to be buried or cremated? · Do you want your organs to be donated after you die? What should you do with your living will? · Make sure that your family members and your health care agent have copies of your living will. · Give your doctor a copy of your living will to keep in your medical record. If you have more than one doctor, make sure that each one has a copy. · You may want to put a copy of your living will where it can be easily found. Where can you learn more? Go to http://tanya-mk.info/. Enter M025 in the search box to learn more about \"Learning About Living Karen Valenzuela. \" Current as of: September 24, 2016 Content Version: 11.4 © 2285-7544 [employer]. Care instructions adapted under license by [employer] (which disclaims liability or warranty for this information). If you have questions about a medical condition or this instruction, always ask your healthcare professional. [address] any warranty or liability for your use of this information. Patient history reviewed and patient examined. I honestly do not see any links nervous system wise to the persistent left occipital scalp issues. The headaches are simply taken care of by over-the-counter Tylenol and I cannot improve on that. Is welcome back as needed but at this time on this day looks neurologically normal. 
 
 
 
  
Introducing [facility]! Marlene Hernandez introduces [employer] patient portal. Now you can access parts of your medical record, email your doctor's office, and request medication refills online. 1. In your internet browser, go to [email or web address] 2. Click on the First Time User? Click Here link in the Sign In box. You will see the New Member Sign Up page. 3. Enter your [employer] Access Code exactly as it appears below. You will not need to use this code after youve completed the sign-up process. If you do not sign up before the expiration date, you must request a new code. · [employer] Access Code: [email or web address] Expires: 4/23/2018  1:33 PM 
 
4. Enter the last four digits of your Social Security Number (xxxx) and Date of Birth (mm/dd/yyyy) as indicated and click Submit. You will be taken to the next sign-up page. 5. Create a [employer] ID. This will be your [employer] login ID and cannot be changed, so think of one that is secure and easy to remember. 6. Create a [employer] password. You can change your password at any time. 7. Enter your Password Reset Question and Answer. This can be used at a later time if you forget your password. 8. Enter your e-mail address. You will receive e-mail notification when new information is available in [address]. 9. Click Sign Up. You can now view and download portions of your medical record. 10. Click the Download Summary menu link to download a portable copy of your medical information. If you have questions, please visit the Frequently Asked Questions section of the [employer] website. Remember, [employer] is NOT to be used for urgent needs. For medical emergencies, dial 911. Now available from your iPhone and Android! Please provide this summary of care documentation to your next provider. Your primary care clinician is listed as Skippselene Martinez. If you have any questions after today's visit, please call [phone].

## 2018-01-23 NOTE — PATIENT INSTRUCTIONS
[facility]   Statement to Patients  April 1, 2014      In an effort to ensure the large volume of patient prescription refills is processed in the most efficient and expeditious manner, we are asking our patients to assist us by calling your Pharmacy for all prescription refills, this will include also your  Mail Order Pharmacy. The pharmacy will contact our office electronically to continue the refill process. Please do not wait until the last minute to call your pharmacy. We need at least 48 hours (2days) to fill prescriptions. We also encourage you to call your pharmacy before going to  your prescription to make sure it is ready. With regard to controlled substance prescription refill requests (narcotic refills) that need to be picked up at our office, we ask your cooperation by providing us with at least 72 hours (3days) notice that you will need a refill. We will not refill narcotic prescription refill requests after 4:00pm on any weekday, Monday through Thursday, or after 2:00pm on Fridays, or on the weekends. We encourage everyone to explore another way of getting your prescription refill request processed using [employer], our patient web portal through our electronic medical record system. [employer] is an efficient and effective way to communicate your medication request directly to the office and  downloadable as an shaun on your smart phone . [employer] also features a review functionality that allows you to view your medication list as well as leave messages for your physician. Are you ready to get connected? If so please review the attatched instructions or speak to any of our staff to get you set up right away! Thank you so much for your cooperation. Should you have any questions please contact our Practice Administrator.     The Physicians and Staff,  [facility] [address]  What is a living will?    A living will is a legal form you use to write down the kind of care you want at the end of your life. It is used by the health professionals who will treat you if you aren't able to decide for yourself. If you put your wishes in writing, your loved ones and others will know what kind of care you want. They won't need to guess. This can ease your mind and be helpful to others. A living will is not the same as an estate or property will. An estate will explains what you want to happen with your money and property after you die. Is a living will a legal document? A living will is a legal document. Each state has its own laws about living cannon. If you move to another state, make sure that your living will is legal in the state where you now live. Or you might use a universal form that has been approved by many states. This kind of form can sometimes be completed and stored online. Your electronic copy will then be available wherever you have a connection to the Internet. In most cases, doctors will respect your wishes even if you have a form from a different state. · You don't need an  to complete a living will. But legal advice can be helpful if your state's laws are unclear, your health history is complicated, or your family can't agree on what should be in your living will. · You can change your living will at any time. Some people find that their wishes about end-of-life care change as their health changes. · In addition to making a living will, think about completing a medical power of  form. This form lets you name the person you want to make end-of-life treatment decisions for you (your \"health care agent\") if you're not able to. Many hospitals and nursing homes will give you the forms you need to complete a living will and a medical power of . · Your living will is used only if you can't make or communicate decisions for yourself anymore.  If you become able to make decisions again, you can accept or refuse any treatment, no matter what you wrote in your living will. · Your state may offer an online registry. This is a place where you can store your living will online so the doctors and nurses who need to treat you can find it right away. What should you think about when creating a living will? Talk about your end-of-life wishes with your family members and your doctor. Let them know what you want. That way the people making decisions for you won't be surprised by your choices. Think about these questions as you make your living will:  · Do you know enough about life support methods that might be used? If not, talk to your doctor so you know what might be done if you can't breathe on your own, your heart stops, or you're unable to swallow. · What things would you still want to be able to do after you receive life-support methods? Would you want to be able to walk? To speak? To eat on your own? To live without the help of machines? · If you have a choice, where do you want to be cared for? In your home? At a hospital or nursing home? · Do you want certain Druze practices performed if you become very ill? · If you have a choice at the end of your life, where would you prefer to die? At home? In a hospital or nursing home? Somewhere else? · Would you prefer to be buried or cremated? · Do you want your organs to be donated after you die? What should you do with your living will? · Make sure that your family members and your health care agent have copies of your living will. · Give your doctor a copy of your living will to keep in your medical record. If you have more than one doctor, make sure that each one has a copy. · You may want to put a copy of your living will where it can be easily found. Where can you learn more? Go to http://tanya-mk.info/. Enter A838 in the search box to learn more about \"Learning About Living [place]. \"  Current as of: September 24, 2016  Content Version: 11.4  © 2914-2055 Healthwise, [employer]. Care instructions adapted under license by [employer] (which disclaims liability or warranty for this information). If you have questions about a medical condition or this instruction, always ask your healthcare professional. [address] any warranty or liability for your use of this information. Patient history reviewed and patient examined. I honestly do not see any links nervous system wise to the persistent left occipital scalp issues. The headaches are simply taken care of by over-the-counter Tylenol and I cannot improve on that.   Is welcome back as needed but at this time on this day looks neurologically normal.

## 2018-02-26 ENCOUNTER — OFFICE VISIT (OUTPATIENT)
Dept: FAMILY MEDICINE CLINIC | Age: 73
End: 2018-02-26

## 2018-02-26 VITALS
DIASTOLIC BLOOD PRESSURE: 60 MMHG | RESPIRATION RATE: 18 BRPM | BODY MASS INDEX: 21.16 KG/M2 | OXYGEN SATURATION: 96 % | HEIGHT: 65 IN | WEIGHT: 127 LBS | SYSTOLIC BLOOD PRESSURE: 154 MMHG | HEART RATE: 78 BPM | TEMPERATURE: 99.5 F

## 2018-02-26 DIAGNOSIS — J02.9 SORE THROAT: ICD-10-CM

## 2018-02-26 DIAGNOSIS — R07.89 CHEST TIGHTNESS: ICD-10-CM

## 2018-02-26 DIAGNOSIS — R05.9 COUGH: Primary | ICD-10-CM

## 2018-02-26 LAB
S PYO AG THROAT QL: NEGATIVE
VALID INTERNAL CONTROL?: YES

## 2018-02-26 RX ORDER — OSELTAMIVIR PHOSPHATE 75 MG/1
75 CAPSULE ORAL 2 TIMES DAILY
Qty: 10 CAP | Refills: 0 | Status: SHIPPED | OUTPATIENT
Start: 2018-02-26 | End: 2018-03-03

## 2018-02-26 NOTE — MR AVS SNAPSHOT
[address] 
[phone] Patient: Nigel Oneil MRN: [id number] YCM:[id number] Visit Information Date & Time Provider Department Dept. Phone Encounter #  
 2/26/2018  6:45 PM Nash Ly, [address] [place] [phone] [id number] Follow-up Instructions Return if symptoms worsen or fail to improve. Your Appointments 5/16/2018 11:40 AM  
ESTABLISHED PATIENT with Megan Herrmann MD  
[facility] ([address]) Appt Note: 6 mo fu  
 320 [facility] 600 1007 [facility]  
[address] [address] [address] Upcoming Health Maintenance Date Due DTaP/Tdap/Td series (1 - Tdap) 10/31/1964 FOBT Q 1 YEAR AGE 50-75 10/31/1993 ZOSTER VACCINE AGE 60> 8/31/2003 GLAUCOMA SCREENING Q2Y 10/31/2008 Pneumococcal 65+ Low/Medium Risk (1 of 2 - PCV13) 10/31/2008 MEDICARE YEARLY EXAM 12/18/2016 BREAST CANCER SCRN MAMMOGRAM 8/30/2019 Allergies as of 2/26/2018  Review Complete On: 2/26/2018 By: Nash Ly MD  
  
 Severity Noted Reaction Type Reactions Lamictal [Lamotrigine] High 03/08/2017    Anaphylaxis Current Immunizations  Never Reviewed Name Date Influenza Vaccine 10/14/2015 Not reviewed this visit You Were Diagnosed With   
  
 Codes Comments Cough    -  Primary ICD-10-CM: U80 ICD-9-CM: 375. 2 Chest tightness     ICD-10-CM: R07.89 ICD-9-CM: 786.59 Sore throat     ICD-10-CM: J02.9 ICD-9-CM: 798 Vitals BP Pulse Temp Resp Height(growth percentile) Weight(growth percentile) 154/60 (BP 1 Location: Right arm, BP Patient Position: Sitting) 78 99.5 °F (37.5 °C) (Oral) 18 5' 5\" (1.651 m) 127 lb (57.6 kg) SpO2 BMI OB Status Smoking Status 96% 21.13 kg/m2 Postmenopausal Never Smoker Vitals History BMI and BSA Data Body Mass Index Body Surface Area  
 21.13 kg/m 2 1.63 m 2 [facility] [address] [phone] Your Updated Medication List  
  
   
This list is accurate as of 2/26/18  7:58 PM.  Always use your most recent med list.  
  
  
  
  
 aspirin, buffered 81 mg Tab Take  by mouth. CALCIUM 600 + D 600-125 mg-unit Tab Generic drug:  calcium-cholecalciferol (d3) Take 1 Tab by mouth two (2) times a day. clopidogrel 75 mg Tab Commonly known as:  PLAVIX TAKE 1 TABLET EVERY DAY  
  
 gabapentin 300 mg capsule Commonly known as:  NEURONTIN Take 300 mg by mouth three (3) times daily. latanoprost 0.005 % ophthalmic solution Commonly known as:  Busterselene Khan Administer 1 Drop to both eyes nightly. LEXAPRO 20 mg tablet Generic drug:  escitalopram oxalate Take 20 mg by mouth daily. lisinopril 5 mg tablet Commonly known as:  Layla [facility] Take 1 Tab by mouth daily. melatonin 5 mg Cap capsule  
  
 metoprolol tartrate 25 mg tablet Commonly known as:  LOPRESSOR  
TAKE 1 TABLET TWICE DAILY  
  
 oseltamivir 75 mg capsule Commonly known as:  TAMIFLU Take 1 Cap by mouth two (2) times a day for 5 days. simvastatin 40 mg tablet Commonly known as:  ZOCOR  
TAKE 1 TABLET EVERY NIGHT  
  
 triamcinolone 0.1 % lotion Commonly known as:  KENALOG Apply  to affected area as needed. use thin layer VENTOLIN HFA 90 mcg/actuation inhaler Generic drug:  albuterol Prescriptions Printed Refills  
 oseltamivir (TAMIFLU) 75 mg capsule 0 Sig: Take 1 Cap by mouth two (2) times a day for 5 days. Class: Print Route: Oral  
  
We Performed the Following AMB POC RAPID STREP A [07486 CPT(R)] Follow-up Instructions Return if symptoms worsen or fail to improve. To-Do List   
 02/26/2018 Imaging:  XR CHEST PA LAT Patient Instructions   
-I recommend supportive care including rest, adequate hydration, warm salt water gargle, cough drops, warm tea with honey, ibuprofen/tylenol as needed. -Return to clinic if symptoms worsen or fail to improve and/or if you develop fever non-responsive to anti-pyretic (tylenol/ibuprofen), persistent nausea/vomiting, unable to eat/drink, dehydration, confusion/altered mental status. Upper Respiratory Infection (Cold): Care Instructions Your Care Instructions An upper respiratory infection, or URI, is an infection of the nose, sinuses, or throat. URIs are spread by coughs, sneezes, and direct contact. The common cold is the most frequent kind of URI. The flu and sinus infections are other kinds of URIs. Almost all URIs are caused by viruses. Antibiotics won't cure them. But you can treat most infections with home care. This may include drinking lots of fluids and taking over-the-counter pain medicine. You will probably feel better in 4 to 10 days. The doctor has checked you carefully, but problems can develop later. If you notice any problems or new symptoms, get medical treatment right away. Follow-up care is a key part of your treatment and safety. Be sure to make and go to all appointments, and call your doctor if you are having problems. It's also a good idea to know your test results and keep a list of the medicines you take. How can you care for yourself at home? · To prevent dehydration, drink plenty of fluids, enough so that your urine is light yellow or clear like water. Choose water and other caffeine-free clear liquids until you feel better. If you have kidney, heart, or liver disease and have to limit fluids, talk with your doctor before you increase the amount of fluids you drink. · Take an over-the-counter pain medicine, such as acetaminophen (Tylenol), ibuprofen (Advil, Motrin), or naproxen (Aleve).  Read and follow all instructions on the label. · Before you use cough and cold medicines, check the label. These medicines may not be safe for young children or for people with certain health problems. · Be careful when taking over-the-counter cold or flu medicines and Tylenol at the same time. Many of these medicines have acetaminophen, which is Tylenol. Read the labels to make sure that you are not taking more than the recommended dose. Too much acetaminophen (Tylenol) can be harmful. · Get plenty of rest. 
· Do not smoke or allow others to smoke around you. If you need help quitting, talk to your doctor about stop-smoking programs and medicines. These can increase your chances of quitting for good. When should you call for help? Call 911 anytime you think you may need emergency care. For example, call if: 
? · You have severe trouble breathing. ?Call your doctor now or seek immediate medical care if: 
? · You seem to be getting much sicker. ? · You have new or worse trouble breathing. ? · You have a new or higher fever. ? · You have a new rash. ? Watch closely for changes in your health, and be sure to contact your doctor if: 
? · You have a new symptom, such as a sore throat, an earache, or sinus pain. ? · You cough more deeply or more often, especially if you notice more mucus or a change in the color of your mucus. ? · You do not get better as expected. Where can you learn more? Go to http://tanya-mk.info/. Enter Q320 in the search box to learn more about \"Upper Respiratory Infection (Cold): Care Instructions. \" Current as of: May 12, 2017 Content Version: 11.4 © 2940-5280 [employer]. Care instructions adapted under license by [employer] (which disclaims liability or warranty for this information).  If you have questions about a medical condition or this instruction, always ask your healthcare professional. [employer] disclaims any warranty or liability for your use of this information. Introducing [facility]! Solomon Covarrubias introduces Qvolve patient portal. Now you can access parts of your medical record, email your doctor's office, and request medication refills online. 1. In your internet browser, go to [email or web address] 2. Click on the First Time User? Click Here link in the Sign In box. You will see the New Member Sign Up page. 3. Enter your Qvolve Access Code exactly as it appears below. You will not need to use this code after youve completed the sign-up process. If you do not sign up before the expiration date, you must request a new code. · Qvolve Access Code: [email or web address] Expires: 5/27/2018  7:58 PM 
 
4. Enter the last four digits of your Social Security Number (xxxx) and Date of Birth (mm/dd/yyyy) as indicated and click Submit. You will be taken to the next sign-up page. 5. Create a Qvolve ID. This will be your Qvolve login ID and cannot be changed, so think of one that is secure and easy to remember. 6. Create a Qvolve password. You can change your password at any time. 7. Enter your Password Reset Question and Answer. This can be used at a later time if you forget your password. 8. Enter your e-mail address. You will receive e-mail notification when new information is available in [address] 9. Click Sign Up. You can now view and download portions of your medical record. 10. Click the Download Summary menu link to download a portable copy of your medical information. If you have questions, please visit the Frequently Asked Questions section of the Qvolve website. Remember, Qvolve is NOT to be used for urgent needs. For medical emergencies, dial 911. Now available from your iPhone and Android! Please provide this summary of care documentation to your next provider. Your primary care clinician is listed as Prieto Barajas. If you have any questions after today's visit, please call [phone].

## 2018-02-26 NOTE — PROGRESS NOTES
Chief Complaint   Patient presents with    Headache     headache x 2 days    Nasal Congestion    Cough    Sore Throat     1. Have you been to the ER, urgent care clinic since your last visit? Hospitalized since your last visit? No    2. Have you seen or consulted any other health care providers outside of the [address] [place] [address] since your last visit? Include any pap smears or colon screening. No     Reviewed record in preparation for visit and have obtained necessary documentation.

## 2018-02-26 NOTE — PROGRESS NOTES
Chief Complaint:  Chief Complaint   Patient presents with    Headache     headache x 2 days    Nasal Congestion    Cough    Sore Throat     HPI  Cathleen Grigsby is a 74 y.o. female who presents for two days of worsening all-over headache, runny nose, chills, body aches, low energy. No known temperature. Her breathing feels tight. Has tried tylenol with some relief. Eating a little less than normal.  Staying well hydrated. She got the flu shot this season. ROS:   2 loose BMs today  No vomiting  Dry cough    Allergies: Allergies   Allergen Reactions    Lamictal [Lamotrigine] Anaphylaxis     Meds:   Current Outpatient Prescriptions   Medication Sig Dispense Refill    oseltamivir (TAMIFLU) 75 mg capsule Take 1 Cap by mouth two (2) times a day for 5 days. 10 Cap 0    metoprolol tartrate (LOPRESSOR) 25 mg tablet TAKE 1 TABLET TWICE DAILY 180 Tab 1    clopidogrel (PLAVIX) 75 mg tab TAKE 1 TABLET EVERY DAY 90 Tab 1    simvastatin (ZOCOR) 40 mg tablet TAKE 1 TABLET EVERY NIGHT 90 Tab 2    gabapentin (NEURONTIN) 300 mg capsule Take 300 mg by mouth three (3) times daily.  triamcinolone (KENALOG) 0.1 % lotion Apply  to affected area as needed. use thin layer      lisinopril (PRINIVIL, ZESTRIL) 5 mg tablet Take 1 Tab by mouth daily. 90 Tab 3    VENTOLIN HFA 90 mcg/actuation inhaler       melatonin 5 mg cap capsule       escitalopram oxalate (LEXAPRO) 20 mg tablet Take 20 mg by mouth daily.  Aspirin, Buffered 81 mg tab Take  by mouth.  calcium-cholecalciferol, d3, (CALCIUM 600 + D) 600-125 mg-unit tab Take 1 Tab by mouth two (2) times a day.  latanoprost (XALATAN) 0.005 % ophthalmic solution Administer 1 Drop to both eyes nightly.        PMH:  Past Medical History:   Diagnosis Date    COPD (chronic obstructive pulmonary disease) ([facility] [address] 1/7/2016    Coronary atherosclerosis of native coronary artery 2006    MI/Left ventricular wall motion is segmentally abnormal with mild hypokinesis of the anterior wall. Ejection Fraction is estimated at 50%. LAD >90% stenosis. LCX 50% stenosis.  Elevated BP 2/1/2017    Family history of suicide     father    GERD (gastroesophageal reflux disease)     Glaucoma     Hypertension, essential 11/1/2017    Mixed hyperlipidemia     Osteopenia 2015    DEXA 2010, 2015    S/P angioplasty with stent     stent to LAD. 3X8mm [place]. SH:  Smoker:  History   Smoking Status    Never Smoker   Smokeless Tobacco    Never Used     Physical Exam:  Visit Vitals    /60 (BP 1 Location: Right arm, BP Patient Position: Sitting)    Pulse 78    Temp 99.5 °F (37.5 °C) (Oral)    Resp 18    Ht 5' 5\" (1.651 m)    Wt 127 lb (57.6 kg)    SpO2 96%    BMI 21.13 kg/m2     Gen: No apparent distress. Pleasant. HEENT: Normocephalic, pupils equally round and reactive, extraocular eye movements intact, hearing grossly normal bilaterally, nose normal and patent with no erythema, mucous membranes moist, pharynx normal without lesions  Neck: Supple, no lymph nodes, masses, or thyromegaly appreciated  Lungs: Respirations unlabored, clear to auscultation bilaterally  Cardio: Regular rate and rhythm without murmurs, rubs, or gallops   Abdomen: Normoactive bowel sounds, soft, nontender, nondistended  Ext: No peripheral edema, erythema, or tenderness  Skin: Warm and dry  Neuro: Alert and responds to all questions appropriately. CN 2-12, sensation, strength, gait grossly intact. Psych: Makes good eye contact. Appearance, behavior, and conversation appropriate with normal speech rate, fluency, content. Assessment and Plan:     Encounter Diagnoses:    ICD-10-CM ICD-9-CM    1. Cough R05 786.2 XR CHEST PA LAT   2. Chest tightness R07.89 786.59 XR CHEST PA LAT   3. Sore throat J02.9 462 AMB POC RAPID STREP A     -VSS. Reassuring PE. Rapid strep negative.  CXR personally reviewed and does not show evidence of pneumonia.  -Will treat for potential influenza (no testing kits available) with tamiflu 75 mg bid x 5 days.  -Encouraged supportive care including rest, adequate hydration, warm salt water gargle, cough drops, warm tea with honey, ibuprofen/tylenol prn.   -Rtc if sxs worsen or fail to improve and/or if develops fever non-responsive to anti-pyretic, persistent N/V, unable to take po, altered mental status. Discussed diagnoses in detail with patient. Patient expressed understanding of and agreement to above plan. All questions and concerns addressed. Medication risks/benefits/side effects discussed with patient. Patient is counseled to return to the office and/or ED if symptoms do not improve as expected. Patient seen and discussed with Dr. Salma Mike, Attending Physician.     Radha Conteh MD  Family Medicine Resident, PGY-3

## 2018-02-27 ENCOUNTER — TELEPHONE (OUTPATIENT)
Dept: CARDIOLOGY CLINIC | Age: 73
End: 2018-02-27

## 2018-02-27 NOTE — TELEPHONE ENCOUNTER
Corbin Sanz with Dr Wolf Allardt office called and said pt was seen with what she thought was dermatitis. The dermatologist thinks it may be a reaction to the medication. Dr Sisi Tovar would like to speak with you on this.   [address]

## 2018-02-27 NOTE — PATIENT INSTRUCTIONS
-I recommend supportive care including rest, adequate hydration, warm salt water gargle, cough drops, warm tea with honey, ibuprofen/tylenol as needed. -Return to clinic if symptoms worsen or fail to improve and/or if you develop fever non-responsive to anti-pyretic (tylenol/ibuprofen), persistent nausea/vomiting, unable to eat/drink, dehydration, confusion/altered mental status. Upper Respiratory Infection (Cold): Care Instructions  Your Care Instructions    An upper respiratory infection, or URI, is an infection of the nose, sinuses, or throat. URIs are spread by coughs, sneezes, and direct contact. The common cold is the most frequent kind of URI. The flu and sinus infections are other kinds of URIs. Almost all URIs are caused by viruses. Antibiotics won't cure them. But you can treat most infections with home care. This may include drinking lots of fluids and taking over-the-counter pain medicine. You will probably feel better in 4 to 10 days. The doctor has checked you carefully, but problems can develop later. If you notice any problems or new symptoms, get medical treatment right away. Follow-up care is a key part of your treatment and safety. Be sure to make and go to all appointments, and call your doctor if you are having problems. It's also a good idea to know your test results and keep a list of the medicines you take. How can you care for yourself at home? · To prevent dehydration, drink plenty of fluids, enough so that your urine is light yellow or clear like water. Choose water and other caffeine-free clear liquids until you feel better. If you have kidney, heart, or liver disease and have to limit fluids, talk with your doctor before you increase the amount of fluids you drink. · Take an over-the-counter pain medicine, such as acetaminophen (Tylenol), ibuprofen (Advil, Motrin), or naproxen (Aleve). Read and follow all instructions on the label.   · Before you use cough and cold medicines, check the label. These medicines may not be safe for young children or for people with certain health problems. · Be careful when taking over-the-counter cold or flu medicines and Tylenol at the same time. Many of these medicines have acetaminophen, which is Tylenol. Read the labels to make sure that you are not taking more than the recommended dose. Too much acetaminophen (Tylenol) can be harmful. · Get plenty of rest.  · Do not smoke or allow others to smoke around you. If you need help quitting, talk to your doctor about stop-smoking programs and medicines. These can increase your chances of quitting for good. When should you call for help? Call 911 anytime you think you may need emergency care. For example, call if:  ? · You have severe trouble breathing. ?Call your doctor now or seek immediate medical care if:  ? · You seem to be getting much sicker. ? · You have new or worse trouble breathing. ? · You have a new or higher fever. ? · You have a new rash. ? Watch closely for changes in your health, and be sure to contact your doctor if:  ? · You have a new symptom, such as a sore throat, an earache, or sinus pain. ? · You cough more deeply or more often, especially if you notice more mucus or a change in the color of your mucus. ? · You do not get better as expected. Where can you learn more? Go to http://tanya-mk.info/. Enter S578 in the search box to learn more about \"Upper Respiratory Infection (Cold): Care Instructions. \"  Current as of: May 12, 2017  Content Version: 11.4  © 7756-7333 [employer]. Care instructions adapted under license by [employer] (which disclaims liability or warranty for this information). If you have questions about a medical condition or this instruction, always ask your healthcare professional. [address] any warranty or liability for your use of this information.

## 2018-02-27 NOTE — TELEPHONE ENCOUNTER
Verified patient with two types of identifiers. Dr. Sanjay Blair is concerned that lisinopril may be causing reaction in patient. Wants to know if we can change to different medication? Will ask MD and return call to office.

## 2018-02-28 NOTE — TELEPHONE ENCOUNTER
She can stop the lisinopril and then start amlodipine 5 mg daily , watch for edema  Keep bp diary three days a week and update us on bp in 3 weeks with 10 #s

## 2018-02-28 NOTE — PROGRESS NOTES
I reviewed with the resident the patient's medical history and the resident's history and findings on the physical examination. I saw patient and discussed assessment and plan with the resident. I concur with the findings and plan as documented in the resident's note with additions noted.  Mallorie Stevenson MD

## 2018-03-01 RX ORDER — AMLODIPINE BESYLATE 5 MG/1
5 TABLET ORAL DAILY
Qty: 90 TAB | Refills: 1 | Status: SHIPPED | OUTPATIENT
Start: 2018-03-01 | End: 2018-07-11 | Stop reason: SDUPTHER

## 2018-03-01 NOTE — TELEPHONE ENCOUNTER
Verified patient with two types of identifiers. Per Dr. Angel Trivedi instructed patient to stop lisinopril. It was going to be changed to amlodipine 5 mg, daily. Instructed patient to watch for edema and to monitor BP 3 times/week and keep BP diary. After 10 #'s to call office with update. Pharmacy confirmed. Verbalizes understanding. And will call with any questions or concerns.

## 2018-03-02 NOTE — TELEPHONE ENCOUNTER
Verified patient with two types of identifiers. Krys Weaver called from Dr. Brit Nicole office to follow up on lisinopril. Informed her that Lisinopril had be DC and changed to amlodipine. Patient had already been notified and updated with changes.

## 2018-03-05 ENCOUNTER — OFFICE VISIT (OUTPATIENT)
Dept: FAMILY MEDICINE CLINIC | Age: 73
End: 2018-03-05

## 2018-03-05 VITALS
HEIGHT: 65 IN | TEMPERATURE: 98.6 F | SYSTOLIC BLOOD PRESSURE: 149 MMHG | OXYGEN SATURATION: 99 % | HEART RATE: 61 BPM | DIASTOLIC BLOOD PRESSURE: 61 MMHG | BODY MASS INDEX: 20.49 KG/M2 | WEIGHT: 123 LBS | RESPIRATION RATE: 16 BRPM

## 2018-03-05 DIAGNOSIS — J11.1 INFLUENZA: ICD-10-CM

## 2018-03-05 DIAGNOSIS — R05.9 COUGH: Primary | ICD-10-CM

## 2018-03-05 RX ORDER — CODEINE PHOSPHATE AND GUAIFENESIN 10; 100 MG/5ML; MG/5ML
5 SOLUTION ORAL
Qty: 118 ML | Refills: 0 | Status: SHIPPED | OUTPATIENT
Start: 2018-03-05 | End: 2018-06-06

## 2018-03-05 RX ORDER — PREDNISONE 20 MG/1
20 TABLET ORAL
Qty: 5 TAB | Refills: 0 | Status: SHIPPED | OUTPATIENT
Start: 2018-03-05 | End: 2018-06-06

## 2018-03-05 NOTE — PROGRESS NOTES
CC:  Follow up for Flu and persistent Rash       HPI:    Flu symptoms:    -cough persists   -still fatigued  - muscle aches have resolved  - + HA , tylenol has helped, taking every 4-6hrs   - scratchy throat. - Still runny nose, congestion, no fevers   - States her breathing feels labored, however she reports this is only while she is coughing.    - Chest tightness still present - occurs with cough. - Feels that cough syrup not helping for past 3-4 days that she has been taking it    Persistent Rash:   -Rash on back, hands, scalp, stomach. Rash is itchy.   - onset Feb 2017 - at the time, she was thought to have an allergy to Lamictal   -  Each rash lasts ~ 1wk, except on her back where it has been present for the whole yr since symptom onset  -  Going to Dermatologist for 1yr and not improving. Has had several biopsies which were negative. - Previously was on cyclosporine    -Currently taking fluocinonide steroid cream without any improvement   - Dermatologist wants to put her on methotrexate.   -also has seen allergist. Had negative skin test and was told rash was not due to allergy. - has tried multiple doses of prednisone for rash without relief  - wants to know if there is another specialist to go to   -Psychiatrist took her off lamictal thinking it was the cause.  Cardiologist also changed BP meds - taken off lisinopril, however no improvement in rash      Review of Systems:  Constitional: No fevers, no chills   Resp: Denies SOB,reports labored breathing when coughing  GI: denies abdominal pain, or n/v/d/c  LE: Denies any swelling or edema         Current Outpatient Prescriptions:     metoprolol tartrate (LOPRESSOR) 25 mg tablet, TAKE 1 TABLET TWICE DAILY, Disp: 180 Tab, Rfl: 1    clopidogrel (PLAVIX) 75 mg tab, TAKE 1 TABLET EVERY DAY, Disp: 90 Tab, Rfl: 1    simvastatin (ZOCOR) 40 mg tablet, TAKE 1 TABLET EVERY NIGHT, Disp: 90 Tab, Rfl: 2    melatonin 5 mg cap capsule, , Disp: , Rfl:    escitalopram oxalate (LEXAPRO) 20 mg tablet, Take 20 mg by mouth daily. , Disp: , Rfl:     Aspirin, Buffered 81 mg tab, Take  by mouth., Disp: , Rfl:     calcium-cholecalciferol, d3, (CALCIUM 600 + D) 600-125 mg-unit tab, Take 1 Tab by mouth two (2) times a day., Disp: , Rfl:     latanoprost (XALATAN) 0.005 % ophthalmic solution, Administer 1 Drop to both eyes nightly., Disp: , Rfl:     amLODIPine (NORVASC) 5 mg tablet, Take 1 Tab by mouth daily. , Disp: 90 Tab, Rfl: 1    gabapentin (NEURONTIN) 300 mg capsule, Take 300 mg by mouth three (3) times daily. , Disp: , Rfl:     triamcinolone (KENALOG) 0.1 % lotion, Apply  to affected area as needed. use thin layer, Disp: , Rfl:     VENTOLIN HFA 90 mcg/actuation inhaler, , Disp: , Rfl:     Allergies   Allergen Reactions    Lamictal [Lamotrigine] Anaphylaxis         Past Medical History:   Diagnosis Date    COPD (chronic obstructive pulmonary disease) ([facility] 1/7/2016    Coronary atherosclerosis of native coronary artery 2006    MI/Left ventricular wall motion is segmentally abnormal with mild hypokinesis of the anterior wall. Ejection Fraction is estimated at 50%. LAD >90% stenosis. LCX 50% stenosis.  Elevated BP 2/1/2017    Family history of suicide     father    GERD (gastroesophageal reflux disease)     Glaucoma     Hypertension, essential 11/1/2017    Mixed hyperlipidemia     Osteopenia 2015    DEXA 2010, 2015    S/P angioplasty with stent     stent to LAD. 3X8mm Cypher. Social History     Social History    Marital status:      Spouse name: N/A    Number of children: N/A    Years of education: N/A     Occupational History    Not on file.      Social History Main Topics    Smoking status: Never Smoker    Smokeless tobacco: Never Used    Alcohol use No    Drug use: No    Sexual activity: No     Other Topics Concern    Not on file     Social History Narrative          Family History   Problem Relation Age of Onset    Suicide Father     Heart Disease Sister       61    Cancer Brother      bladder cancer         Physical Exam:     Visit Vitals    /61 (BP 1 Location: Left arm, BP Patient Position: Sitting)    Pulse 61    Temp 98.6 °F (37 °C) (Oral)    Resp 16    Ht 5' 5\" (1.651 m)    Wt 123 lb (55.8 kg)    SpO2 99%    BMI 20.47 kg/m2       General appearance: alert, oriented, NAD   HEENT: PERRLA, moist mucus membranes, no LAD, dry scalp with few erythematous healing rashes along occiput  CV: RRR, S1/S2 heard, no m/r/g  Resp: CTAB, no wheezing, no crackles, no rhonchi   Abdominal: soft, non-tender to palpation, +BS, Healing erythematous macular-papular rash along lower abdomen. Extremities: no swelling or edema   Skin: Erythematous macular-papular rash along mid and lower Back with excoriation. Assessment/Plan:     1. Cough: Persistent cough with subsequent chest tightness. Given history of COPD, patient could have component of mild exacerbation triggered by the flu; however, patient is currently non-tachypenic, non-hypoxic, and lacks any wheezing on lung exam. The clear lung exam, along with the normal vitals and lack of fever make PNA less likely. Given these findings and normal CXR at previous visit last week, repeat imaging is not likely to be helpful.   - guaiFENesin-codeine (ROBITUSSIN AC) 100-10 mg/5 mL solution; Take 5 mL by mouth three (3) times daily as needed for Cough. Max Daily Amount: 15 mL. Dispense: 118 mL; Refill: 0  -Patient will continue supportive measures such as lozenges and staying well hydrated   - predniSONE (DELTASONE) 20 mg tablet; Take 1 Tab by mouth daily (with breakfast). Dispense: 5 Tab; Refill: 0  - Patient advised to use home Albuterol inhaler: 2 puffs Q4hrs PRN     2. Influenza: S/P 5 days tamiflu tx with continued persistent cough but no fevers and myalgias have resolved. - guaiFENesin-codeine (ROBITUSSIN AC) 100-10 mg/5 mL solution;  Take 5 mL by mouth three (3) times daily as needed for Cough. Max Daily Amount: 15 mL. Dispense: 118 mL; Refill: 0  - predniSONE (DELTASONE) 20 mg tablet; Take 1 Tab by mouth daily (with breakfast). Dispense: 5 Tab; Refill: 0    3. Persistent Rash: Patient has rash of undetermined etiology.  Currently being worked up by established dermatologist who is considering starting patient on methotrexate.   -Continue current treatment plan initiated by dermatologist       Patient seen and discussed with Dr. Demetria Ochoa MD  Family Medicine Resident

## 2018-03-05 NOTE — MR AVS SNAPSHOT
[address] 
[phone] Patient: Faith Diallo MRN: [id number] GLQ:[id number] Visit Information Date & Time Provider Department Dept. Phone Encounter #  
 3/5/2018 10:30 AM Kelvin Garcia MD [address] [place] [address] [phone] [id number] Your Appointments 5/16/2018 11:40 AM  
ESTABLISHED PATIENT with Era Reed MD  
[facility] ([address]) Appt Note: 6 mo fu  
 [address] [facility] [address] 100[address] [facility]  
[address] [address] [address] Upcoming Health Maintenance Date Due DTaP/Tdap/Td series (1 - Tdap) 10/31/1964 FOBT Q 1 YEAR AGE 50-75 10/31/1993 ZOSTER VACCINE AGE 60> 8/31/2003 GLAUCOMA SCREENING Q2Y 10/31/2008 Pneumococcal 65+ Low/Medium Risk (1 of 2 - PCV13) 10/31/2008 MEDICARE YEARLY EXAM 12/18/2016 BREAST CANCER SCRN MAMMOGRAM 8/30/2019 Allergies as of 3/5/2018  Review Complete On: 2/26/2018 By: Damian Hare MD  
  
 Severity Noted Reaction Type Reactions Lamictal [Lamotrigine] High 03/08/2017    Anaphylaxis Current Immunizations  Never Reviewed Name Date Influenza Vaccine 10/14/2015 Not reviewed this visit You Were Diagnosed With   
  
 Codes Comments Cough    -  Primary ICD-10-CM: F21 ICD-9-CM: 786.2 Influenza     ICD-10-CM: J11.1 ICD-9-CM: 527.2 COPD exacerbation [facility]     ICD-10-CM: J44.1 ICD-9-CM: 491.21 Vitals BP Pulse Temp Resp Height(growth percentile) Weight(growth percentile) 149/61 (BP 1 Location: Left arm, BP Patient Position: Sitting) 61 98.6 °F (37 °C) (Oral) 16 5' 5\" (1.651 m) 123 lb (55.8 kg) SpO2 BMI OB Status Smoking Status 99% 20.47 kg/m2 Postmenopausal Never Smoker Vitals History BMI and BSA Data  Body Mass Index Body Surface Area  
 20.47 kg/m 2 1.6 m 2  
 Preferred Pharmacy Pharmacy Name Phone [facility], [place] - [address] [phone] Your Updated Medication List  
  
   
This list is accurate as of 3/5/18 11:28 AM.  Always use your most recent med list. amLODIPine 5 mg tablet Commonly known as:  Arva Brazen Take 1 Tab by mouth daily. aspirin, buffered 81 mg Tab Take  by mouth. CALCIUM 600 + D 600-125 mg-unit Tab Generic drug:  calcium-cholecalciferol (d3) Take 1 Tab by mouth two (2) times a day. clopidogrel 75 mg Tab Commonly known as:  PLAVIX TAKE 1 TABLET EVERY DAY  
  
 gabapentin 300 mg capsule Commonly known as:  NEURONTIN Take 300 mg by mouth three (3) times daily. guaiFENesin-codeine 100-10 mg/5 mL solution Commonly known as:  ROBITUSSIN AC Take 5 mL by mouth three (3) times daily as needed for Cough. Max Daily Amount: 15 mL.  
  
 latanoprost 0.005 % ophthalmic solution Commonly known as:  Amanda Mirza Administer 1 Drop to both eyes nightly. LEXAPRO 20 mg tablet Generic drug:  escitalopram oxalate Take 20 mg by mouth daily. melatonin 5 mg Cap capsule  
  
 metoprolol tartrate 25 mg tablet Commonly known as:  LOPRESSOR  
TAKE 1 TABLET TWICE DAILY predniSONE 20 mg tablet Commonly known as:  Annemarie Matt Take 1 Tab by mouth daily (with breakfast). simvastatin 40 mg tablet Commonly known as:  ZOCOR  
TAKE 1 TABLET EVERY NIGHT  
  
 triamcinolone 0.1 % lotion Commonly known as:  KENALOG Apply  to affected area as needed. use thin layer VENTOLIN HFA 90 mcg/actuation inhaler Generic drug:  albuterol Prescriptions Printed Refills  
 guaiFENesin-codeine (ROBITUSSIN AC) 100-10 mg/5 mL solution 0 Sig: Take 5 mL by mouth three (3) times daily as needed for Cough. Max Daily Amount: 15 mL. Class: Print  Route: Oral  
 predniSONE (DELTASONE) 20 mg tablet 0  
 Sig: Take 1 Tab by mouth daily (with breakfast). Class: Print Route: Oral  
  
Introducing [facility] & HEALTH SERVICES! Negar Silva introduces Blippar patient portal. Now you can access parts of your medical record, email your doctor's office, and request medication refills online. 1. In your internet browser, go to [email or web address] 2. Click on the First Time User? Click Here link in the Sign In box. You will see the New Member Sign Up page. 3. Enter your Blippar Access Code exactly as it appears below. You will not need to use this code after youve completed the sign-up process. If you do not sign up before the expiration date, you must request a new code. · Blippar Access Code: [email or web address] Expires: 5/27/2018  7:58 PM 
 
4. Enter the last four digits of your Social Security Number (xxxx) and Date of Birth (mm/dd/yyyy) as indicated and click Submit. You will be taken to the next sign-up page. 5. Create a Blippar ID. This will be your Blippar login ID and cannot be changed, so think of one that is secure and easy to remember. 6. Create a Blippar password. You can change your password at any time. 7. Enter your Password Reset Question and Answer. This can be used at a later time if you forget your password. 8. Enter your e-mail address. You will receive e-mail notification when new information is available in [address] 9. Click Sign Up. You can now view and download portions of your medical record. 10. Click the Download Summary menu link to download a portable copy of your medical information. If you have questions, please visit the Frequently Asked Questions section of the Blippar website. Remember, Blippar is NOT to be used for urgent needs. For medical emergencies, dial 911. Now available from your iPhone and Android! Please provide this summary of care documentation to your next provider. Your primary care clinician is listed as Shekhar Rebollar. If you have any questions after today's visit, please call [phone].

## 2018-03-05 NOTE — PROGRESS NOTES
Chief Complaint   Patient presents with    Follow-up     on the flu    Rash     1. Have you been to the ER, urgent care clinic since your last visit? Hospitalized since your last visit? No    2. Have you seen or consulted any other health care providers outside of the [address] [place] [address] since your last visit? Include any pap smears or colon screening.  No

## 2018-06-06 ENCOUNTER — OFFICE VISIT (OUTPATIENT)
Dept: CARDIOLOGY CLINIC | Age: 73
End: 2018-06-06

## 2018-06-06 VITALS
SYSTOLIC BLOOD PRESSURE: 124 MMHG | HEIGHT: 65 IN | OXYGEN SATURATION: 97 % | RESPIRATION RATE: 16 BRPM | HEART RATE: 62 BPM | BODY MASS INDEX: 21.29 KG/M2 | DIASTOLIC BLOOD PRESSURE: 66 MMHG | WEIGHT: 127.8 LBS

## 2018-06-06 DIAGNOSIS — I10 HYPERTENSION, ESSENTIAL: ICD-10-CM

## 2018-06-06 DIAGNOSIS — I25.10 ATHEROSCLEROSIS OF NATIVE CORONARY ARTERY OF NATIVE HEART WITHOUT ANGINA PECTORIS: Primary | ICD-10-CM

## 2018-06-06 DIAGNOSIS — J43.8 OTHER EMPHYSEMA (HCC): ICD-10-CM

## 2018-06-06 DIAGNOSIS — E78.2 MIXED HYPERLIPIDEMIA: ICD-10-CM

## 2018-06-06 RX ORDER — CITALOPRAM 20 MG/1
TABLET, FILM COATED ORAL DAILY
COMMUNITY

## 2018-06-06 RX ORDER — ASPIRIN 81 MG/1
TABLET ORAL DAILY
COMMUNITY

## 2018-06-06 NOTE — PROGRESS NOTES
Scott Lowe     1945       Vipal K. Ival Nissen MD, [facility]  Date of Visit-6/6/2018   PCP is Maximo Salmeron MD   [facility] [place]  [facility]  HPI:  Scott Lowe is a 74 y.o. female   Follow up of CAD and hypertension. Now notes chest pain and shortness of breath. Her dermatologist felt that she may have had a reaction to lisinopril and on Feb 28 we suggested that she stop lisinopril and start amlodipine 5 mg daily. BP today is normal.     The pt states that she has been getting some slight chest discomfort. This lasts for a few seconds and she has had this in the past. She states that this pain does not occur with exertion. The pt states that stopping the lisinopril did not clear up her allergic reaction. She did not restart lisinopril and is still taking amlodipine. The pt's rash/allergic reaction was from lamictal. She is not diabetic. She states that her shortness of breath is at baseline. Denies edema, syncope, has no tachycardia, palpitations or sense of arrhythmia. EKG: NSR WNL normal axis and intervals     Assessment/Plan:     1. CAD native, proximal LAD stent in 2014. Current pain in minimal and atypical.   EKG is normal  2. Hypertension well controlled even with change to norvasc, there is no rash with ACE. 3. Dyslipidemia on statin stable    4. COPD mild, stable     5. Follow up in 6 months. Future Appointments  Date Time Provider Calista Bardales   12/19/2018 9:20 AM Author MD [facility]      Vick CAD CHF Meds             aspirin delayed-release 81 mg tablet  (Taking) Take  by mouth daily. amLODIPine (NORVASC) 5 mg tablet  (Taking) Take 1 Tab by mouth daily. metoprolol tartrate (LOPRESSOR) 25 mg tablet  (Taking) TAKE 1 TABLET TWICE DAILY    clopidogrel (PLAVIX) 75 mg tab  (Taking) TAKE 1 TABLET EVERY DAY    simvastatin (ZOCOR) 40 mg tablet  (Taking) TAKE 1 TABLET EVERY NIGHT           Impression:   1. Atherosclerosis of native coronary artery of native heart without angina pectoris    2. Hypertension, essential    3. Mixed hyperlipidemia    4. Other emphysema ([facility]       Cardiac History:   NUKE 3-1-13= walked 5 minutes, STT 3 mm horizontal, normal perfusion, ef 70%  ECHO 3-1-13= 55-60%, mild MR,AR,TR, Aortic sclerosis without stenosis  Cardiac Cath 1-8-14-Successful GORDON pLAD (2.75x18 Xience). RFA mynx, RFV manual.     ROS-except as noted above. . A complete cardiac and respiratory are reviewed and negative except as above ; Resp-denies wheezing  or productive cough,. Const- No unusual weight loss or fever; Neuro-no recent seizure or CVA ; GI- No BRBPR, abdom pain, bloating ; - no  hematuria   see supplement sheet, initialed and to be scanned by staff  Past Medical History:   Diagnosis Date    Anxiety     Arthritis     CAD (coronary artery disease)     COPD (chronic obstructive pulmonary disease) ([facility] 1/7/2016    Coronary atherosclerosis of native coronary artery 2006    MI/Left ventricular wall motion is segmentally abnormal with mild hypokinesis of the anterior wall. Ejection Fraction is estimated at 50%. LAD >90% stenosis. LCX 50% stenosis.  Depression     Elevated BP 2/1/2017    Family history of suicide     father    Fatigue     GERD (gastroesophageal reflux disease)     Glaucoma     Headache 1988    migraines    Hypertension, essential 11/1/2017    Incontinence in female     Lung nodule     Mixed hyperlipidemia     Osteopenia 2015    DEXA 2010, 2015    S/P angioplasty with stent     stent to LAD. 3X8mm Cypher. Social Hx= reports that she has never smoked. She has never used smokeless tobacco. She reports that she does not drink alcohol or use illicit drugs. Exam and Labs:  /66 (BP 1 Location: Left arm)  Pulse 62  Resp 16  Ht 5' 5\" (1.651 m)  Wt 127 lb 12.8 oz (58 kg)  SpO2 97%  BMI 21.27 kg/i3Scfpksvnciujoi:  NAD, comfortable  Head: NC,AT.  Eyes: No scleral icterus. Neck:  Neck supple. No JVD present. Throat: moist mucous membranes. Chest: Effort normal & normal respiratory excursion . Neurological: alert, conversant and oriented . Skin: Skin is not cold. No obvious systemic rash noted. Not diaphoretic. No erythema. Psychiatric:  Grossly normal mood and affect. Behavior appears normal. Extremities:  no clubbing or cyanosis. Abdomen: non distended    Lungs:breath sounds normal. No stridor. distress, wheezes or  Rales. CUUGY:1/8 systolic murmur normal rate, regular rhythm, normal S1, S2, no rubs, clicks or gallops , PMI non displaced. Edema: Edema is none. Lab Results   Component Value Date/Time    Cholesterol, total 141 07/13/2015 09:17 AM    HDL Cholesterol 38 (L) 07/13/2015 09:17 AM    LDL, calculated 82 07/13/2015 09:17 AM    Triglyceride 104 07/13/2015 09:17 AM    CHOL/HDL Ratio 3.8 01/09/2014 04:20 AM     Lab Results   Component Value Date/Time    Sodium 141 05/09/2017 04:12 PM    Potassium 4.6 05/09/2017 04:12 PM    Chloride 99 05/09/2017 04:12 PM    CO2 27 05/09/2017 04:12 PM    Anion gap 7 03/09/2017 12:13 AM    Glucose 100 (H) 05/09/2017 04:12 PM    BUN 15 05/09/2017 04:12 PM    Creatinine 0.79 05/09/2017 04:12 PM    BUN/Creatinine ratio 19 05/09/2017 04:12 PM    GFR est AA 86 05/09/2017 04:12 PM    GFR est non-AA 74 05/09/2017 04:12 PM    Calcium 9.4 05/09/2017 04:12 PM      Wt Readings from Last 3 Encounters:   06/06/18 127 lb 12.8 oz (58 kg)   03/05/18 123 lb (55.8 kg)   02/26/18 127 lb (57.6 kg)      BP Readings from Last 3 Encounters:   06/06/18 124/66   03/05/18 149/61   02/26/18 154/60      Current Outpatient Prescriptions   Medication Sig    aspirin delayed-release 81 mg tablet Take  by mouth daily.  citalopram (CELEXA) 20 mg tablet Take  by mouth daily.  amLODIPine (NORVASC) 5 mg tablet Take 1 Tab by mouth daily.     metoprolol tartrate (LOPRESSOR) 25 mg tablet TAKE 1 TABLET TWICE DAILY    clopidogrel (PLAVIX) 75 mg tab TAKE 1 TABLET EVERY DAY    simvastatin (ZOCOR) 40 mg tablet TAKE 1 TABLET EVERY NIGHT    calcium-cholecalciferol, d3, (CALCIUM 600 + D) 600-125 mg-unit tab Take 1 Tab by mouth two (2) times a day.  latanoprost (XALATAN) 0.005 % ophthalmic solution Administer 1 Drop to both eyes nightly. No current facility-administered medications for this visit. Impression see above.       Written by Sandy Ervin, as dictated by Kian Fishman MD.

## 2018-06-06 NOTE — MR AVS SNAPSHOT
[address] [facility] [address] [phone] Patient: Jonathan Reyes MRN: [id number] ICE:[id number] Visit Information Date & Time Provider Department Dept. Phone Encounter #  
 6/6/2018  9:40 AM Rock Reynolds MD [facility] [phone] [id number] Your Appointments 12/19/2018  9:20 AM  
ESTABLISHED PATIENT with Rock Reynolds MD  
[facility] ([facility] SCHEDULING) Appt Note: 6 month f/u  
 [address] [facility] [address] Upcoming Health Maintenance Date Due DTaP/Tdap/Td series (1 - Tdap) 10/31/1964 FOBT Q 1 YEAR AGE 50-75 10/31/1993 ZOSTER VACCINE AGE 60> 8/31/2003 GLAUCOMA SCREENING Q2Y 10/31/2008 Pneumococcal 65+ Low/Medium Risk (1 of 2 - PCV13) 10/31/2008 MEDICARE YEARLY EXAM 3/14/2018 Influenza Age 5 to Adult 8/1/2018 BREAST CANCER SCRN MAMMOGRAM 8/30/2019 Allergies as of 6/6/2018  Review Complete On: 6/6/2018 By: Radha Russo Severity Noted Reaction Type Reactions Lamictal [Lamotrigine] High 03/08/2017    Anaphylaxis Lamictal [Lamotrigine]  01/23/2018    Rash, Itching Current Immunizations  Never Reviewed Name Date Influenza Vaccine 10/14/2015 Not reviewed this visit You Were Diagnosed With   
  
 Codes Comments Atherosclerosis of native coronary artery of native heart without angina pectoris    -  Primary ICD-10-CM: I25.10 ICD-9-CM: 414.01 Hypertension, essential     ICD-10-CM: I10 
ICD-9-CM: 401.9 Mixed hyperlipidemia     ICD-10-CM: E78.2 ICD-9-CM: 272.2 Other emphysema ([facility]     ICD-10-CM: J43.8 ICD-9-CM: 492.8 Vitals BP Pulse Resp Height(growth percentile) Weight(growth percentile) SpO2 124/66 (BP 1 Location: Left arm) 62 16 5' 5\" (1.651 m) 127 lb 12.8 oz (58 kg) 97% BMI OB Status Smoking Status 21.27 kg/m2 Postmenopausal Never Smoker Vitals History BMI and BSA Data Body Mass Index Body Surface Area  
 21.27 kg/m 2 1.63 m 2 Preferred Pharmacy Pharmacy Name Phone [facility] [address] [place], [place] [address] [facility] [address] [place] [address] [phone] Your Updated Medication List  
  
   
This list is accurate as of 6/6/18 10:38 AM.  Always use your most recent med list. amLODIPine 5 mg tablet Commonly known as:  Denise Pall Take 1 Tab by mouth daily. aspirin delayed-release 81 mg tablet Take  by mouth daily. CALCIUM 600 + D 600-125 mg-unit Tab Generic drug:  calcium-cholecalciferol (d3) Take 1 Tab by mouth two (2) times a day. CeleXA 20 mg tablet Generic drug:  citalopram  
Take  by mouth daily. clopidogrel 75 mg Tab Commonly known as:  PLAVIX TAKE 1 TABLET EVERY DAY  
  
 latanoprost 0.005 % ophthalmic solution Commonly known as:  Adelbert Holding Administer 1 Drop to both eyes nightly. metoprolol tartrate 25 mg tablet Commonly known as:  LOPRESSOR  
TAKE 1 TABLET TWICE DAILY  
  
 simvastatin 40 mg tablet Commonly known as:  ZOCOR  
TAKE 1 TABLET EVERY NIGHT We Performed the Following AMB POC EKG ROUTINE W/ 12 LEADS, INTER & REP [25098 CPT(R)] Patient Instructions You will need to follow up in clinic with Dr. Penny Bennett in 6 months. Introducing [facility] & HEALTH SERVICES! [employer] introduces [employer] patient portal. Now you can access parts of your medical record, email your doctor's office, and request medication refills online. 1. In your internet browser, go to [email or web address] 2. Click on the First Time User? Click Here link in the Sign In box. You will see the New Member Sign Up page. 3. Enter your T[employer] Access Code exactly as it appears below. You will not need to use this code after youve completed the sign-up process. If you do not sign up before the expiration date, you must request a new code. · [employer] Access Code: [email or web address] Expires: 9/2/2018  8:41 AM 
 
4. Enter the last four digits of your Social Security Number (xxxx) and Date of Birth (mm/dd/yyyy) as indicated and click Submit. You will be taken to the next sign-up page. 5. Create a [employer] ID. This will be your [employer] login ID and cannot be changed, so think of one that is secure and easy to remember. 6. Create a [employer] password. You can change your password at any time. 7. Enter your Password Reset Question and Answer. This can be used at a later time if you forget your password. 8. Enter your e-mail address. You will receive e-mail notification when new information is available in [address] 9. Click Sign Up. You can now view and download portions of your medical record. 10. Click the Download Summary menu link to download a portable copy of your medical information. If you have questions, please visit the Frequently Asked Questions section of the [employer] website. Remember, [employer] is NOT to be used for urgent needs. For medical emergencies, dial 911. Now available from your iPhone and Android! Please provide this summary of care documentation to your next provider. Your primary care clinician is listed as Dain Chan. If you have any questions after today's visit, please call [phone].

## 2018-06-27 DIAGNOSIS — I10 HYPERTENSION, ESSENTIAL: ICD-10-CM

## 2018-06-27 DIAGNOSIS — I25.10 ATHEROSCLEROSIS OF NATIVE CORONARY ARTERY OF NATIVE HEART WITHOUT ANGINA PECTORIS: Primary | ICD-10-CM

## 2018-06-27 DIAGNOSIS — E78.2 MIXED HYPERLIPIDEMIA: ICD-10-CM

## 2018-07-11 RX ORDER — METOPROLOL TARTRATE 25 MG/1
TABLET, FILM COATED ORAL
Qty: 180 TAB | Refills: 1 | Status: SHIPPED | OUTPATIENT
Start: 2018-07-11 | End: 2018-11-14 | Stop reason: SDUPTHER

## 2018-07-11 RX ORDER — AMLODIPINE BESYLATE 5 MG/1
5 TABLET ORAL DAILY
Qty: 90 TAB | Refills: 1 | Status: SHIPPED | OUTPATIENT
Start: 2018-07-11 | End: 2018-07-31 | Stop reason: SDUPTHER

## 2018-07-11 RX ORDER — CLOPIDOGREL BISULFATE 75 MG/1
TABLET ORAL
Qty: 90 TAB | Refills: 1 | Status: SHIPPED | OUTPATIENT
Start: 2018-07-11 | End: 2018-07-31 | Stop reason: SDUPTHER

## 2018-07-11 RX ORDER — SIMVASTATIN 40 MG/1
TABLET, FILM COATED ORAL
Qty: 90 TAB | Refills: 1 | Status: SHIPPED | OUTPATIENT
Start: 2018-07-11 | End: 2018-11-14 | Stop reason: SDUPTHER

## 2018-07-11 NOTE — TELEPHONE ENCOUNTER
Requested Prescriptions     Signed Prescriptions Disp Refills    simvastatin (ZOCOR) 40 mg tablet 90 Tab 1     Sig: TAKE 1 TABLET EVERY NIGHT     Authorizing Provider: Ishaan Ball     Ordering User: James Villa    metoprolol tartrate (LOPRESSOR) 25 mg tablet 180 Tab 1     Sig: TAKE 1 TABLET TWICE DAILY     Authorizing Provider: Ishaan Ball     Ordering User: Astrid Leavitt clopidogrel (PLAVIX) 75 mg tab 90 Tab 1     Sig: TAKE 1 TABLET EVERY DAY     Authorizing Provider: Ishaan Ball     Ordering User: James Villa    amLODIPine (NORVASC) 5 mg tablet 90 Tab 1     Sig: Take 1 Tab by mouth daily.      Authorizing Provider: Ishaan Ness User: James Villa       Per Dr. Juan C Jane  Date Time Provider [facility]   12/19/2018 9:20 AM Elisabet Parekh MD [address] [place] [address]

## 2018-07-31 DIAGNOSIS — I25.10 ATHEROSCLEROSIS OF NATIVE CORONARY ARTERY OF NATIVE HEART WITHOUT ANGINA PECTORIS: ICD-10-CM

## 2018-07-31 DIAGNOSIS — E78.2 MIXED HYPERLIPIDEMIA: ICD-10-CM

## 2018-07-31 DIAGNOSIS — I10 HYPERTENSION, ESSENTIAL: ICD-10-CM

## 2018-07-31 RX ORDER — AMLODIPINE BESYLATE 5 MG/1
5 TABLET ORAL DAILY
Qty: 90 TAB | Refills: 1 | Status: SHIPPED | OUTPATIENT
Start: 2018-07-31 | End: 2019-04-03 | Stop reason: SDUPTHER

## 2018-07-31 RX ORDER — CLOPIDOGREL BISULFATE 75 MG/1
TABLET ORAL
Qty: 90 TAB | Refills: 1 | Status: SHIPPED | OUTPATIENT
Start: 2018-07-31 | End: 2019-04-03 | Stop reason: SDUPTHER

## 2018-07-31 NOTE — TELEPHONE ENCOUNTER
Requested Prescriptions     Signed Prescriptions Disp Refills    clopidogrel (PLAVIX) 75 mg tab 90 Tab 1     Sig: TAKE 1 TABLET EVERY DAY     Authorizing Provider: Dung KOTHARI     Ordering User: Sarah Benitez    amLODIPine (NORVASC) 5 mg tablet 90 Tab 1     Sig: Take 1 Tab by mouth daily.      Authorizing Provider: Brianne Lyons     Ordering User: Sarah Benitez    Per Dr. Chloe Olguin verbal order

## 2018-07-31 NOTE — TELEPHONE ENCOUNTER
Pt called requesting a status update on refill request.  Pharmacy confirmed  Phone #[phone]  Thanks

## 2018-10-06 ENCOUNTER — HOSPITAL ENCOUNTER (OUTPATIENT)
Dept: MAMMOGRAPHY | Age: 73
Discharge: HOME | End: 2018-10-06
Attending: FAMILY MEDICINE
Payer: MEDICARE

## 2018-10-06 DIAGNOSIS — Z12.39 SCREENING BREAST EXAMINATION: ICD-10-CM

## 2018-10-06 PROCEDURE — 77067 SCR MAMMO BI INCL CAD: CPT

## 2018-11-14 DIAGNOSIS — I10 HYPERTENSION, ESSENTIAL: ICD-10-CM

## 2018-11-14 DIAGNOSIS — E78.2 MIXED HYPERLIPIDEMIA: ICD-10-CM

## 2018-11-15 RX ORDER — SIMVASTATIN 40 MG/1
TABLET, FILM COATED ORAL
Qty: 90 TAB | Refills: 0 | Status: SHIPPED | OUTPATIENT
Start: 2018-11-15 | End: 2019-01-21 | Stop reason: SDUPTHER

## 2018-11-15 RX ORDER — METOPROLOL TARTRATE 25 MG/1
TABLET, FILM COATED ORAL
Qty: 180 TAB | Refills: 0 | Status: SHIPPED | OUTPATIENT
Start: 2018-11-15 | End: 2019-01-21 | Stop reason: SDUPTHER

## 2018-11-15 NOTE — TELEPHONE ENCOUNTER
Requested Prescriptions     Signed Prescriptions Disp Refills    simvastatin (ZOCOR) 40 mg tablet 90 Tab 0     Sig: TAKE 1 TABLET EVERY NIGHT     Authorizing Provider: Ayaz Lowe     Ordering User: Cass Martinez    metoprolol tartrate (LOPRESSOR) 25 mg tablet 180 Tab 0     Sig: TAKE 1 TABLET TWICE DAILY     Authorizing Provider: Ayaz Lowe     Ordering User: Cass Martinez    per Dr. Coronel Master verbal order.       Future Appointments   Date Time Provider Calista Reesi   12/19/2018  9:20 AM Justine Brush MD [address] [place] [address]

## 2018-12-17 ENCOUNTER — OFFICE VISIT (OUTPATIENT)
Dept: FAMILY MEDICINE CLINIC | Age: 73
End: 2018-12-17

## 2018-12-17 VITALS
HEIGHT: 65 IN | TEMPERATURE: 98.5 F | SYSTOLIC BLOOD PRESSURE: 129 MMHG | HEART RATE: 70 BPM | RESPIRATION RATE: 16 BRPM | WEIGHT: 123 LBS | BODY MASS INDEX: 20.49 KG/M2 | DIASTOLIC BLOOD PRESSURE: 47 MMHG | OXYGEN SATURATION: 97 %

## 2018-12-17 DIAGNOSIS — J02.9 SORE THROAT: ICD-10-CM

## 2018-12-17 DIAGNOSIS — J40 BRONCHITIS: Primary | ICD-10-CM

## 2018-12-17 LAB
S PYO AG THROAT QL: NEGATIVE
VALID INTERNAL CONTROL?: YES

## 2018-12-17 RX ORDER — BENZONATATE 200 MG/1
200 CAPSULE ORAL
Qty: 30 CAP | Refills: 0 | Status: SHIPPED | OUTPATIENT
Start: 2018-12-17 | End: 2019-07-24

## 2018-12-17 NOTE — PROGRESS NOTES
Chief Complaint   Patient presents with    Cough     off and on times one month    Headache    Sore Throat     1. Have you been to the ER, urgent care clinic since your last visit? Hospitalized since your last visit? No    2. Have you seen or consulted any other health care providers outside of the [address] [place] [address] since your last visit? Include any pap smears or colon screening.  No

## 2018-12-17 NOTE — PROGRESS NOTES
Subjective:   Michelle Espino is a 74 y.o. female with history of osteopenia, HLD, HTN, GERD, Depression, CAD  CC: Cough  History provided by patient and Records    HPI:  Starting about a month ago developed Viral URI nasal congestion with cough, fatigue, muscle aches. Cough persisted and developed dry cough paroxysms with near spit-up. Symptoms worse at night and preventing sleep. Patient tried nyquil with some improvement. Reports over the last week new sore throat and exacerbated cough, 2 grandsons are living with her. Patient without a history of asthma or COPD, (Told \"Near COPD\")  Patient denies fevers or chills, myalgias, productive cough. PFSH:     Current Outpatient Medications on File Prior to Visit   Medication Sig Dispense Refill    simvastatin (ZOCOR) 40 mg tablet TAKE 1 TABLET EVERY NIGHT 90 Tab 0    metoprolol tartrate (LOPRESSOR) 25 mg tablet TAKE 1 TABLET TWICE DAILY 180 Tab 0    clopidogrel (PLAVIX) 75 mg tab TAKE 1 TABLET EVERY DAY 90 Tab 1    amLODIPine (NORVASC) 5 mg tablet Take 1 Tab by mouth daily. 90 Tab 1    aspirin delayed-release 81 mg tablet Take  by mouth daily.  citalopram (CELEXA) 20 mg tablet Take  by mouth daily.  calcium-cholecalciferol, d3, (CALCIUM 600 + D) 600-125 mg-unit tab Take 1 Tab by mouth two (2) times a day.  latanoprost (XALATAN) 0.005 % ophthalmic solution Administer 1 Drop to both eyes nightly. No current facility-administered medications on file prior to visit.         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    Somatization disorder F45.0    Posttraumatic stress disorder F43.10    Depression F32.9    Fracture of femoral neck, right ([facility] [address] S72.001A    Osteoporosis with pathological fracture M80.00XA    COPD [facility]ch[facility] obstructive [facility] disease) [facility] J44.9    Hypertension, essential I10    Coronary artery disease involving native coronary artery of native heart without angina pectoris I25.10       Social History     Socioeconomic History    Marital status:      Spouse name: Not on file    Number of children: Not on file    Years of education: Not on file    Highest education level: Not on file   Social Needs    Financial resource strain: Not on file    Food insecurity - worry: Not on file    Food insecurity - inability: Not on file    Transportation needs - medical: Not on file   [employer] needs - non-medical: Not on file   Occupational History    Not on file   Tobacco Use    Smoking status: Never Smoker    Smokeless tobacco: Never Used   Substance and Sexual Activity    Alcohol use: No     Alcohol/week: 0.0 oz    Drug use: No    Sexual activity: No   Other Topics Concern    Not on file   Social History Narrative    ** Merged History Encounter **            ROS      Objective:     Visit Vitals  /47   Pulse 70   Temp 98.5 °F (36.9 °C) (Oral)   Resp 16   Ht 5' 5\" (1.651 m)   Wt 123 lb (55.8 kg)   SpO2 97%   BMI 20.47 kg/m²          Physical Exam   Constitutional: She is oriented to person, place, and time. She appears well-developed and well-nourished. No distress. HENT:   Head: Normocephalic and atraumatic. Right Ear: Tympanic membrane and ear canal normal.   Left Ear: Tympanic membrane and ear canal normal.   Nose: No mucosal edema or rhinorrhea. Right sinus exhibits no maxillary sinus tenderness and no frontal sinus tenderness. Left sinus exhibits no maxillary sinus tenderness and no frontal sinus tenderness. Mouth/Throat: Oropharynx is clear and moist and mucous membranes are normal.   Neck: Normal range of motion. Neck supple. Cardiovascular: Normal rate, regular rhythm, normal heart sounds and intact distal pulses. Exam reveals no gallop and no friction rub. No murmur heard.   Pulmonary/Chest: Effort normal and breath sounds normal. She has no wheezes. Abdominal: Soft. Bowel sounds are normal.   Lymphadenopathy:     She has no cervical adenopathy. Neurological: She is alert and oriented to person, place, and time. Skin: Skin is warm and dry. Nursing note and vitals reviewed. Pertinent Labs/Studies:  POC Strep Negative    Assessment and orders:       ICD-10-CM ICD-9-CM    1. Bronchitis J40 490 benzonatate (TESSALON) 200 mg capsule   2. Sore throat J02.9 462 AMB POC RAPID STREP A     Diagnoses and all orders for this visit:    1. Bronchitis: History consistent with Bronchitis. No actual COPD diagnosis. Advised on OTC therapies and likely time course of Bronchitis/cough. Also trial of Tessalon for cough suppression, particularly at night. -     benzonatate (TESSALON) 200 mg capsule; Take 1 Cap by mouth three (3) times daily as needed for Cough. 2. Sore throat: POC step Negative  -     AMB POC RAPID STREP A      Follow-up Disposition:  Return if symptoms worsen or fail to improve. I have discussed the diagnosis with the patient and the intended plan as seen in the above orders. Social history, medical history, and labs were reviewed. The patient has received an after-visit summary and questions were answered concerning future plans. I have discussed medication side effects and warnings with the patient as well.     Kerri Yepze MD  Resident [facility]  12/17/18    Patient discussed with Dr. Kevin Quinonez, Attending Physician

## 2018-12-17 NOTE — PATIENT INSTRUCTIONS
Bronchitis: Care Instructions  Your Care Instructions    Bronchitis is inflammation of the bronchial tubes, which carry air to the lungs. The tubes swell and produce mucus, or phlegm. The mucus and inflamed bronchial tubes make you cough. You may have trouble breathing. Most cases of bronchitis are caused by viruses like those that cause colds. Antibiotics usually do not help and they may be harmful. Bronchitis usually develops rapidly and lasts about 2 to 3 weeks in otherwise healthy people. Follow-up care is a key part of your treatment and safety. Be sure to make and go to all appointments, and call your doctor if you are having problems. It's also a good idea to know your test results and keep a list of the medicines you take. How can you care for yourself at home? · Take all medicines exactly as prescribed. Call your doctor if you think you are having a problem with your medicine. · Get some extra rest.  · Take an over-the-counter pain medicine, such as acetaminophen (Tylenol), ibuprofen (Advil, Motrin), or naproxen (Aleve) to reduce fever and relieve body aches. Read and follow all instructions on the label. · Do not take two or more pain medicines at the same time unless the doctor told you to. Many pain medicines have acetaminophen, which is Tylenol. Too much acetaminophen (Tylenol) can be harmful. · Take an over-the-counter cough medicine that contains dextromethorphan to help quiet a dry, hacking cough so that you can sleep. Avoid cough medicines that have more than one active ingredient. Read and follow all instructions on the label. · Breathe moist air from a humidifier, hot shower, or sink filled with hot water. The heat and moisture will thin mucus so you can cough it out. · Do not smoke. Smoking can make bronchitis worse. If you need help quitting, talk to your doctor about stop-smoking programs and medicines. These can increase your chances of quitting for good.   When should you call for help? Call 911 anytime you think you may need emergency care. For example, call if:    · You have severe trouble breathing.    Call your doctor now or seek immediate medical care if:    · You have new or worse trouble breathing.     · You cough up dark brown or bloody mucus (sputum).     · You have a new or higher fever.     · You have a new rash.    Watch closely for changes in your health, and be sure to contact your doctor if:    · You cough more deeply or more often, especially if you notice more mucus or a change in the color of your mucus.     · You are not getting better as expected. Where can you learn more? Go to http://atnya-mk.info/. Enter H333 in the search box to learn more about \"Bronchitis: Care Instructions. \"  Current as of: December 6, 2017  Content Version: 11.8  © 8090-0962 [employer]. Care instructions adapted under license by [employer] (which disclaims liability or warranty for this information). If you have questions about a medical condition or this instruction, always ask your healthcare professional. [address] any warranty or liability for your use of this information.

## 2018-12-26 ENCOUNTER — OFFICE VISIT (OUTPATIENT)
Dept: CARDIOLOGY CLINIC | Age: 73
End: 2018-12-26

## 2018-12-26 VITALS
DIASTOLIC BLOOD PRESSURE: 56 MMHG | HEART RATE: 64 BPM | SYSTOLIC BLOOD PRESSURE: 112 MMHG | HEIGHT: 65 IN | BODY MASS INDEX: 20.69 KG/M2 | RESPIRATION RATE: 16 BRPM | OXYGEN SATURATION: 95 % | WEIGHT: 124.2 LBS

## 2018-12-26 DIAGNOSIS — I25.10 CORONARY ARTERY DISEASE INVOLVING NATIVE CORONARY ARTERY OF NATIVE HEART WITHOUT ANGINA PECTORIS: Primary | ICD-10-CM

## 2018-12-26 DIAGNOSIS — E78.2 MIXED HYPERLIPIDEMIA: ICD-10-CM

## 2018-12-26 DIAGNOSIS — J44.9 CHRONIC OBSTRUCTIVE PULMONARY DISEASE, UNSPECIFIED COPD TYPE (HCC): ICD-10-CM

## 2018-12-26 DIAGNOSIS — I10 HYPERTENSION, ESSENTIAL: ICD-10-CM

## 2018-12-26 RX ORDER — CEFDINIR 300 MG/1
300 CAPSULE ORAL 2 TIMES DAILY
COMMUNITY
End: 2019-07-22 | Stop reason: ALTCHOICE

## 2018-12-26 NOTE — PROGRESS NOTES
Ricco Pinzon     1945       Ofelia Mendoza MD, [facility]  Date of Visit-12/26/2018   PCP is Marce Griffith MD   [facility] [place]  [facility]  HPI:  Ricco Pinzon is a 74 y.o. female   F/u of CAD and HTN -6 month visit. Overall the pt states she is doing well despite having bronchitis for 2 weeks. Pt's breathing prior to bronchitis has been fine. Denies chest pain, edema, syncope or shortness of breath at rest, has no tachycardia, palpitations or sense of arrhythmia. Assessment/Plan:     1. CAD native. Prior cath 2006 with LAD stenosis cath 2007 with LAD proximal GORDON. Normal EF at that time. Currently pain free with appropriate medicine since then. Continue DAPT unless there is bleeding complications. 2. Dyslipidemia on statin. Lab Results   Component Value Date/Time    LDL, calculated 82 07/13/2015 09:17 AM     3. HTN. Well controlled. 4. COPD with recent bronchitis. 5. Clinically stable doing well, will see yearly. Has not had stress test in few years so it's appropriate to get a stress echo and she can schedule it at her own convenience. Future Appointments   Date Time Provider Calista Bardales   1/15/2019  3:00 PM SONIDO CEVALLOS [facility]IL[facility] SCHED   1/15/2019  3:00 PM SANGITA JONES [facility] SCHED   1/15/2020  9:20 AM Ofelia Ocampo MD [facility] LILIANA SCHED         Impression:   1. Coronary artery disease involving native coronary artery of native heart without angina pectoris    2. Mixed hyperlipidemia    3. Hypertension, essential    4. Chronic obstructive pulmonary disease, unspecified COPD type ([facility] [address] [facility]       Cardiac History:   NUKE 3-1-13= walked 5 minutes, STT 3 mm horizontal, normal perfusion, ef 70%  ECHO 3-1-13= 55-60%, mild MR,AR,TR, Aortic sclerosis without stenosis  Cardiac Cath 1-8-14-Successful GORDON pLAD (2.75x18 Xience). RFA mynx, RFV manual.     ROS-except as noted above. . A complete cardiac and respiratory are reviewed and negative except as above ; Resp-denies wheezing  or productive cough,. Const- No unusual weight loss or fever; Neuro-no recent seizure or CVA ; GI- No BRBPR, abdom pain, bloating ; - no  hematuria   see supplement sheet, initialed and to be scanned by staff  Past Medical History:   Diagnosis Date    Anxiety     Arthritis     CAD (coronary artery disease)     COPD (chronic obstructive pulmonary disease) ([facility] 1/7/2016    Coronary atherosclerosis of native coronary artery 2006    MI/Left ventricular wall motion is segmentally abnormal with mild hypokinesis of the anterior wall. Ejection Fraction is estimated at 50%. LAD >90% stenosis. LCX 50% stenosis.  Depression     Elevated BP 2/1/2017    Family history of suicide     father    Fatigue     GERD (gastroesophageal reflux disease)     Glaucoma     Headache 1988    migraines    Hypertension, essential 11/1/2017    Incontinence in female     Lung nodule     Menopause     Mixed hyperlipidemia     Osteopenia 2015    DEXA 2010, 2015    S/P angioplasty with stent     stent to LAD. 3X8mm [place]. Social Hx= reports that  has never smoked. she has never used smokeless tobacco. She reports that she does not drink alcohol or use drugs. Exam and Labs:  /56 (BP 1 Location: Left arm)   Pulse 64   Resp 16   Ht 5' 5\" (1.651 m)   Wt 124 lb 3.2 oz (56.3 kg)   SpO2 95%   BMI 20.67 kg/m² Constitutional:  NAD, comfortable  Head: NC,AT. Eyes: No scleral icterus. Neck:  Neck supple. No JVD present. Throat: moist mucous membranes. Chest: Effort normal & normal respiratory excursion . Neurological: alert, conversant and oriented . Skin: Skin is not cold. No obvious systemic rash noted. Not diaphoretic. No erythema. Psychiatric:  Grossly normal mood and affect. Behavior appears normal. Extremities:  no clubbing or cyanosis. Abdomen: non distended    Lungs:breath sounds normal. No stridor.  distress, wheezes or Rales.  Heart: normal rate, regular rhythm, normal S1, S2, no murmurs, rubs, clicks or gallops , PMI non displaced. Edema: Edema is none. Wt Readings from Last 3 Encounters:   12/26/18 124 lb 3.2 oz (56.3 kg)   12/17/18 123 lb (55.8 kg)   06/06/18 127 lb 12.8 oz (58 kg)      BP Readings from Last 3 Encounters:   12/26/18 112/56   12/17/18 129/47   06/06/18 124/66      Current Outpatient Medications   Medication Sig    cefdinir (OMNICEF) 300 mg capsule Take 300 mg by mouth two (2) times a day.  benzonatate (TESSALON) 200 mg capsule Take 1 Cap by mouth three (3) times daily as needed for Cough.  simvastatin (ZOCOR) 40 mg tablet TAKE 1 TABLET EVERY NIGHT    metoprolol tartrate (LOPRESSOR) 25 mg tablet TAKE 1 TABLET TWICE DAILY    clopidogrel (PLAVIX) 75 mg tab TAKE 1 TABLET EVERY DAY    amLODIPine (NORVASC) 5 mg tablet Take 1 Tab by mouth daily.  aspirin delayed-release 81 mg tablet Take  by mouth daily.  citalopram (CELEXA) 20 mg tablet Take  by mouth daily.  calcium-cholecalciferol, d3, (CALCIUM 600 + D) 600-125 mg-unit tab Take 1 Tab by mouth two (2) times a day.  latanoprost (XALATAN) 0.005 % ophthalmic solution Administer 1 Drop to both eyes nightly. No current facility-administered medications for this visit. Impression see above.       Written by Knute Schirmer, as dictated by Shayan Alexander MD.

## 2018-12-26 NOTE — PROGRESS NOTES
Visit Vitals  /56 (BP 1 Location: Left arm)   Pulse 64   Resp 16   Ht 5' 5\" (1.651 m)   Wt 124 lb 3.2 oz (56.3 kg)   SpO2 95%   BMI 20.67 kg/m²       Patient is here for follow up. C/o SOB. Currently recovering from bronchitis.

## 2019-01-15 ENCOUNTER — CLINICAL SUPPORT (OUTPATIENT)
Dept: CARDIOLOGY CLINIC | Age: 74
End: 2019-01-15

## 2019-01-15 DIAGNOSIS — I25.10 CORONARY ARTERY DISEASE INVOLVING NATIVE CORONARY ARTERY OF NATIVE HEART WITHOUT ANGINA PECTORIS: ICD-10-CM

## 2019-01-15 DIAGNOSIS — I10 HYPERTENSION, ESSENTIAL: ICD-10-CM

## 2019-01-18 NOTE — PROGRESS NOTES
Normal stress echo   Let her know  Future Appointments  1/15/2020  9:20 AM    Jet Ravi MD       [address] [place] [address],[address]

## 2019-01-21 DIAGNOSIS — I10 HYPERTENSION, ESSENTIAL: ICD-10-CM

## 2019-01-21 DIAGNOSIS — E78.2 MIXED HYPERLIPIDEMIA: ICD-10-CM

## 2019-01-22 RX ORDER — SIMVASTATIN 40 MG/1
TABLET, FILM COATED ORAL
Qty: 90 TAB | Refills: 3 | Status: SHIPPED | OUTPATIENT
Start: 2019-01-22 | End: 2019-11-11 | Stop reason: SDUPTHER

## 2019-01-22 RX ORDER — METOPROLOL TARTRATE 25 MG/1
TABLET, FILM COATED ORAL
Qty: 180 TAB | Refills: 3 | Status: SHIPPED | OUTPATIENT
Start: 2019-01-22 | End: 2019-11-11 | Stop reason: SDUPTHER

## 2019-01-22 NOTE — TELEPHONE ENCOUNTER
Request for simvastatin 40mg daily and metoprolol 25mg BID. Last office visit 12-26-18, next office visit 1-15-20.  Refills per verbal order from Dr. Mary Carrillo.

## 2019-04-03 DIAGNOSIS — E78.2 MIXED HYPERLIPIDEMIA: ICD-10-CM

## 2019-04-03 DIAGNOSIS — I25.10 ATHEROSCLEROSIS OF NATIVE CORONARY ARTERY OF NATIVE HEART WITHOUT ANGINA PECTORIS: ICD-10-CM

## 2019-04-03 DIAGNOSIS — I10 HYPERTENSION, ESSENTIAL: ICD-10-CM

## 2019-04-03 RX ORDER — AMLODIPINE BESYLATE 5 MG/1
5 TABLET ORAL DAILY
Qty: 90 TAB | Refills: 1 | Status: SHIPPED | OUTPATIENT
Start: 2019-04-03 | End: 2019-08-13 | Stop reason: SDUPTHER

## 2019-04-03 RX ORDER — CLOPIDOGREL BISULFATE 75 MG/1
TABLET ORAL
Qty: 90 TAB | Refills: 3 | Status: SHIPPED | OUTPATIENT
Start: 2019-04-03 | End: 2019-12-31

## 2019-04-03 NOTE — TELEPHONE ENCOUNTER
Request for plavix 75mg and norvasc 5mg daily. Last office visit 11-20-18, next office visit 5-21-19.  Refills per verbal order from Dr. Irina Mcgee.

## 2019-07-22 ENCOUNTER — OFFICE VISIT (OUTPATIENT)
Dept: FAMILY MEDICINE CLINIC | Age: 74
End: 2019-07-22

## 2019-07-22 VITALS
DIASTOLIC BLOOD PRESSURE: 44 MMHG | HEART RATE: 66 BPM | WEIGHT: 133 LBS | RESPIRATION RATE: 16 BRPM | BODY MASS INDEX: 22.16 KG/M2 | SYSTOLIC BLOOD PRESSURE: 106 MMHG | OXYGEN SATURATION: 96 % | TEMPERATURE: 98.1 F | HEIGHT: 65 IN

## 2019-07-22 DIAGNOSIS — J02.9 SORE THROAT: Primary | ICD-10-CM

## 2019-07-22 NOTE — PATIENT INSTRUCTIONS
START Fluticasone two sprays daily. 1-2 months. START Loratadine 10mg daily. Continue for 1-2 months. Rhinitis: Care Instructions  Your Care Instructions  Rhinitis is swelling and irritation in the nose. Allergies and infections are often the cause. Your nose may run or feel stuffy. Other symptoms are itchy and sore eyes, ears, throat, and mouth. If allergies are the cause, your doctor may do tests to find out what you are allergic to. You may be able to stop symptoms if you avoid the things that cause them. Your doctor may suggest or prescribe medicine to ease your symptoms. Follow-up care is a key part of your treatment and safety. Be sure to make and go to all appointments, and call your doctor if you are having problems. It's also a good idea to know your test results and keep a list of the medicines you take. How can you care for yourself at home? · If your rhinitis is caused by allergies, try to find out what sets off (triggers) your symptoms. Take steps to avoid these triggers. ? Avoid yard work. It can stir up both pollen and mold. ? Do not smoke or allow others to smoke around you. If you need help quitting, talk to your doctor about stop-smoking programs and medicines. These can increase your chances of quitting for good. ? Do not use aerosol sprays, cleaning products, or perfumes. ? If pollen is one of your triggers, close your house and car windows during blooming season. ? Clean your house often to control dust.  ? Keep pets outside. · If your doctor recommends over-the-counter medicines to relieve symptoms, take your medicines exactly as prescribed. Call your doctor if you think you are having a problem with your medicine. · Use saline (saltwater) nasal washes to help keep your nasal passages open and wash out mucus and bacteria. You can buy saline nose drops at a grocery store or drugstore.  Or you can make your own at home by adding 1 teaspoon of salt and 1 teaspoon of baking soda to 2 cups of distilled water. If you make your own, fill a bulb syringe with the solution, insert the tip into your nostril, and squeeze gently. [place] your nose. When should you call for help? Call your doctor now or seek immediate medical care if:    · You are having trouble breathing.    Watch closely for changes in your health, and be sure to contact your doctor if:    · Mucus from your nose gets thicker (like pus) or has new blood in it.     · You have new or worse symptoms.     · You do not get better as expected. Where can you learn more? Go to http://tanya-mk.info/. Enter M030 in the search box to learn more about \"Rhinitis: Care Instructions. \"  Current as of: October 21, 2018  Content Version: 12.1  © 8119-7198 [employer]. Care instructions adapted under license by [employer] (which disclaims liability or warranty for this information). If you have questions about a medical condition or this instruction, always ask your healthcare professional. [place] [address] any warranty or liability for your use of this information.

## 2019-07-22 NOTE — PROGRESS NOTES
Subjective  CC: Leonila Gupta is an 74 y.o. female presents for cough, headache, sore throat    Sore throat  Pt has had HA, cough, and sore throat x 3-4 days. Has tried tylenol for the headache. Reports that she is feeling drainage down the back of her throat. And the cough is non-productive. No Fever or chills, chest pain. Denies h/o seasonal allergies      Allergies - reviewed: Allergies   Allergen Reactions    Lamictal [Lamotrigine] Anaphylaxis    Lamictal [Lamotrigine] Rash and Itching         Medications - reviewed:   Current Outpatient Medications   Medication Sig    amLODIPine (NORVASC) 5 mg tablet Take 1 Tab by mouth daily.  clopidogrel (PLAVIX) 75 mg tab TAKE 1 TABLET EVERY DAY    metoprolol tartrate (LOPRESSOR) 25 mg tablet TAKE 1 TABLET TWICE DAILY    simvastatin (ZOCOR) 40 mg tablet TAKE 1 TABLET EVERY NIGHT    aspirin delayed-release 81 mg tablet Take  by mouth daily.  citalopram (CELEXA) 20 mg tablet Take  by mouth daily.  calcium-cholecalciferol, d3, (CALCIUM 600 + D) 600-125 mg-unit tab Take 1 Tab by mouth two (2) times a day.  latanoprost (XALATAN) 0.005 % ophthalmic solution Administer 1 Drop to both eyes nightly.  benzonatate (TESSALON) 200 mg capsule Take 1 Cap by mouth three (3) times daily as needed for Cough. No current facility-administered medications for this visit. Past Medical History - reviewed:  Past Medical History:   Diagnosis Date    Anxiety     Arthritis     CAD (coronary artery disease)     COPD (chronic obstructive pulmonary disease) ([facility] [address] 1/7/2016    Coronary atherosclerosis of native coronary artery 2006    MI/Left ventricular wall motion is segmentally abnormal with mild hypokinesis of the anterior wall. Ejection Fraction is estimated at 50%. LAD >90% stenosis. LCX 50% stenosis.      Depression     Elevated BP 2/1/2017    Family history of suicide     father    Fatigue     GERD (gastroesophageal reflux disease)     Glaucoma     Headache 1988    migraines    Hypertension, essential 11/1/2017    Incontinence in female     Lung nodule     Menopause     Mixed hyperlipidemia     Osteopenia 2015    DEXA 2010, 2015    S/P angioplasty with stent     stent to LAD. 3X8mm Cypher. Immunizations - reviewed:   Immunization History   Administered Date(s) Administered    Influenza Vaccine 10/14/2015         ROS  Review of Systems : A review of systems was performed. All pertinent negatives and positives are mentioned in the HPI. Physical Exam  Visit Vitals  /44 (BP 1 Location: Left arm, BP Patient Position: Sitting)   Pulse 66   Temp 98.1 °F (36.7 °C) (Oral)   Resp 16   Ht 5' 5\" (1.651 m)   Wt 133 lb (60.3 kg)   SpO2 96%   BMI 22.13 kg/m²       General appearance - Alert, NAD. Head: Atraumatic. Normocephalic. No lymphadenopathy  Eyes: EOMI. Sclera white. Ears: Hearing grossly normal. TM non erythematous with no effusion   Nose: Nares patent, no polyps. Pale turbinates. Clear drainage. Neck: No cervical lymphadenopathy or goiter pesent  Throat: pharynx clear, no exudates. Cobblestoning of the posterior pharynx  Respiratory - LCTAB. No wheeze/rale/rhonchi  Heart - Normal rate, regular rhythm. No m/r/r  Abdomen - Soft, non tender. Non distended. Neurological - No focal deficits. CN III-XII nonfocal. Speech normal.   Musculoskeletal - Normal ROM, Gait normal.    Extremities - No LE edema. Distal pulses intact  Skin - normal coloration and normal turgor. No cyanosis, no rash. Assessment/Plan  1. Sore throat - likely due to post nasal drip 2/2 allergic rhinitis. Exam benign. Headache is likely due to nasal congestion. Have discussed with pt to monitor for symptoms pneumonia including SOB, increased sputum, fever ets. - Continue to monitor  - RTC in  1 week PRN if symptoms not improving. I have discussed the aforementioned diagnoses and plan with the patient in detail.  I have provided information in person and/or in AVS. All questions answered prior to discharge.     Ramirez Whatley MD  Family Medicine Resident

## 2019-08-13 DIAGNOSIS — I10 HYPERTENSION, ESSENTIAL: ICD-10-CM

## 2019-08-14 RX ORDER — AMLODIPINE BESYLATE 5 MG/1
5 TABLET ORAL DAILY
Qty: 90 TAB | Refills: 3 | Status: SHIPPED | OUTPATIENT
Start: 2019-08-14 | End: 2020-05-28

## 2019-08-14 NOTE — TELEPHONE ENCOUNTER
Request for amlodipine 5mg daily. Last office visit 12-26-18, next office visit 1-15-20. Refills per verbal order from Dr. Stiven Keller.

## 2019-10-11 ENCOUNTER — HOSPITAL ENCOUNTER (OUTPATIENT)
Dept: MAMMOGRAPHY | Age: 74
Discharge: HOME | End: 2019-10-11
Attending: FAMILY MEDICINE
Payer: MEDICARE

## 2019-10-11 DIAGNOSIS — Z12.39 BREAST SCREENING: ICD-10-CM

## 2019-10-11 PROCEDURE — 77067 SCR MAMMO BI INCL CAD: CPT

## 2019-11-11 DIAGNOSIS — E78.2 MIXED HYPERLIPIDEMIA: ICD-10-CM

## 2019-11-11 DIAGNOSIS — I10 HYPERTENSION, ESSENTIAL: ICD-10-CM

## 2019-11-12 RX ORDER — SIMVASTATIN 40 MG/1
TABLET, FILM COATED ORAL
Qty: 90 TAB | Refills: 0 | Status: SHIPPED | OUTPATIENT
Start: 2019-11-12 | End: 2020-01-20

## 2019-11-12 RX ORDER — METOPROLOL TARTRATE 25 MG/1
TABLET, FILM COATED ORAL
Qty: 180 TAB | Refills: 0 | Status: SHIPPED | OUTPATIENT
Start: 2019-11-12 | End: 2020-01-20

## 2019-11-12 NOTE — TELEPHONE ENCOUNTER
Requested Prescriptions     Signed Prescriptions Disp Refills    metoprolol tartrate (LOPRESSOR) 25 mg tablet 180 Tab 0     Sig: TAKE 1 TABLET TWICE DAILY     Authorizing Provider: Wendy Bear     Ordering User: GARRETT Isbell    simvastatin (ZOCOR) 40 mg tablet 90 Tab 0     Sig: TAKE 1 TABLET EVERY NIGHT     Authorizing Provider: Wendy Bear     Ordering User: Rosalind Morfin     Verbal order per Dr. Terrell Zeng.    Future Appointments   Date Time Provider Calista Bardales   1/15/2020  9:20 AM Dominic Hobson MD [address] [place] [address]

## 2019-12-30 DIAGNOSIS — I25.10 ATHEROSCLEROSIS OF NATIVE CORONARY ARTERY OF NATIVE HEART WITHOUT ANGINA PECTORIS: ICD-10-CM

## 2019-12-30 DIAGNOSIS — E78.2 MIXED HYPERLIPIDEMIA: ICD-10-CM

## 2019-12-31 RX ORDER — CLOPIDOGREL BISULFATE 75 MG/1
75 TABLET ORAL DAILY
Qty: 90 TAB | Refills: 3 | Status: SHIPPED | OUTPATIENT
Start: 2019-12-31 | End: 2020-01-15

## 2019-12-31 NOTE — TELEPHONE ENCOUNTER
Request for Plavix 75mg daily. Last office visit 12-26-18, next office visit 1-15-20.  Refills per verbal order from Dr. Eliazar Parikh.

## 2020-01-06 ENCOUNTER — TELEPHONE (OUTPATIENT)
Dept: CARDIOLOGY CLINIC | Age: 75
End: 2020-01-06

## 2020-01-06 DIAGNOSIS — E78.2 MIXED HYPERLIPIDEMIA: ICD-10-CM

## 2020-01-06 DIAGNOSIS — I25.10 CORONARY ARTERY DISEASE INVOLVING NATIVE CORONARY ARTERY OF NATIVE HEART WITHOUT ANGINA PECTORIS: Primary | ICD-10-CM

## 2020-01-06 NOTE — TELEPHONE ENCOUNTER
Patient requesting orders to get lab work before her appt on 1/15/20.  Please advise      CKWEU:[phone]

## 2020-01-11 LAB
ALBUMIN SERPL-MCNC: 4.2 G/DL (ref 3.5–4.8)
ALBUMIN/GLOB SERPL: 2.2 {RATIO} (ref 1.2–2.2)
ALP SERPL-CCNC: 61 IU/L (ref 39–117)
ALT SERPL-CCNC: 12 IU/L (ref 0–32)
AST SERPL-CCNC: 15 IU/L (ref 0–40)
BILIRUB DIRECT SERPL-MCNC: 0.15 MG/DL (ref 0–0.4)
BILIRUB SERPL-MCNC: 0.6 MG/DL (ref 0–1.2)
BUN SERPL-MCNC: 16 MG/DL (ref 8–27)
BUN/CREAT SERPL: 20 (ref 12–28)
CALCIUM SERPL-MCNC: 9.3 MG/DL (ref 8.7–10.3)
CHLORIDE SERPL-SCNC: 104 MMOL/L (ref 96–106)
CHOLEST SERPL-MCNC: 115 MG/DL (ref 100–199)
CO2 SERPL-SCNC: 24 MMOL/L (ref 20–29)
CREAT SERPL-MCNC: 0.79 MG/DL (ref 0.57–1)
GLOBULIN SER CALC-MCNC: 1.9 G/DL (ref 1.5–4.5)
GLUCOSE SERPL-MCNC: 104 MG/DL (ref 65–99)
HDLC SERPL-MCNC: 33 MG/DL
INTERPRETATION, 910389: NORMAL
LDLC SERPL CALC-MCNC: 57 MG/DL (ref 0–99)
POTASSIUM SERPL-SCNC: 4.5 MMOL/L (ref 3.5–5.2)
PROT SERPL-MCNC: 6.1 G/DL (ref 6–8.5)
SODIUM SERPL-SCNC: 145 MMOL/L (ref 134–144)
TRIGL SERPL-MCNC: 125 MG/DL (ref 0–149)
VLDLC SERPL CALC-MCNC: 25 MG/DL (ref 5–40)

## 2020-01-15 ENCOUNTER — OFFICE VISIT (OUTPATIENT)
Dept: CARDIOLOGY CLINIC | Age: 75
End: 2020-01-15

## 2020-01-15 VITALS
HEART RATE: 68 BPM | OXYGEN SATURATION: 97 % | SYSTOLIC BLOOD PRESSURE: 112 MMHG | RESPIRATION RATE: 18 BRPM | HEIGHT: 65 IN | BODY MASS INDEX: 21.83 KG/M2 | WEIGHT: 131 LBS | DIASTOLIC BLOOD PRESSURE: 60 MMHG

## 2020-01-15 DIAGNOSIS — I25.10 CORONARY ARTERY DISEASE INVOLVING NATIVE CORONARY ARTERY OF NATIVE HEART WITHOUT ANGINA PECTORIS: Primary | ICD-10-CM

## 2020-01-15 DIAGNOSIS — I10 HYPERTENSION, ESSENTIAL: ICD-10-CM

## 2020-01-15 DIAGNOSIS — E78.2 MIXED HYPERLIPIDEMIA: ICD-10-CM

## 2020-01-15 DIAGNOSIS — J44.9 CHRONIC OBSTRUCTIVE PULMONARY DISEASE, UNSPECIFIED COPD TYPE (HCC): ICD-10-CM

## 2020-01-15 RX ORDER — PANTOPRAZOLE SODIUM 40 MG/1
40 TABLET, DELAYED RELEASE ORAL DAILY
COMMUNITY
Start: 2019-11-26

## 2020-01-15 NOTE — PROGRESS NOTES
Visit Vitals  /60 (BP 1 Location: Left arm, BP Patient Position: Sitting)   Pulse 68   Resp 18   Ht 5' 5\" (1.651 m)   Wt 131 lb (59.4 kg)   SpO2 97%   BMI 21.80 kg/m²

## 2020-01-15 NOTE — Clinical Note
1/15/20 Patient: Lila Bates YOB: 1945 Date of Visit: 1/15/2020 MD Matt Ken Jesus [address] [facility] [address] [id number] VIA In Basket Dear Lilian Denise MD, Thank you for referring Ms. Cecilio Kirkland to [facility] for evaluation. My notes for this consultation are attached. If you have questions, please do not hesitate to call me. I look forward to following your patient along with you.  
 
 
Sincerely, 
 
Romana Drew, MD

## 2020-01-20 RX ORDER — METOPROLOL TARTRATE 25 MG/1
25 TABLET, FILM COATED ORAL 2 TIMES DAILY
Qty: 180 TAB | Refills: 3 | Status: SHIPPED | OUTPATIENT
Start: 2020-01-20 | End: 2021-04-06

## 2020-01-20 RX ORDER — SIMVASTATIN 40 MG/1
40 TABLET, FILM COATED ORAL
Qty: 90 TAB | Refills: 3 | Status: SHIPPED | OUTPATIENT
Start: 2020-01-20 | End: 2021-04-06

## 2020-01-20 NOTE — TELEPHONE ENCOUNTER
Request for metoprolol 25mg BID. Last office visit 1-15-20, next office visit 1-19-21.  Refills per verbal order from Dr. Wero Burt.

## 2020-05-26 DIAGNOSIS — I10 HYPERTENSION, ESSENTIAL: ICD-10-CM

## 2020-05-27 ENCOUNTER — APPOINTMENT (OUTPATIENT)
Dept: GENERAL RADIOLOGY | Age: 75
End: 2020-05-27
Attending: EMERGENCY MEDICINE
Payer: MEDICARE

## 2020-05-27 ENCOUNTER — HOSPITAL ENCOUNTER (EMERGENCY)
Age: 75
Discharge: HOME | End: 2020-05-27
Attending: EMERGENCY MEDICINE | Admitting: EMERGENCY MEDICINE
Payer: MEDICARE

## 2020-05-27 VITALS
DIASTOLIC BLOOD PRESSURE: 48 MMHG | WEIGHT: 131 LBS | TEMPERATURE: 98.4 F | RESPIRATION RATE: 16 BRPM | OXYGEN SATURATION: 99 % | HEART RATE: 63 BPM | SYSTOLIC BLOOD PRESSURE: 152 MMHG | BODY MASS INDEX: 21.8 KG/M2

## 2020-05-27 DIAGNOSIS — R07.89 MUSCULOSKELETAL CHEST PAIN: Primary | ICD-10-CM

## 2020-05-27 LAB
ANION GAP SERPL CALC-SCNC: 3 MMOL/L (ref 5–15)
ATRIAL RATE: 67 BPM
BASOPHILS # BLD: 0 K/UL (ref 0–0.1)
BASOPHILS NFR BLD: 1 % (ref 0–1)
BUN SERPL-MCNC: 18 MG/DL (ref 6–20)
BUN/CREAT SERPL: 23 (ref 12–20)
CALCIUM SERPL-MCNC: 9 MG/DL (ref 8.5–10.1)
CALCULATED P AXIS, ECG09: 35 DEGREES
CALCULATED R AXIS, ECG10: 1 DEGREES
CALCULATED T AXIS, ECG11: 36 DEGREES
CHLORIDE SERPL-SCNC: 108 MMOL/L (ref 97–108)
CO2 SERPL-SCNC: 29 MMOL/L (ref 21–32)
CREAT SERPL-MCNC: 0.77 MG/DL (ref 0.55–1.02)
DIAGNOSIS, 93000: NORMAL
DIFFERENTIAL METHOD BLD: ABNORMAL
EOSINOPHIL # BLD: 0.1 K/UL (ref 0–0.4)
EOSINOPHIL NFR BLD: 1 % (ref 0–7)
ERYTHROCYTE [DISTWIDTH] IN BLOOD BY AUTOMATED COUNT: 14.9 % (ref 11.5–14.5)
GLUCOSE SERPL-MCNC: 102 MG/DL (ref 65–100)
HCT VFR BLD AUTO: 37.1 % (ref 35–47)
HGB BLD-MCNC: 12 G/DL (ref 11.5–16)
IMM GRANULOCYTES # BLD AUTO: 0.1 K/UL (ref 0–0.04)
IMM GRANULOCYTES NFR BLD AUTO: 1 % (ref 0–0.5)
LYMPHOCYTES # BLD: 1.1 K/UL (ref 0.8–3.5)
LYMPHOCYTES NFR BLD: 19 % (ref 12–49)
MCH RBC QN AUTO: 31.7 PG (ref 26–34)
MCHC RBC AUTO-ENTMCNC: 32.3 G/DL (ref 30–36.5)
MCV RBC AUTO: 98.1 FL (ref 80–99)
MONOCYTES # BLD: 0.5 K/UL (ref 0–1)
MONOCYTES NFR BLD: 8 % (ref 5–13)
NEUTS SEG # BLD: 4 K/UL (ref 1.8–8)
NEUTS SEG NFR BLD: 70 % (ref 32–75)
NRBC # BLD: 0 K/UL (ref 0–0.01)
NRBC BLD-RTO: 0 PER 100 WBC
P-R INTERVAL, ECG05: 132 MS
PLATELET # BLD AUTO: 156 K/UL (ref 150–400)
PMV BLD AUTO: 10.6 FL (ref 8.9–12.9)
POTASSIUM SERPL-SCNC: 4.3 MMOL/L (ref 3.5–5.1)
Q-T INTERVAL, ECG07: 460 MS
QRS DURATION, ECG06: 88 MS
QTC CALCULATION (BEZET), ECG08: 486 MS
RBC # BLD AUTO: 3.78 M/UL (ref 3.8–5.2)
SODIUM SERPL-SCNC: 140 MMOL/L (ref 136–145)
TROPONIN I SERPL-MCNC: <0.05 NG/ML
VENTRICULAR RATE, ECG03: 67 BPM
WBC # BLD AUTO: 5.8 K/UL (ref 3.6–11)

## 2020-05-27 PROCEDURE — 80048 BASIC METABOLIC PNL TOTAL CA: CPT

## 2020-05-27 PROCEDURE — 84484 ASSAY OF TROPONIN QUANT: CPT

## 2020-05-27 PROCEDURE — 85025 COMPLETE CBC W/AUTO DIFF WBC: CPT

## 2020-05-27 PROCEDURE — 99284 EMERGENCY DEPT VISIT MOD MDM: CPT

## 2020-05-27 PROCEDURE — 77030027138 HC INCENT SPIROMETER -A

## 2020-05-27 PROCEDURE — 71046 X-RAY EXAM CHEST 2 VIEWS: CPT

## 2020-05-27 PROCEDURE — 93005 ELECTROCARDIOGRAM TRACING: CPT

## 2020-05-27 PROCEDURE — 36415 COLL VENOUS BLD VENIPUNCTURE: CPT

## 2020-05-27 NOTE — ED PROVIDER NOTES
Patient is a 68-year-old female with history of coronary artery disease status post LAD stent placement in , COPD, hypertension, hyperlipidemia, osteopenia presented to ED for evaluation of right-sided chest and right mid lateral rib pain with onset 5 days ago. She reports that she got locked out of her house on Saturday, and crawled through a small window in her kitchen, when she felt a \"pop\" in her right chest.  Pain has been constant ever since and is radiating to the right scapular area. She denies any known blunt trauma. She denies central chest pain, pain radiating to the extremities, paresthesia, shortness of breath, abdominal pain, or any other medical points at this time. She is seen by Dr. Francisco Ornelas for cardiology, normal stress echo 2019. She is taking 81 mg Aspirin daily. Past Medical History:   Diagnosis Date    Anxiety     Arthritis     CAD (coronary artery disease)     COPD (chronic obstructive pulmonary disease) ([facility] [address] 2016    Coronary atherosclerosis of native coronary artery     MI/Left ventricular wall motion is segmentally abnormal with mild hypokinesis of the anterior wall. Ejection Fraction is estimated at 50%. LAD >90% stenosis. LCX 50% stenosis.  Depression     Elevated BP 2017    Family history of suicide     father    Fatigue     GERD (gastroesophageal reflux disease)     Glaucoma     Headache 1988    migraines    Hypertension, essential 2017    Incontinence in female     Lung nodule     Menopause     Mixed hyperlipidemia     Osteopenia 2015    DEXA ,     S/P angioplasty with stent     stent to LAD. 3X8mm Cypher.         Past Surgical History:   Procedure Laterality Date    HX CATARACT REMOVAL      HX  SECTION      HX COLONOSCOPY      reportedly normal    HX CORONARY STENT PLACEMENT  9723,8392    HX ENDOSCOPY      reportedly normal    HX HEART CATHETERIZATION      Left ventricular wall motion is segmentally abnormal with mild hypokinesis of the anterior wall. Ejection Fraction is estimated at 50%. LAD >90% stenosis. LCX 50% stenosis.  HX HEART CATHETERIZATION  2014    2 stents placed    HX HEENT      sinus    HX HIP REPLACEMENT  2016    HX ORTHOPAEDIC  8/10/15    hip replacement    HX PTCA  2006    stent to LAD. 3X8mm Cypher.      US GUIDED CORE BREAST BIOPSY Left 2010         Family History:   Problem Relation Age of Onset    Suicide Father     Heart Disease Sister          61    Cancer Brother         bladder cancer    Stroke Mother        Social History     Socioeconomic History    Marital status:      Spouse name: Not on file    Number of children: Not on file    Years of education: Not on file    Highest education level: Not on file   Occupational History    Not on file   Social Needs    Financial resource strain: Not on file    Food insecurity     Worry: Not on file     Inability: Not on file    Transportation needs     Medical: Not on file     Non-medical: Not on file   Tobacco Use    Smoking status: Never Smoker    Smokeless tobacco: Never Used   Substance and Sexual Activity    Alcohol use: No     Alcohol/week: 0.0 standard drinks    Drug use: No    Sexual activity: Never   Lifestyle    Physical activity     Days per week: Not on file     Minutes per session: Not on file    Stress: Not on file   Relationships    Social connections     Talks on phone: Not on file     Gets together: Not on file     Attends Sikhism service: Not on file     Active member of club or organization: Not on file     Attends meetings of clubs or organizations: Not on file     Relationship status: Not on file    Intimate partner violence     Fear of current or ex partner: Not on file     Emotionally abused: Not on file     Physically abused: Not on file     Forced sexual activity: Not on file   Other Topics Concern    Not on file   Social History Narrative    ** Merged History Encounter **              ALLERGIES: Lamictal [lamotrigine] and Lamictal [lamotrigine]    Review of Systems   Constitutional: Negative for chills and fever. HENT: Negative for ear pain and sore throat. Eyes: Negative for visual disturbance. Respiratory: Negative for cough and shortness of breath. Cardiovascular: Positive for chest pain. Negative for palpitations and leg swelling. Gastrointestinal: Negative for abdominal pain, diarrhea, nausea and vomiting. Genitourinary: Negative for flank pain. Musculoskeletal: Negative for back pain. Skin: Negative for color change. Neurological: Negative for dizziness and headaches. Psychiatric/Behavioral: Negative for confusion. Vitals:    05/27/20 1152   BP: 183/64   Pulse: 66   Resp: 16   Temp: 98.3 °F (36.8 °C)   SpO2: 98%   Weight: 59.4 kg (131 lb)            Physical Exam  Vitals signs and nursing note reviewed. Constitutional:       General: She is not in acute distress. Appearance: Normal appearance. She is not ill-appearing. HENT:      Head: Normocephalic and atraumatic. Mouth/Throat:      Pharynx: Oropharynx is clear. Eyes:      Extraocular Movements: Extraocular movements intact. Conjunctiva/sclera: Conjunctivae normal.   Neck:      Musculoskeletal: Normal range of motion. No neck rigidity. Cardiovascular:      Rate and Rhythm: Normal rate and regular rhythm. Pulmonary:      Effort: Pulmonary effort is normal. No respiratory distress. Breath sounds: Normal breath sounds. No stridor. No wheezing, rhonchi or rales. Chest:      Chest wall: Tenderness (ttp right central chest extending latreally to the lateral middle chest wall) present. Abdominal:      General: There is no distension. Palpations: Abdomen is soft. Tenderness: There is no abdominal tenderness. Musculoskeletal: Normal range of motion. Right lower leg: No edema. Left lower leg: No edema.       Comments: Tender to palpation along the lateral right-sided chest wall. No crepitus or palpable bony deformities. Pain is reproducible with palpation and abduction of the upper extremities. No midline cervical or thoracic tenderness. Skin:     General: Skin is warm and dry. Neurological:      General: No focal deficit present. Mental Status: She is alert and oriented to person, place, and time. Psychiatric:         Mood and Affect: Mood normal.          MDM  Number of Diagnoses or Management Options  Diagnosis management comments: Patient is alert and well-appearing, vitals stable, no clinical signs of respiratory distress. Lungs clear to auscultation bilaterally, without decreased breath sounds. Per history and physical exam she appears to have traumatic musculoskeletal chest pain. No palpable bony deformity or crepitus palpated throughout exam of chest wall and upper back. No midline tenderness. Important to evaluate patient for ACS due to her age and history of ischemic cardiac disease. EKG without acute ischemic changes. Plan: CBC, BMP, troponin, chest xray, monitor, reassess        Amount and/or Complexity of Data Reviewed  Clinical lab tests: reviewed  Tests in the radiology section of CPT®: reviewed  Tests in the medicine section of CPT®: reviewed  Discuss the patient with other providers: yes (Attending MD)      ED Course as of May 27 1308   Wed May 27, 2020   1204 ED EKG interpretation:12:04 PM  Rhythm: normal sinus rhythm; and regular. Rate (approx.): 67; Axis: normal; P wave: normal; ST/T wave: no concerning ST elevations or depressions; Other findings: unremarkable. JACLYN Gonzalez        [EH]   1250 Chest 2 views dated 5/27/2020     Comparison chest dated 2/26/2018     History is right-sided chest pain     PA and lateral views of the chest were obtained. The cardiac silhouette is  normal in size.  There is no evidence of active lung disease.     IMPRESSION  IMPRESSION: No evidence of active intrathoracic disease. XR CHEST PA AND LATERAL [EH]   1257 CBC WITH AUTOMATED DIFF(!):    WBC 5.8   RBC 3.78(!)   HGB 12.0   HCT 37.1   MCV 98.1   MCH 31.7   MCHC 32.3   RDW 14.9(!)   PLATELET 022   MPV 29.4   NRBC 0.0   ABSOLUTE NRBC 0.00   NEUTROPHILS 70   LYMPHOCYTES 19   MONOCYTES 8   EOSINOPHILS 1   BASOPHILS 1   IMMATURE GRANULOCYTES 1(!)   ABS. NEUTROPHILS 4.0   ABS. LYMPHOCYTES 1.1   ABS. MONOCYTES 0.5   ABS. EOSINOPHILS 0.1   ABS. BASOPHILS 0.0   ABS. IMM. GRANS. 0.1(!)   DF AUTOMATED [EH]   2541 METABOLIC PANEL, BASIC(!):    Sodium 140   Potassium 4.3   Chloride 108   CO2 29   Anion gap 3(!)   Glucose 102(!)   BUN 18   Creatinine 0.77   BUN/Creatinine ratio 23(!)   GFR est AA >60   GFR est non-AA >60   Calcium 9.0 [EH]   1257 TROPONIN I:    Troponin-I, Qt. <0.05 [EH]      ED Course User Index  [EH] Kristine Piña PA     1:08 PM  Chest x-ray negative for acute bony injury. Troponin x1 normal.  Signs symptoms likely secondary to muscular strain. Patient given incentive spirometry and instructions for home use, and education on importance of pain control and taking deep breaths. Patient to be discharged home, strict return precautions given to return to emergency department with any worsening pain or shortness of breath. Advised her to follow-up with her primary care physician if her symptoms do not improve. All questions answered at this time. 1:10 PM  Pt has been reevaluated. There are no new complaints, changes, or physical findings at this time. Medications have been reviewed w/ pt and/or family. Pt and/or family's questions have been answered. Pt and/or family expressed good understanding of the dx/tx/rx and is in agreement with plan of care. Pt instructed and agreed to f/u w/ PCP and to return to ED upon further deterioration. Pt is ready for discharge. IMPRESSION:  1. Musculoskeletal chest pain        PLAN:  1. Current Discharge Medication List        2.    Follow-up Information Follow up With Specialties Details Why Contact Info    Vinh Pate MD Family Practice Go in 1 week If symptoms continue [address] [place] [address]  [phone]      [facility] EMERGENCY DEPT Emergency Medicine Go to  If symptoms worsen [address] [facility]  [phone]            Return to ED if worse     Procedures

## 2020-05-27 NOTE — DISCHARGE INSTRUCTIONS
Tylenol 1000 mg alternating every 6-8 hours for pain, fever and/or body aches. Do not take more than 3 grams daily of tylenol. Use incentive spirometry as directed daily.

## 2020-05-27 NOTE — ED TRIAGE NOTES
Rt sided rib chest pain after climbing thru a window on x 2 days. Worse with palpation. Denies LOC. On baby ASA.

## 2020-05-28 ENCOUNTER — PATIENT OUTREACH (OUTPATIENT)
Dept: INTERNAL MEDICINE CLINIC | Age: 75
End: 2020-05-28

## 2020-05-28 RX ORDER — AMLODIPINE BESYLATE 5 MG/1
TABLET ORAL
Qty: 90 TAB | Refills: 3 | Status: SHIPPED | OUTPATIENT
Start: 2020-05-28 | End: 2021-08-09

## 2020-05-28 NOTE — TELEPHONE ENCOUNTER
Cardiologist: Dr. Angel Sanders    Last appt: 1/15/2020  Future Appointments   Date Time Provider Calista Bardales   1/19/2021 11:00 AM [facility]       Requested Prescriptions     Signed Prescriptions Disp Refills    amLODIPine (NORVASC) 5 mg tablet 90 Tab 3     Sig: TAKE 1 TABLET EVERY DAY     Authorizing Provider: Kwaku Mckeon     Ordering User: PAT MOE         Refills VO per Dr. Angel Sanders.

## 2020-05-28 NOTE — PROGRESS NOTES
Patient's son, Josue Baron,  contacted regarding recent discharge and COVID-19 risk. Did not discuss COVID-19 related testing which was not done at this time. Ambulatory Care Manager contacted the patient's son, Josue Baron, [address] in chart,  by telephone to perform post discharge assessment. Verified name and  with patient's son, Josue Baron,  as identifiers. Patient has following risk factors of: COPD and ED visit 20. CTN/ACM reviewed discharge instructions, medical action plan and red flags related to discharge diagnosis. Reviewed and educated them on any new and changed medications related to discharge diagnosis. Education provided regarding infection prevention, and signs and symptoms of COVID-19 and when to seek medical attention with patient's son, Josue Baron,  who verbalized understanding. Discussed exposure protocols and quarantine from [address] you at higher risk for severe illness  and given an opportunity for questions and concerns. The patient's son, Josue Baron,  agrees to contact the COVID-19 hotline [phone] or PCP office for questions related to their healthcare. ACM provided contact information for future reference. From CDC: Are you at higher risk for severe illness?  Wash your hands often.  Avoid close contact (6 feet, which is about two arm lengths) with people who are sick.  Put distance between yourself and other people if COVID-19 is spreading in your community.  Clean and disinfect frequently touched surfaces.  Avoid all cruise travel and non-essential air travel.  Call your healthcare professional if you have concerns about COVID-19 and your underlying condition or if you are sick. For more information on steps you can take to protect yourself, see CDC's How to Protect Yourself      Patient/family/caregiver given information for [facility] and agrees to enroll unable to offer due to patient did not answer call.  ACM spoke with patient's son to provide resources regarding COVID-19      Plan for follow-up call in 7-14 days based on severity of symptoms and risk factors.     Jackie Lizarraga RN  Ambulatory Care Manager

## 2020-06-13 ENCOUNTER — PATIENT OUTREACH (OUTPATIENT)
Dept: INTERNAL MEDICINE CLINIC | Age: 75
End: 2020-06-13

## 2020-06-13 NOTE — PROGRESS NOTES
Patient resolved from Transition of Care episode on 6/13/20  Discussed COVID-19 related testing which was not done at this time. Patient/family has been provided the following resources and education related to COVID-19:                         Signs, symptoms and red flags related to COVID-19            CDC exposure and quarantine guidelines            [employer] exposure contact - [phone]            Contact for their local Department of Health                 Patient currently reports that the following symptoms have improved:  continues with off and on R sided chest pain. Patient was told it was a possible pulled muscle. ACM encouraged call back to PCP on Monday for FU appointment, take Tylenol for pain and use heating pad intemttently. Patient verbalized understanding. No further outreach scheduled with this ACM. Episode of Care resolved. Patient has this ACM contact information if future needs arise.   Mickey Charles RN  Ambulatory Care Manager

## 2020-06-15 ENCOUNTER — TELEPHONE (OUTPATIENT)
Dept: FAMILY MEDICINE CLINIC | Age: 75
End: 2020-06-15

## 2020-06-15 NOTE — TELEPHONE ENCOUNTER
Returned call to patient based on a Staff message taken by [facility] for wrong patient. Called the number in the message and patient verified her name Rebeka Pepper) and  OF [birth year]. Reports discomfort in chest middle to right area. Some difficulty breathing. Notes pain level 8. Spoke to nurse Supervisor Justino LOCKETT) for triage. Patient told her this has been going on since last month in which she climbed through a window from locking herself out of house. Patient was seen in ED/[facility] for this.     Patient was scheduled for PHONE only visit for 2020 01:00 PM f [facility]-MAIN OFFICE, [email or web address], Virtual Visit Special Case 30, 30min      Patient doesn't have a smart device

## 2020-06-15 NOTE — TELEPHONE ENCOUNTER
Spoke with patient, she stated she climbed through a window and went to the Er on 5/27, said it was strained muscle. Patient stated the pain is still there and is still causing Shortness of breath and wants to make sure that it is not something else. Explained we needed to do a virtual visit first and patient  Explained she could not do virtual it would have to be a telephone. Appointment made for tomorrow morning.

## 2020-06-16 ENCOUNTER — TELEPHONE (OUTPATIENT)
Dept: FAMILY MEDICINE CLINIC | Age: 75
End: 2020-06-16

## 2020-06-16 ENCOUNTER — VIRTUAL VISIT (OUTPATIENT)
Dept: FAMILY MEDICINE CLINIC | Age: 75
End: 2020-06-16

## 2020-06-16 DIAGNOSIS — R06.02 SOB (SHORTNESS OF BREATH): ICD-10-CM

## 2020-06-16 DIAGNOSIS — R07.9 CHEST PAIN, UNSPECIFIED TYPE: Primary | ICD-10-CM

## 2020-06-16 NOTE — PROGRESS NOTES
Zeb Maradiaga  66 y.o. female  1945  [address] [facility] [place] [address]  [id number]    [phone] (home)      [address]:    Telephone Encounter  Josr Muir MD       Encounter Date: 6/16/2020 at 1:22 PM    Consent: Zeb Maradiaga, who was seen by synchronous (real-time) audio only technology, and/or her healthcare decision maker, is aware that this patient-initiated, Telehealth encounter on 6/16/2020 is a billable service, with coverage as determined by her insurance carrier. She is aware that she may receive a bill and has provided verbal consent to proceed: Yes. Chief Complaint   Patient presents with    Transitions Of Care       History of Present Illness   Zeb Maradiaga is a 66 y.o. female was evaluated by telephone. I communicated with the patient and/or health care decision maker about transition of care. Patient was recently evaluated in the ER on 5/27 for chest pain. In the ED troponin, EKG and CXR was unremarkable. Patient states that since then continues having pain. Describes the pain as a sharp pain located in the middle of the chest that radiates to the right side, aggravated when laying on the right side. States she has no pain when sitting up but is 5/10 pain when laying on the right side. Patient endorses SOB at this time. Has had it for many years however thinks it is worse now. States she is able to speak in complete sentences and walk however has noticed it is a little harder to breath. States she has a questionable hisotry of COPD, denies every smoking but states late  used to smoke [place] lot\" around her. Of note patient has a history of pulmonary nodules that had been monitored by CT scan and showed no growth. Follows outpatient with Dr. Jose Huerta for hx of CAD s/pcath in 2006, LAD stenosis 2007 with proximal GORDON. Stress echo normal January 2019.     Review of Systems   Review of Systems   Constitutional: Negative for chills and fever.   HENT: Negative for congestion and sore throat. Eyes: Negative for blurred vision and double vision. Respiratory: Positive for shortness of breath. Negative for cough, hemoptysis, sputum production and wheezing. Cardiovascular: Positive for chest pain. Negative for palpitations and leg swelling. Gastrointestinal: Negative for abdominal pain, blood in stool, constipation, diarrhea, heartburn, melena, nausea and vomiting. Genitourinary: Negative for dysuria and urgency. Musculoskeletal: Negative for back pain and joint pain. Skin: Negative for rash. Neurological: Negative for dizziness, focal weakness and headaches. Psychiatric/Behavioral: Negative for suicidal ideas. Vitals/Objective:   General: Patient speaking in complete sentences without effort. Normal speech and cooperative. Due to this being a Virtual Check-in/Telephone evaluation, many elements of the physical examination are unable to be assessed. Assessment and Plan:   Time-based coding, delete if not needed: I spent at least 25 minutes with this established patient, and >50% of the time was spent counseling and/or coordinating care regarding chest pain and SOB  Total Time: minutes: 21-30 minutes    1. Chest pain, likely MSK related: Pain is reproducible by laying on right side and radiates to right side of chest. All workup in the ED including troponin and EKG and CXR were negative. Patient follows with Dr. Chris Bennett for hx of CAD s/pcath in 2006, LAD stenosis 2007 with proximal GORDON. Stress echo normal January 2019.  - Patient will follow upwith Dr. Chris Bennett given cardiac history. Does not sound anginal however given Cardiac history warrants careful monitoring. No longer on Plavix. - Patient was counseled on continuing conservative management     2. SOB: could be secondary to MSK etiology given pain with deep breaths. COPD unlikely given it would be unusual as she has never smoked and is not wheezing. Infectious etiology is improbable given the absence of fever and cough. - Patient was provided with [facility] information and address so physical exam and imaging could be obtained. Patient would also benefit from COVID testing.   - Patient expressed understanding and stated she would [facility]. Patient was extensively counseled on warning signs and when to seek immediate medical attention    We discussed the expected course, resolution and complications of the diagnosis(es) in detail. Medication risks, benefits, costs, interactions, and alternatives were discussed as indicated. I advised her to contact the office if her condition worsens, changes or fails to improve as anticipated. She expressed understanding with the diagnosis(es) and plan. Patient understands that this encounter was a temporary measure, and the importance of further follow up and examination was emphasized. Patient verbalized understanding. Patient informed to follow up: in  One week. I affirm this is a Patient Initiated Episode with an Established Patient who has not had a related appointment within my department in the past 7 days or scheduled within the next 24 hours. Note: not billable if this call serves to triage the patient into an appointment for the relevant concern      Electronically Signed: Crispin Castro MD  Providers location when delivering service: home    CPT:  25598 (5-10 minutes)  [phone] (11-20 minutes)  21  (21-30 minutes)    Medicare:  [address]      ICD-10-CM ICD-9-CM    1. Chest pain, unspecified type R07.9 786.50    2.  SOB (shortness of breath) R06.02 786.05        Pursuant to the emergency declaration under the [facility] waiver authority and the [employer] Act, this Virtual  Visit was conducted, with patient's consent, to reduce the patient's risk of exposure to COVID-19 and provide continuity of care for an established patient. History   Patients past medical, surgical and family histories were personally reviewed and updated. Past Medical History:   Diagnosis Date    Anxiety     Arthritis     CAD (coronary artery disease)     COPD (chronic obstructive pulmonary disease) ([facility] [address] 2016    Coronary atherosclerosis of native coronary artery 2006    MI/Left ventricular wall motion is segmentally abnormal with mild hypokinesis of the anterior wall. Ejection Fraction is estimated at 50%. LAD >90% stenosis. LCX 50% stenosis.  Depression     Elevated BP 2017    Family history of suicide     father    Fatigue     GERD (gastroesophageal reflux disease)     Glaucoma     Headache     migraines    Hypertension, essential 2017    Incontinence in female     Lung nodule     Menopause     Mixed hyperlipidemia     Osteopenia 2015    DEXA ,     S/P angioplasty with stent     stent to LAD. 3X8mm Cypher. Past Surgical History:   Procedure Laterality Date    HX CATARACT REMOVAL      HX  SECTION      HX COLONOSCOPY      reportedly normal    HX CORONARY STENT PLACEMENT  [id number]    HX ENDOSCOPY      reportedly normal    HX HEART CATHETERIZATION      Left ventricular wall motion is segmentally abnormal with mild hypokinesis of the anterior wall. Ejection Fraction is estimated at 50%. LAD >90% stenosis. LCX 50% stenosis.  HX HEART CATHETERIZATION      2 stents placed    HX HEENT      sinus    HX HIP REPLACEMENT      HX ORTHOPAEDIC  8/10/15    hip replacement    HX PTCA  2006    stent to LAD. 3X8mm Cypher.      US GUIDED CORE BREAST BIOPSY Left 2010     Family History   Problem Relation Age of Onset    Suicide Father     Heart Disease Sister          61    Cancer Brother         bladder cancer    Stroke Mother      Social History     Socioeconomic History    Marital status:      Spouse name: Not on file    Number of children: Not on file    Years of education: Not on file    Highest education level: Not on file   Occupational History    Not on file   Social Needs    Financial resource strain: Not on file    Food insecurity     Worry: Not on file     Inability: Not on file    Transportation needs     Medical: Not on file     Non-medical: Not on file   Tobacco Use    Smoking status: Never Smoker    Smokeless tobacco: Never Used   Substance and Sexual Activity    Alcohol use: No     Alcohol/week: 0.0 standard drinks    Drug use: No    Sexual activity: Never   Lifestyle    Physical activity     Days per week: Not on file     Minutes per session: Not on file    Stress: Not on file   Relationships    Social connections     Talks on phone: Not on file     Gets together: Not on file     Attends Episcopal service: Not on file     Active member of club or organization: Not on file     Attends meetings of clubs or organizations: Not on file     Relationship status: Not on file    Intimate partner violence     Fear of current or ex partner: Not on file     Emotionally abused: Not on file     Physically abused: Not on file     Forced sexual activity: Not on file   Other Topics Concern    Not on file   Social History Narrative    ** Merged History Encounter **                 Current Medications/Allergies   Medications and Allergies reviewed:    Current Outpatient Medications   Medication Sig Dispense Refill    amLODIPine (NORVASC) 5 mg tablet TAKE 1 TABLET EVERY DAY 90 Tab 3    metoprolol tartrate (LOPRESSOR) 25 mg tablet Take 1 Tab by mouth two (2) times a day. 180 Tab 3    simvastatin (ZOCOR) 40 mg tablet Take 1 Tab by mouth nightly. 90 Tab 3    pantoprazole (PROTONIX) 40 mg tablet       aspirin delayed-release 81 mg tablet Take  by mouth daily.  citalopram (CELEXA) 20 mg tablet Take  by mouth daily.       calcium-cholecalciferol, d3, (CALCIUM 600 + D) 600-125 mg-unit tab Take 1 Tab by mouth two (2) times a day.  latanoprost (XALATAN) 0.005 % ophthalmic solution Administer 1 Drop to both eyes nightly.        Allergies   Allergen Reactions    Lamictal [Lamotrigine] Anaphylaxis    Lamictal [Lamotrigine] Rash and Itching

## 2020-06-16 NOTE — TELEPHONE ENCOUNTER
Received a transferred call from [facility] as pt report chest pain and SOB. Pt states that she has center slight (R) sided chest pains and SOB and that she was advised by Dr. Mai Xavier to call here to make an appt to be seen. I asked to elaborate on her sx's. Reported that on May 27 she went to the ED possible muscle strain when she crawled through a window. States that she has had the pain since then. Advised pt that we have no Green appt's for this week and next week is not open. I asked pt if she was having any COVID sx's, to which she answered no to all. I checked the pt's chart and noted that she had completed a VV with Joi Ryan today. I read through the visit and didn't note anything that required urgent evaluation. I advised the pt that I sent a message to Dr. Mai Xavier to see if she could clarify what she wanted us to do for the pt. Stated that I would call the pt back if I heard anything.

## 2020-08-25 NOTE — ED NOTES
Pl, advise pt  -  Referral is ready for p/u -  Dr Alvares in [address] Seen by Dr. Tera Zhao in room 22. Discharged by provider.

## 2020-09-21 ENCOUNTER — TELEPHONE (OUTPATIENT)
Dept: FAMILY MEDICINE CLINIC | Age: 75
End: 2020-09-21

## 2020-09-21 NOTE — TELEPHONE ENCOUNTER
----- Message from Canelo Vani sent at 9/21/2020  1:49 PM EDT -----  Regarding: Dr. Robinson Cordero first and last name and relationship to patient (if not the patient): n/a  Best contact number: [phone]  Preferred date and time: after 10am  Scheduled appointment date and time: none avail  Reason for appointment: Yeast Infection  Details to clarify the request: Ms. Mann Sanchez would like to schedule visit for yeast infection.

## 2020-10-13 ENCOUNTER — HOSPITAL ENCOUNTER (OUTPATIENT)
Dept: MAMMOGRAPHY | Age: 75
Discharge: HOME | End: 2020-10-13
Attending: FAMILY MEDICINE
Payer: MEDICARE

## 2020-10-13 DIAGNOSIS — Z12.31 ENCOUNTER FOR SCREENING MAMMOGRAM FOR MALIGNANT NEOPLASM OF BREAST: ICD-10-CM

## 2020-10-13 PROCEDURE — 77067 SCR MAMMO BI INCL CAD: CPT

## 2021-01-27 ENCOUNTER — OFFICE VISIT (OUTPATIENT)
Dept: CARDIOLOGY CLINIC | Age: 76
End: 2021-01-27
Payer: MEDICARE

## 2021-01-27 VITALS
SYSTOLIC BLOOD PRESSURE: 118 MMHG | HEART RATE: 60 BPM | BODY MASS INDEX: 22.33 KG/M2 | HEIGHT: 65 IN | OXYGEN SATURATION: 97 % | WEIGHT: 134 LBS | DIASTOLIC BLOOD PRESSURE: 58 MMHG

## 2021-01-27 DIAGNOSIS — I25.10 CORONARY ARTERY DISEASE INVOLVING NATIVE CORONARY ARTERY OF NATIVE HEART WITHOUT ANGINA PECTORIS: Primary | ICD-10-CM

## 2021-01-27 DIAGNOSIS — I10 HYPERTENSION, ESSENTIAL: ICD-10-CM

## 2021-01-27 DIAGNOSIS — E78.2 MIXED HYPERLIPIDEMIA: ICD-10-CM

## 2021-01-27 DIAGNOSIS — J44.9 CHRONIC OBSTRUCTIVE PULMONARY DISEASE, UNSPECIFIED COPD TYPE (HCC): ICD-10-CM

## 2021-01-27 PROCEDURE — G8754 DIAS BP LESS 90: HCPCS | Performed by: SPECIALIST

## 2021-01-27 PROCEDURE — G8536 NO DOC ELDER MAL SCRN: HCPCS | Performed by: SPECIALIST

## 2021-01-27 PROCEDURE — G9717 DOC PT DX DEP/BP F/U NT REQ: HCPCS | Performed by: SPECIALIST

## 2021-01-27 PROCEDURE — G8427 DOCREV CUR MEDS BY ELIG CLIN: HCPCS | Performed by: SPECIALIST

## 2021-01-27 PROCEDURE — G8420 CALC BMI NORM PARAMETERS: HCPCS | Performed by: SPECIALIST

## 2021-01-27 PROCEDURE — 99214 OFFICE O/P EST MOD 30 MIN: CPT | Performed by: SPECIALIST

## 2021-01-27 PROCEDURE — 1101F PT FALLS ASSESS-DOCD LE1/YR: CPT | Performed by: SPECIALIST

## 2021-01-27 PROCEDURE — G8752 SYS BP LESS 140: HCPCS | Performed by: SPECIALIST

## 2021-01-27 PROCEDURE — 1090F PRES/ABSN URINE INCON ASSESS: CPT | Performed by: SPECIALIST

## 2021-01-27 RX ORDER — CLOTRIMAZOLE AND BETAMETHASONE DIPROPIONATE 10; .64 MG/G; MG/G
CREAM TOPICAL AS NEEDED
COMMUNITY
Start: 2021-01-05 | End: 2022-02-02

## 2021-01-27 RX ORDER — BRIMONIDINE TARTRATE 2 MG/ML
1 SOLUTION/ DROPS OPHTHALMIC 2 TIMES DAILY
COMMUNITY
Start: 2021-01-06

## 2021-01-27 NOTE — PROGRESS NOTES
Lennox Abebe     1945       Ofelia Gage MD, [facility]  Date of Visit-1/27/2021   PCP is Lauren Jones MD   [facility] [place]  [facility]  HPI:  Lennox Abebe is a 66 y.o. female   1 year f/u of CAD and HTN. Lanes of pain in the upper middle chest that goes to the back and shoulder blades. She gets her care at [facility] and she is had some yeast infections    She is still getting intermittent sub-xyphoid pain that radiates to her back and shoulder blades. This pain is different from the pain she had before her cath. She reports having SOB but unchanged from her baseline. She has an appointment with GI. Denies edema, syncope or shortness of breath at rest, has no tachycardia, palpitations or sense of arrhythmia. Last visit we stopped her Plavix. She saw her [facility] doctor for chest pain in June after being admitted to the ER on 5/27. Was felt to be musculoskeletal. EKG in May was oc  Assessment/Plan:     Patient Instructions   Please have your fasting blood work completed. We will call with results and see you back for an annual follow up. Future Appointments   Date Time Provider Calista Bardales   2/2/2022 10:00 AM Ofelia Ocampo MD [facility] AMB        1. Coronary artery disease involving native coronary artery of native heart without angina pectoris  Stopped Plavix last year. Cath in 2006 and GORDON in 2007 to the proximal LAD. Normal stress echo January 2019 at 5 minutes. The current pain she describes does not seem to be cardiac. It is lower in the chest, not like her previous pain, and not exertional.     2. Mixed hyperlipidemia  Will repeat lipid profile. Continue Zocor. At goal , denies excess muscle aches or new liver issues  Key Antihyperlipidemia Meds             simvastatin (ZOCOR) 40 mg tablet (Taking) Take 1 Tab by mouth nightly.          Lab Results   Component Value Date/Time    LDL, calculated 57 01/10/2020 09:10 AM       3. Hypertension, essential  Stable. At goal , meds and possible side effects reviewed and patient denies  Key CAD CHF Meds             amLODIPine (NORVASC) 5 mg tablet (Taking) TAKE 1 TABLET EVERY DAY    metoprolol tartrate (LOPRESSOR) 25 mg tablet (Taking) Take 1 Tab by mouth two (2) times a day. simvastatin (ZOCOR) 40 mg tablet (Taking) Take 1 Tab by mouth nightly. aspirin delayed-release 81 mg tablet (Taking) Take  by mouth daily. BP Readings from Last 6 Encounters:   01/27/21 (!) 118/58   05/27/20 152/48   01/15/20 112/60   07/22/19 106/44   12/26/18 112/56   12/17/18 129/47        4. Chronic obstructive pulmonary disease, unspecified COPD type (HCC)  Chronic SOB, unchanged. F/u in 1 year     Impression:   1. Coronary artery disease involving native coronary artery of native heart without angina pectoris    2. Mixed hyperlipidemia    3. Hypertension, essential    4. Chronic obstructive pulmonary disease, unspecified COPD type ([facility] [address]       Cardiac History:   NUKE 3-1-13= walked 5 minutes, STT 3 mm horizontal, normal perfusion, ef 70%  ECHO 3-1-13= 55-60%, mild MR,AR,TR, Aortic sclerosis without stenosis  Cardiac Cath 1-8-14-Successful GORDON pLAD (2.75x18 Xience). RFA mynx, RFV manual.     ROS-except as noted above. . A complete cardiac and respiratory are reviewed and negative except as above ; Resp-denies wheezing  or productive cough,. Const- No unusual weight loss or fever; Neuro-no recent seizure or CVA ; GI- No BRBPR, abdom pain, bloating ; - no  hematuria   see supplement sheet, initialed and to be scanned by staff  Past Medical History:   Diagnosis Date    Anxiety     Arthritis     CAD (coronary artery disease)     [facility] 1/7/2016    Coronary atherosclerosis of native coronary artery 2006    MI/Left ventricular wall motion is segmentally abnormal with mild hypokinesis of the anterior wall.  Ejection Fraction is estimated at 50%. LAD >90% stenosis. LCX 50% stenosis.  Depression     Elevated BP 2/1/2017    Family history of suicide     father    Fatigue     GERD (gastroesophageal reflux disease)     Glaucoma     Headache 1988    migraines    Hypertension, essential 11/1/2017    Incontinence in female     Lung nodule     Menopause     Mixed hyperlipidemia     Osteopenia 2015    DEXA 2010, 2015    S/P angioplasty with stent     stent to LAD. 3X8mm Cypher. Social Hx= reports that she has never smoked. She has never used smokeless tobacco. She reports that she does not drink alcohol or use drugs. Exam and Labs:  BP (!) 118/58 (BP 1 Location: Left arm, BP Patient Position: Sitting)   Pulse 60   Ht 5' 5\" (1.651 m)   Wt 134 lb (60.8 kg)   SpO2 97%   BMI 22.30 kg/m² Constitutional:  NAD, comfortable  Head: NC,AT. Eyes: No scleral icterus. Neck:  Neck supple. No JVD present. Throat: moist mucous membranes. Chest: Effort normal & normal respiratory excursion . Neurological: alert, conversant and oriented . Skin: Skin is not cold. No obvious systemic rash noted. Not diaphoretic. No erythema. Psychiatric:  Grossly normal mood and affect. Behavior appears normal. Extremities:  no clubbing or cyanosis. Abdomen: non distended    Lungs:breath sounds normal. No stridor. distress, wheezes or  Rales. Heart: normal rate, regular rhythm, normal S1, S2, no murmurs, rubs, clicks or gallops , PMI non displaced. Edema: Edema is none.   Lab Results   Component Value Date/Time    Cholesterol, total 115 01/10/2020 09:10 AM    HDL Cholesterol 33 (L) 01/10/2020 09:10 AM    LDL, calculated 57 01/10/2020 09:10 AM    Triglyceride 125 01/10/2020 09:10 AM    CHOL/HDL Ratio 3.8 01/09/2014 04:20 AM     Lab Results   Component Value Date/Time    Sodium 140 05/27/2020 12:08 PM    Potassium 4.3 05/27/2020 12:08 PM    Chloride 108 05/27/2020 12:08 PM    CO2 29 05/27/2020 12:08 PM    Anion gap 3 (L) 05/27/2020 12:08 PM    Glucose 102 (H) 05/27/2020 12:08 PM    BUN 18 05/27/2020 12:08 PM    Creatinine 0.77 05/27/2020 12:08 PM    BUN/Creatinine ratio 23 (H) 05/27/2020 12:08 PM    GFR est AA >60 05/27/2020 12:08 PM    GFR est non-AA >60 05/27/2020 12:08 PM    Calcium 9.0 05/27/2020 12:08 PM      Wt Readings from Last 3 Encounters:   01/27/21 134 lb (60.8 kg)   05/27/20 131 lb (59.4 kg)   01/15/20 131 lb (59.4 kg)      BP Readings from Last 3 Encounters:   01/27/21 (!) 118/58   05/27/20 152/48   01/15/20 112/60      Current Outpatient Medications   Medication Sig    brimonidine (ALPHAGAN) 0.2 % ophthalmic solution Administer 1 Drop to both eyes two (2) times a day.  clotrimazole-betamethasone (LOTRISONE) topical cream as needed.  amLODIPine (NORVASC) 5 mg tablet TAKE 1 TABLET EVERY DAY    metoprolol tartrate (LOPRESSOR) 25 mg tablet Take 1 Tab by mouth two (2) times a day.  simvastatin (ZOCOR) 40 mg tablet Take 1 Tab by mouth nightly.  pantoprazole (PROTONIX) 40 mg tablet Take 40 mg by mouth daily.  aspirin delayed-release 81 mg tablet Take  by mouth daily.  citalopram (CELEXA) 20 mg tablet Take  by mouth daily.  calcium-cholecalciferol, d3, (CALCIUM 600 + D) 600-125 mg-unit tab Take 1 Tab by mouth two (2) times a day.  latanoprost (XALATAN) 0.005 % ophthalmic solution Administer 1 Drop to both eyes nightly. No current facility-administered medications for this visit. Impression see above.       Written by Elisabeth Echols, as dictated by Fallon William MD.

## 2021-01-27 NOTE — PATIENT INSTRUCTIONS
Please have your fasting blood work completed. We will call with results and see you back for an annual follow up.

## 2021-01-27 NOTE — Clinical Note
1/27/2021 Patient: Lennox Abebe YOB: 1945 Date of Visit: 1/27/2021 Janiya Carlin MD 
[address] [id number] [address] [employer] Dear Janiya Carlin MD, Thank you for referring Ms. Jensen Head to [address]  [facility] for evaluation. My notes for this consultation are attached. If you have questions, please do not hesitate to call me. I look forward to following your patient along with you.  
 
 
Sincerely, 
 
Kavon Menchaca MD

## 2021-01-27 NOTE — PROGRESS NOTES
Room 4     Visit Vitals  BP (!) 118/58 (BP 1 Location: Left arm, BP Patient Position: Sitting)   Pulse 60   Ht 5' 5\" (1.651 m)   Wt 134 lb (60.8 kg)   SpO2 97%   BMI 22.30 kg/m²       Chest pain: no  Shortness of breath: no  Edema: no  Palpitations: no  Dizziness: no    New diagnosis/Surgeries: no    ER/Hospitalizations: no

## 2021-02-05 LAB
ALBUMIN SERPL-MCNC: 4.3 G/DL (ref 3.7–4.7)
ALP SERPL-CCNC: 57 IU/L (ref 39–117)
ALT SERPL-CCNC: 12 IU/L (ref 0–32)
AST SERPL-CCNC: 16 IU/L (ref 0–40)
BILIRUB DIRECT SERPL-MCNC: 0.19 MG/DL (ref 0–0.4)
BILIRUB SERPL-MCNC: 0.7 MG/DL (ref 0–1.2)
CHOLEST SERPL-MCNC: 131 MG/DL (ref 100–199)
HDLC SERPL-MCNC: 40 MG/DL
INTERPRETATION, 910389: NORMAL
LDLC SERPL CALC-MCNC: 75 MG/DL (ref 0–99)
PROT SERPL-MCNC: 5.9 G/DL (ref 6–8.5)
TRIGL SERPL-MCNC: 81 MG/DL (ref 0–149)
VLDLC SERPL CALC-MCNC: 16 MG/DL (ref 5–40)

## 2021-02-09 NOTE — PROGRESS NOTES
Has CAD and we did a fasting lipid to follow-up cholesterol numbers lookgood  Let her know FLP is fine  Continue Zocor

## 2021-04-01 DIAGNOSIS — I10 HYPERTENSION, ESSENTIAL: ICD-10-CM

## 2021-04-01 DIAGNOSIS — E78.2 MIXED HYPERLIPIDEMIA: ICD-10-CM

## 2021-04-01 DIAGNOSIS — I25.10 CORONARY ARTERY DISEASE INVOLVING NATIVE CORONARY ARTERY OF NATIVE HEART WITHOUT ANGINA PECTORIS: Primary | ICD-10-CM

## 2021-04-06 RX ORDER — METOPROLOL TARTRATE 25 MG/1
25 TABLET, FILM COATED ORAL 2 TIMES DAILY
Qty: 180 TAB | Refills: 3 | Status: SHIPPED | OUTPATIENT
Start: 2021-04-06 | End: 2022-02-08

## 2021-04-06 RX ORDER — SIMVASTATIN 40 MG/1
40 TABLET, FILM COATED ORAL
Qty: 90 TAB | Refills: 3 | Status: SHIPPED | OUTPATIENT
Start: 2021-04-06 | End: 2022-05-05

## 2021-08-04 DIAGNOSIS — E78.2 MIXED HYPERLIPIDEMIA: ICD-10-CM

## 2021-08-07 DIAGNOSIS — I10 HYPERTENSION, ESSENTIAL: ICD-10-CM

## 2021-08-09 RX ORDER — AMLODIPINE BESYLATE 5 MG/1
5 TABLET ORAL DAILY
Qty: 90 TABLET | Refills: 3 | Status: SHIPPED | OUTPATIENT
Start: 2021-08-09 | End: 2022-08-01

## 2021-10-14 ENCOUNTER — TELEPHONE (OUTPATIENT)
Dept: CARDIOLOGY CLINIC | Age: 76
End: 2021-10-14

## 2021-10-14 NOTE — TELEPHONE ENCOUNTER
Received fax from [facility] Dr. Debora Siddiqi office for cardiac and [address] clearance prior to an EGD with MAC.

## 2021-10-28 ENCOUNTER — TRANSCRIBE ORDER (OUTPATIENT)
Dept: SCHEDULING | Age: 76
End: 2021-10-28

## 2021-10-28 ENCOUNTER — HOSPITAL ENCOUNTER (OUTPATIENT)
Dept: MAMMOGRAPHY | Age: 76
Discharge: HOME | End: 2021-10-28
Attending: FAMILY MEDICINE
Payer: MEDICARE

## 2021-10-28 DIAGNOSIS — Z12.31 SCREENING MAMMOGRAM FOR HIGH-RISK PATIENT: ICD-10-CM

## 2021-10-28 DIAGNOSIS — Z12.31 SCREENING MAMMOGRAM FOR HIGH-RISK PATIENT: Primary | ICD-10-CM

## 2021-10-28 PROCEDURE — 77067 SCR MAMMO BI INCL CAD: CPT

## 2022-01-10 ENCOUNTER — TRANSCRIBE ORDER (OUTPATIENT)
Dept: REGISTRATION | Age: 77
End: 2022-01-10

## 2022-01-10 ENCOUNTER — HOSPITAL ENCOUNTER (OUTPATIENT)
Dept: PREADMISSION TESTING | Age: 77
Discharge: HOME | End: 2022-01-10
Attending: INTERNAL MEDICINE
Payer: MEDICARE

## 2022-01-10 DIAGNOSIS — Z01.812 PRE-PROCEDURE LAB EXAM: Primary | ICD-10-CM

## 2022-01-10 DIAGNOSIS — Z01.812 PRE-PROCEDURE LAB EXAM: ICD-10-CM

## 2022-01-10 LAB
SARS-COV-2, XPLCVT: NOT DETECTED
SOURCE, COVRS: NORMAL

## 2022-01-10 PROCEDURE — U0005 INFEC AGEN DETEC AMPLI PROBE: HCPCS

## 2022-01-11 ENCOUNTER — ANESTHESIA EVENT (OUTPATIENT)
Dept: ENDOSCOPY | Age: 77
End: 2022-01-11
Payer: MEDICARE

## 2022-01-11 ENCOUNTER — ANESTHESIA (OUTPATIENT)
Dept: ENDOSCOPY | Age: 77
End: 2022-01-11
Payer: MEDICARE

## 2022-01-11 ENCOUNTER — HOSPITAL ENCOUNTER (OUTPATIENT)
Age: 77
Setting detail: OUTPATIENT SURGERY
Discharge: HOME | End: 2022-01-11
Attending: INTERNAL MEDICINE | Admitting: INTERNAL MEDICINE
Payer: MEDICARE

## 2022-01-11 VITALS
WEIGHT: 135 LBS | BODY MASS INDEX: 22.49 KG/M2 | SYSTOLIC BLOOD PRESSURE: 144 MMHG | RESPIRATION RATE: 11 BRPM | DIASTOLIC BLOOD PRESSURE: 68 MMHG | HEIGHT: 65 IN | OXYGEN SATURATION: 96 % | TEMPERATURE: 97.3 F | HEART RATE: 65 BPM

## 2022-01-11 PROCEDURE — 74011250636 HC RX REV CODE- 250/636: Performed by: NURSE ANESTHETIST, CERTIFIED REGISTERED

## 2022-01-11 PROCEDURE — 76060000031 HC ANESTHESIA FIRST 0.5 HR: Performed by: INTERNAL MEDICINE

## 2022-01-11 PROCEDURE — 2709999900 HC NON-CHARGEABLE SUPPLY: Performed by: INTERNAL MEDICINE

## 2022-01-11 PROCEDURE — 74011250636 HC RX REV CODE- 250/636: Performed by: INTERNAL MEDICINE

## 2022-01-11 PROCEDURE — 76040000019: Performed by: INTERNAL MEDICINE

## 2022-01-11 RX ORDER — PROPOFOL 10 MG/ML
INJECTION, EMULSION INTRAVENOUS AS NEEDED
Status: DISCONTINUED | OUTPATIENT
Start: 2022-01-11 | End: 2022-01-11 | Stop reason: HOSPADM

## 2022-01-11 RX ORDER — FENTANYL CITRATE 50 UG/ML
12.5-2 INJECTION, SOLUTION INTRAMUSCULAR; INTRAVENOUS
Status: DISCONTINUED | OUTPATIENT
Start: 2022-01-11 | End: 2022-01-11 | Stop reason: HOSPADM

## 2022-01-11 RX ORDER — DEXTROMETHORPHAN/PSEUDOEPHED 2.5-7.5/.8
1.2 DROPS ORAL
Status: DISCONTINUED | OUTPATIENT
Start: 2022-01-11 | End: 2022-01-11 | Stop reason: HOSPADM

## 2022-01-11 RX ORDER — SODIUM CHLORIDE 9 MG/ML
75 INJECTION, SOLUTION INTRAVENOUS CONTINUOUS
Status: DISCONTINUED | OUTPATIENT
Start: 2022-01-11 | End: 2022-01-11 | Stop reason: HOSPADM

## 2022-01-11 RX ORDER — FLUMAZENIL 0.1 MG/ML
0.2 INJECTION INTRAVENOUS
Status: DISCONTINUED | OUTPATIENT
Start: 2022-01-11 | End: 2022-01-11 | Stop reason: HOSPADM

## 2022-01-11 RX ORDER — SODIUM CHLORIDE 9 MG/ML
INJECTION, SOLUTION INTRAVENOUS
Status: DISCONTINUED | OUTPATIENT
Start: 2022-01-11 | End: 2022-01-11 | Stop reason: HOSPADM

## 2022-01-11 RX ORDER — EPINEPHRINE 0.1 MG/ML
1 INJECTION INTRACARDIAC; INTRAVENOUS
Status: DISCONTINUED | OUTPATIENT
Start: 2022-01-11 | End: 2022-01-11 | Stop reason: HOSPADM

## 2022-01-11 RX ORDER — NALOXONE HYDROCHLORIDE 0.4 MG/ML
0.4 INJECTION, SOLUTION INTRAMUSCULAR; INTRAVENOUS; SUBCUTANEOUS
Status: DISCONTINUED | OUTPATIENT
Start: 2022-01-11 | End: 2022-01-11 | Stop reason: HOSPADM

## 2022-01-11 RX ORDER — MIDAZOLAM HYDROCHLORIDE 1 MG/ML
.25-5 INJECTION, SOLUTION INTRAMUSCULAR; INTRAVENOUS
Status: DISCONTINUED | OUTPATIENT
Start: 2022-01-11 | End: 2022-01-11 | Stop reason: HOSPADM

## 2022-01-11 RX ORDER — SODIUM CHLORIDE 0.9 % (FLUSH) 0.9 %
5-40 SYRINGE (ML) INJECTION EVERY 8 HOURS
Status: DISCONTINUED | OUTPATIENT
Start: 2022-01-11 | End: 2022-01-11 | Stop reason: HOSPADM

## 2022-01-11 RX ORDER — ATROPINE SULFATE 0.1 MG/ML
0.5 INJECTION INTRAVENOUS
Status: DISCONTINUED | OUTPATIENT
Start: 2022-01-11 | End: 2022-01-11 | Stop reason: HOSPADM

## 2022-01-11 RX ORDER — SODIUM CHLORIDE 0.9 % (FLUSH) 0.9 %
5-40 SYRINGE (ML) INJECTION AS NEEDED
Status: DISCONTINUED | OUTPATIENT
Start: 2022-01-11 | End: 2022-01-11 | Stop reason: HOSPADM

## 2022-01-11 RX ADMIN — PROPOFOL 50 MG: 10 INJECTION, EMULSION INTRAVENOUS at 14:56

## 2022-01-11 RX ADMIN — PROPOFOL 50 MG: 10 INJECTION, EMULSION INTRAVENOUS at 14:58

## 2022-01-11 RX ADMIN — PROPOFOL 50 MG: 10 INJECTION, EMULSION INTRAVENOUS at 14:57

## 2022-01-11 RX ADMIN — PROPOFOL 25 MG: 10 INJECTION, EMULSION INTRAVENOUS at 15:00

## 2022-01-11 RX ADMIN — SODIUM CHLORIDE: 900 INJECTION, SOLUTION INTRAVENOUS at 14:40

## 2022-01-11 NOTE — H&P
[address]  [address] [facility], [facility]  [phone]        History and Physical     NAME: Radha Paul   :  1945   MRN:  [id number]         HPI:  Radha Paul is a 64 y.o. female here for EGD for chronic sore throat and reflux. Patient reports PPI helping reflux but still having sore throat and wants to know if this is related to reflux. Past Surgical History:   Procedure Laterality Date    HX CATARACT REMOVAL      HX  SECTION      HX COLONOSCOPY      reportedly normal    HX CORONARY STENT PLACEMENT  [id number]    HX ENDOSCOPY      reportedly normal    HX HEART CATHETERIZATION      Left ventricular wall motion is segmentally abnormal with mild hypokinesis of the anterior wall. Ejection Fraction is estimated at 50%. LAD >90% stenosis. LCX 50% stenosis.  HX HEART CATHETERIZATION      2 stents placed    HX HEENT      sinus    HX HIP REPLACEMENT  2016    HX ORTHOPAEDIC  8/10/15    hip replacement    HX PTCA  2006    stent to LAD. 3X8mm Cypher. [address] [facility] BREAST BIOPSY Left 2010     Past Medical History:   Diagnosis Date    Anxiety     Arthritis     CAD (coronary artery disease)     COPD (chronic obstructive pulmonary disease) ([facility] [address] 2016    Coronary atherosclerosis of native coronary artery 2006    MI/Left ventricular wall motion is segmentally abnormal with mild hypokinesis of the anterior wall. Ejection Fraction is estimated at 50%. LAD >90% stenosis. LCX 50% stenosis.  Depression     Elevated BP 2017    Family history of suicide     father    Fatigue     GERD (gastroesophageal reflux disease)     Glaucoma     Headache 1988    migraines    Hypertension, essential 2017    Incontinence in female     Lung nodule     Menopause     Mixed hyperlipidemia     Osteopenia 2015    DEXA ,     S/P angioplasty with stent     stent to LAD. 3X8mm Cypher.       Social History Tobacco Use    Smoking status: Never Smoker    Smokeless tobacco: Never Used   Substance Use Topics    Alcohol use: No     Alcohol/week: 0.0 standard drinks    Drug use: No     No current facility-administered medications on file prior to encounter. Current Outpatient Medications on File Prior to Encounter   Medication Sig Dispense Refill    amLODIPine (NORVASC) 5 mg tablet Take 1 Tablet by mouth daily. 90 Tablet 3    simvastatin (ZOCOR) 40 mg tablet Take 1 Tab by mouth nightly. 90 Tab 3    metoprolol tartrate (LOPRESSOR) 25 mg tablet Take 1 Tab by mouth two (2) times a day. Indications: thyrotoxicosis 180 Tab 3    pantoprazole (PROTONIX) 40 mg tablet Take 40 mg by mouth daily.  aspirin delayed-release 81 mg tablet Take  by mouth daily.  calcium-cholecalciferol, d3, (CALCIUM 600 + D) 600-125 mg-unit tab Take 1 Tab by mouth two (2) times a day.  brimonidine (ALPHAGAN) 0.2 % ophthalmic solution Administer 1 Drop to both eyes two (2) times a day.  clotrimazole-betamethasone (LOTRISONE) topical cream as needed.  citalopram (CELEXA) 20 mg tablet Take  by mouth daily.  latanoprost (XALATAN) 0.005 % ophthalmic solution Administer 1 Drop to both eyes nightly.        Allergies   Allergen Reactions    Lamictal [Lamotrigine] Anaphylaxis    Lamictal [Lamotrigine] Rash and Itching     Family History   Problem Relation Age of Onset    Suicide Father     Heart Disease Sister          61    Cancer Brother         bladder cancer    Stroke Mother      Current Facility-Administered Medications   Medication Dose Route Frequency    0.9% sodium chloride infusion  75 mL/hr IntraVENous CONTINUOUS    sodium chloride (NS) flush 5-40 mL  5-40 mL IntraVENous Q8H    sodium chloride (NS) flush 5-40 mL  5-40 mL IntraVENous PRN    midazolam (VERSED) injection 0.25-5 mg  0.25-5 mg IntraVENous Multiple    fentaNYL citrate (PF) injection 12.5-200 mcg  12.5-200 mcg IntraVENous Multiple    naloxone (NARCAN) injection 0.4 mg  0.4 mg IntraVENous Multiple    flumazeniL (ROMAZICON) 0.1 mg/mL injection 0.2 mg  0.2 mg IntraVENous Multiple    simethicone (MYLICON) 18JV/7.3XN oral drops 80 mg  1.2 mL Oral Multiple    atropine injection 0.5 mg  0.5 mg IntraVENous ONCE PRN    EPINEPHrine (ADRENALIN) 0.1 mg/mL syringe 1 mg  1 mg Endoscopically ONCE PRN       PHYSICAL EXAM:    BP (!) 161/63   Pulse 94   Temp 98.8 °F (37.1 °C)   Resp 14   Ht 5' 5\" (1.651 m)   Wt 61.2 kg (135 lb)   SpO2 99%   Breastfeeding No   BMI 22.47 kg/m²      General: WD, WN. Alert, cooperative, no acute distress    HEENT: NC, Atraumatic. PERRLA, EOMI. Anicteric sclerae. Hard of hearing  Lungs:  CTA Bilaterally. No Wheezing/Rhonchi/Rales. Heart:  Regular  rhythm,  No murmur, No Rubs, No Gallops  Abdomen: Soft, Non distended, Non tender. +Bowel sounds, no HSM  Extremities: No c/c/e  Neurologic:  CN 2-12 gi, Alert and oriented X 3. No acute neurological distress   Psych:   Good insight. Not anxious nor agitated.        Assessment:   · Chronic sore throat and reflux    Plan:   · Endoscopic procedure: EGD  · Anesthesia plan: Monitored Anesthesia Care

## 2022-01-11 NOTE — ANESTHESIA POSTPROCEDURE EVALUATION
Post-Anesthesia Evaluation and Assessment    Patient: Keshav Dewitt MRN: [id number]  SSN: [id number]    YOB: 1945  Age: 64 y.o. Sex: female      I have evaluated the patient and they are stable and ready for discharge from the PACU. Cardiovascular Function/Vital Signs  Visit Vitals  BP (!) 98/53   Pulse 60   Temp 36.3 °C (97.3 °F)   Resp 18   Ht 5' 5\" (1.651 m)   Wt 61.2 kg (135 lb)   SpO2 96%   Breastfeeding No   BMI 22.47 kg/m²       Patient is status post MAC anesthesia for Procedure(s):  ESOPHAGOGASTRODUODENOSCOPY (EGD) pending 1/11. Nausea/Vomiting: None    Postoperative hydration reviewed and adequate. Pain:  Pain Scale 1: Visual (01/11/22 1510)  Pain Intensity 1: 0 (01/11/22 1510)   Managed    Neurological Status: At baseline    Mental Status, Level of Consciousness: Alert and  oriented to person, place, and time    Pulmonary Status:   O2 Device: None (Room air) (01/11/22 1510)   Adequate oxygenation and airway patent    Complications related to anesthesia: None    Post-anesthesia assessment completed. No concerns    Signed By: Nate Lai MD     January 11, 2022              Procedure(s):  ESOPHAGOGASTRODUODENOSCOPY (EGD) pending 1/11. MAC    <BSHSIANPOST>    INITIAL Post-op Vital signs:   Vitals Value Taken Time   BP 98/53 01/11/22 1510   Temp 36.3 °C (97.3 °F) 01/11/22 1510   Pulse 60 01/11/22 1512   Resp 17 01/11/22 1512   SpO2 95 % 01/11/22 1512   Vitals shown include unvalidated device data.

## 2022-01-11 NOTE — DISCHARGE INSTRUCTIONS
[address]  [address] [facility], [address] [place] [address]    EGD DISCHARGE INSTRUCTIONS    Juanito Ocampo  [id number]  1945    Discomfort:  Sore throat- throat lozenges or warm salt water gargle  redness at IV site- apply warm compress to area; if redness or soreness persist- contact your physician  Gaseous discomfort- walking, belching will help relieve any discomfort    DIET  See Below    ACTIVITY  You may resume your normal daily activities   Spend the remainder of the day resting -  avoid any strenuous activity. You may not operate a vehicle for 12 hours  You may not engage in an occupation involving machinery or appliances for rest of today  You may not drink alcoholic beverages for at least 12 hours  Avoid making any critical decisions for at least 24 hour    CALL M.D. ANY SIGN OF   Increasing pain, nausea, vomiting  Abdominal distension (swelling)  New increased bleeding (oral or rectal)  Fever (chills)  Pain in chest area  Bloody discharge from nose or mouth  Shortness of breath    Follow-up Instructions:   Call Dr. Ted Lehman for any questions or problems. Telephone # [phone]    ENDOSCOPY FINDINGS:    Impression:      -Medium sized (about 4 cm) sliding hiatal hernia without esophagitis. -Otherwise normal exam.     Recommendations:  -Resume normal medications  -Continue acid suppression.  -Anti-reflux precautions. -GERD diet: avoid fried and fatty foods. peppermint, chocolate, alcohol, coffee, citrus fruits and juices, tomoato products; avoid lying down for 2 to 3 hours after eating.  -Follow up Dr. Darlin Li in 6-8 weeks    Signed By: Ted Lehman MD     1/11/2022  3:17 PM       Patient Education        Hiatal Hernia: Care Instructions  Your Care Instructions  A hiatal hernia occurs when part of the stomach bulges into the chest cavity. A hiatal hernia may allow stomach acid and juices to back up into the esophagus (acid reflux).  This can cause a feeling of burning, warmth, heat, or pain behind the breastbone. This feeling may often occur after you eat, soon after you lie down, or when you bend forward, and it may come and go. You also may have a sour taste in your mouth. These symptoms are commonly known as heartburn or reflux. But not all hiatal hernias cause symptoms. Follow-up care is a key part of your treatment and safety. Be sure to make and go to all appointments, and call your doctor if you are having problems. It's also a good idea to know your test results and keep a list of the medicines you take. How can you care for yourself at home? · Take your medicines exactly as prescribed. Call your doctor if you think you are having a problem with your medicine. · Do not take aspirin or other nonsteroidal anti-inflammatory drugs (NSAIDs), such as ibuprofen (Advil, Motrin) or naproxen (Aleve), unless your doctor says it is okay. Ask your doctor what you can take for pain. · Your doctor may recommend over-the-counter medicine. For mild or occasional indigestion, antacids such as Tums, Gaviscon, Maalox, or Mylanta may help. Your doctor also may recommend over-the-counter acid reducers, such as famotidine (Pepcid AC), cimetidine (Tagamet HB), or omeprazole (Prilosec). Read and follow all instructions on the label. If you use these medicines often, talk with your doctor. · Change your eating habits. ? It's best to eat several small meals instead of two or three large meals. ? After you eat, wait 2 to 3 hours before you lie down. Late-night snacks aren't a good idea. ? Chocolate, mint, and alcohol can make heartburn worse. They relax the valve between the esophagus and the stomach. ? Spicy foods, foods that have a lot of acid (like tomatoes and oranges), and coffee can make heartburn symptoms worse in some people. If your symptoms are worse after you eat a certain food, you may want to stop eating that food to see if your symptoms get better.   · Do not smoke or chew tobacco.  · If you get heartburn at night, raise the head of your bed 6 to 8 inches by putting the frame on blocks or placing a foam wedge under the head of your mattress. (Adding extra pillows does not work.)  · Do not wear tight clothing around your middle. · Lose weight if you need to. Losing just 5 to 10 pounds can help. When should you call for help? Call your doctor now or seek immediate medical care if:    · You have new or worse belly pain.     · You are vomiting. Watch closely for changes in your health, and be sure to contact your doctor if:    · You have new or worse symptoms of indigestion.     · You have trouble or pain swallowing.     · You are losing weight.     · You do not get better as expected. Where can you learn more? Go to http://www.bautista.com/  Enter T074 in the search box to learn more about \"Hiatal Hernia: Care Instructions. \"  Current as of: February 10, 2021               Content Version: 13.0  © 2006-2021 [employer]. Care instructions adapted under license by [employer] (which disclaims liability or warranty for this information). If you have questions about a medical condition or this instruction, always ask your healthcare professional. [place] [address] any warranty or liability for your use of this information. Learning About Coronavirus [phone])  Coronavirus [phone]): Overview  What is coronavirus (COVID-19)? The coronavirus disease (COVID-19) is caused by a virus. It is an illness that was first found in [place], [place], in December 2019. It has since spread worldwide. The virus can cause fever, cough, and trouble breathing. In severe cases, it can cause pneumonia and make it hard to breathe without help. It can cause death. Coronaviruses are a large group of viruses. They cause the common cold.  They also cause more serious illnesses like Middle East respiratory syndrome (MERS) and severe acute respiratory syndrome (SARS). COVID-19 is caused by a novel coronavirus. That means it's a new type that has not been seen in people before. This virus spreads person-to-person through droplets from coughing and sneezing. It can also spread when you are close to someone who is infected. And it can spread when you touch something that has the virus on it, such as a doorknob or a tabletop. What can you do to protect yourself from coronavirus (COVID-19)? The best way to protect yourself from getting sick is to:  · Avoid areas where there is an outbreak. · Avoid contact with people who may be infected. · Wash your hands often with soap or alcohol-based hand sanitizers. · Avoid crowds and try to stay at least 6 feet away from other people. · Wash your hands often, especially after you cough or sneeze. Use soap and water, and scrub for at least 20 seconds. If soap and water aren't available, use an alcohol-based hand . · Avoid touching your mouth, nose, and eyes. What can you do to avoid spreading the virus to others? To help avoid spreading the virus to others:  · Cover your mouth with a tissue when you cough or sneeze. Then throw the tissue in the trash. · Use a disinfectant to clean things that you touch often. · Stay home if you are sick or have been exposed to the virus. Don't go to school, work, or public areas. And don't use public transportation. · If you are sick:  ? Leave your home only if you need to get medical care. But call the doctor's office first so they know you're coming. And wear a face mask, if you have one.  ? If you have a face mask, wear it whenever you're around other people. It can help stop the spread of the virus when you cough or sneeze. ? Clean and disinfect your home every day. Use household  and disinfectant wipes or sprays. Take special care to clean things that you grab with your hands.  These include doorknobs, remote controls, phones, and handles on your refrigerator and microwave. And don't forget countertops, tabletops, bathrooms, and computer keyboards. When to call for help  Call 911 anytime you think you may need emergency care. For example, call if:  · You have severe trouble breathing. (You can't talk at all.)  · You have constant chest pain or pressure. · You are severely dizzy or lightheaded. · You are confused or can't think clearly. · Your face and lips have a blue color. · You pass out (lose consciousness) or are very hard to wake up. Call your doctor now if you develop symptoms such as:  · Shortness of breath. · Fever. · Cough. If you need to get care, call ahead to the doctor's office for instructions before you go. Make sure you wear a face mask, if you have one, to prevent exposing other people to the virus. Where can you get the latest information? The following health organizations are tracking and studying this virus. Their websites contain the most up-to-date information. Abby Buff also learn what to do if you think you may have been exposed to the virus. · U.S. Centers for Disease Control and Prevention (CDC): The CDC provides updated news about the disease and travel advice. The website also tells you how to prevent the spread of infection. www.cdc.gov  · World Health Organization [facility]): WHO offers information about the virus outbreaks. WHO also has travel advice. www.who.int  Current as of: April 1, 2020               Content Version: 12.4  © 2006-2020 [employer]. Care instructions adapted under license by your healthcare professional. If you have questions about a medical condition or this instruction, always ask your healthcare professional. [address] any warranty or liability for your use of this information.

## 2022-01-11 NOTE — PROCEDURES
[address]  [address] [facility], [address] [facility]  [phone]           Endoscopic Gastroduodenoscopy Procedure Note    Mich May  1945  [id number]    Indication: GERD and sore throat     : Sergio Hickman MD    Staff: Endoscopy Technician-1: Erika Curran  Endoscopy RN-1: Jarad Holguin     Referring Provider:  Debo Gillis MD      Anesthesia/Sedation:  MAC anesthesia      Procedure Details     After infomed consent was obtained for the procedure, with all risks and benefits of procedure explained the patient was taken to the endoscopy suite and placed in the left lateral decubitus position. Following sequential administration of sedation as per above, the endoscope was inserted into the mouth and advanced under direct vision to second portion of the duodenum. A careful inspection was made as the gastroscope was withdrawn, including a retroflexed view of the proximal stomach; findings and interventions are described below. Findings:   Esophagus:normal. No evidence of inflammation, stricture or mass  Stomach: 2-4 cm sliding hiatal hernia, GE junction and z-line between 36 and 38 cm and diaphragmatic hiatus at 40 cm from incisors. Hill grade 3 flap valve. Otherwise normal appearing gastric mucosa. Duodenum/jejunum: normal    Therapies:  none    Specimens: * No specimens in log *            EBL:  None    Complications:  None; patient tolerated the procedure well. Impression:      -Medium sized (about 4 cm) sliding hiatal hernia without esophagitis. -Otherwise normal exam.     Recommendations:  -Resume normal medications  -Continue acid suppression.  -Anti-reflux precautions. -GERD diet: avoid fried and fatty foods.  peppermint, chocolate, alcohol, coffee, citrus fruits and juices, tomoato products; avoid lying down for 2 to 3 hours after eating.  -Follow up Dr. Kiel Clark in 6-8 weeks    Sergio Hickman MD  1/11/2022  3:14 PM

## 2022-01-11 NOTE — ANESTHESIA PREPROCEDURE EVALUATION
Anesthetic History   No history of anesthetic complications            Review of Systems / Medical History  Patient summary reviewed, nursing notes reviewed and pertinent labs reviewed    Pulmonary    COPD               Neuro/Psych         Psychiatric history    Comments: +anxiety/depression Cardiovascular    Hypertension          Past MI, CAD, cardiac stents and hyperlipidemia    Exercise tolerance: >4 METS  Comments: LVEF 50%   GI/Hepatic/Renal     GERD: well controlled           Endo/Other        Arthritis     Other Findings              Physical Exam    Airway  Mallampati: II  TM Distance: < 4 cm  Neck ROM: normal range of motion   Mouth opening: Normal     Cardiovascular  Regular rate and rhythm,  S1 and S2 normal,  no murmur, click, rub, or gallop  Rhythm: regular  Rate: normal         Dental    Dentition: Full upper dentures and Full lower dentures     Pulmonary  Breath sounds clear to auscultation               Abdominal  GI exam deferred       Other Findings            Anesthetic Plan    ASA: 3  Anesthesia type: MAC          Induction: Intravenous  Anesthetic plan and risks discussed with: Patient

## 2022-01-25 LAB
ALBUMIN SERPL-MCNC: 4.1 G/DL (ref 3.7–4.7)
ALP SERPL-CCNC: 54 IU/L (ref 44–121)
ALT SERPL-CCNC: 12 IU/L (ref 0–32)
AST SERPL-CCNC: 17 IU/L (ref 0–40)
BILIRUB DIRECT SERPL-MCNC: 0.16 MG/DL (ref 0–0.4)
BILIRUB SERPL-MCNC: 0.6 MG/DL (ref 0–1.2)
CHOLEST SERPL-MCNC: 134 MG/DL (ref 100–199)
HDLC SERPL-MCNC: 38 MG/DL
IMP & REVIEW OF LAB RESULTS: NORMAL
LDLC SERPL CALC-MCNC: 73 MG/DL (ref 0–99)
PROT SERPL-MCNC: 5.8 G/DL (ref 6–8.5)
TRIGL SERPL-MCNC: 129 MG/DL (ref 0–149)
VLDLC SERPL CALC-MCNC: 23 MG/DL (ref 5–40)

## 2022-02-02 ENCOUNTER — HOSPITAL ENCOUNTER (OUTPATIENT)
Dept: GENERAL RADIOLOGY | Age: 77
Discharge: HOME | End: 2022-02-02
Payer: MEDICARE

## 2022-02-02 ENCOUNTER — OFFICE VISIT (OUTPATIENT)
Dept: CARDIOLOGY CLINIC | Age: 77
End: 2022-02-02
Payer: MEDICARE

## 2022-02-02 VITALS
SYSTOLIC BLOOD PRESSURE: 136 MMHG | WEIGHT: 134.2 LBS | HEIGHT: 65 IN | OXYGEN SATURATION: 99 % | HEART RATE: 66 BPM | BODY MASS INDEX: 22.36 KG/M2 | DIASTOLIC BLOOD PRESSURE: 76 MMHG

## 2022-02-02 DIAGNOSIS — J44.9 CHRONIC OBSTRUCTIVE PULMONARY DISEASE, UNSPECIFIED COPD TYPE (HCC): ICD-10-CM

## 2022-02-02 DIAGNOSIS — I25.10 CORONARY ARTERY DISEASE INVOLVING NATIVE CORONARY ARTERY OF NATIVE HEART WITHOUT ANGINA PECTORIS: ICD-10-CM

## 2022-02-02 DIAGNOSIS — I10 HYPERTENSION, ESSENTIAL: ICD-10-CM

## 2022-02-02 DIAGNOSIS — E78.2 MIXED HYPERLIPIDEMIA: ICD-10-CM

## 2022-02-02 DIAGNOSIS — I25.110 CORONARY ARTERY DISEASE INVOLVING NATIVE CORONARY ARTERY OF NATIVE HEART WITH UNSTABLE ANGINA PECTORIS (HCC): Primary | ICD-10-CM

## 2022-02-02 PROCEDURE — 99214 OFFICE O/P EST MOD 30 MIN: CPT | Performed by: SPECIALIST

## 2022-02-02 PROCEDURE — G8427 DOCREV CUR MEDS BY ELIG CLIN: HCPCS | Performed by: SPECIALIST

## 2022-02-02 PROCEDURE — G8420 CALC BMI NORM PARAMETERS: HCPCS | Performed by: SPECIALIST

## 2022-02-02 PROCEDURE — 1101F PT FALLS ASSESS-DOCD LE1/YR: CPT | Performed by: SPECIALIST

## 2022-02-02 PROCEDURE — 93000 ELECTROCARDIOGRAM COMPLETE: CPT | Performed by: SPECIALIST

## 2022-02-02 PROCEDURE — 1090F PRES/ABSN URINE INCON ASSESS: CPT | Performed by: SPECIALIST

## 2022-02-02 PROCEDURE — 71046 X-RAY EXAM CHEST 2 VIEWS: CPT

## 2022-02-02 PROCEDURE — G8536 NO DOC ELDER MAL SCRN: HCPCS | Performed by: SPECIALIST

## 2022-02-02 PROCEDURE — G8754 DIAS BP LESS 90: HCPCS | Performed by: SPECIALIST

## 2022-02-02 PROCEDURE — G9717 DOC PT DX DEP/BP F/U NT REQ: HCPCS | Performed by: SPECIALIST

## 2022-02-02 PROCEDURE — G8752 SYS BP LESS 140: HCPCS | Performed by: SPECIALIST

## 2022-02-02 RX ORDER — CLOBETASOL PROPIONATE 0.5 MG/G
CREAM TOPICAL
COMMUNITY
Start: 2021-11-29

## 2022-02-02 NOTE — Clinical Note
2/2/2022    Patient: Jimmie Howard   YOB: 1945   Date of Visit: 2/2/2022     Alexus Solano MD  [facility] [address]  [address] [facility] [address]  Via Fax: [phone]    Dear Alexus Solano MD,      Thank you for referring Ms. Adrienne Mcclendon to [facility] for evaluation. My notes for this consultation are attached. If you have questions, please do not hesitate to call me. I look forward to following your patient along with you.       Sincerely,    Magdy Manriquez MD

## 2022-02-02 NOTE — PROGRESS NOTES
Ciaran Smith     1945       Ofelia Thacker MD, [facility] [place]  Date of Visit-2/2/2022   PCP is Hima Boucher MD   [facility] [place]  [facility]  HPI:  Ciaran Smith is a 64 y.o. female   1 year f/u of   CAD and HTN. Lipids show an LDL of 73. Today. .. Last lipids were favorable on previous testing. Pt states that she has a discomfort in the left side of her chest, and notes that it hurts more when she pushes on it. She reports that she has some breathing issues when she lays on her back. Pt believes the discomfort may be more related to pulmonary rather than cardiac. Denies edema, syncope or shortness of breath at rest, has no tachycardia, palpitations or sense of arrhythmia. EKG: SR WNL normal axis and intervals     Assessment/Plan:         1. Coronary artery disease involving native coronary artery of native heart with  angina pectoris  Cath 2006, GORDON 2007 to proximal LAD. Normal stress echo 2019 at 5 minutes. She had pain last year in the mid chest, and she seems to have a different pain along the left side sometimes reproducible with palpation to her. Not exertional.   Will get stress nuke and echo. She has a hx of lung disease so will get chest X-ray. She had a mammogram in October.   - AMB POC EKG ROUTINE W/ 12 LEADS, INTER & REP    2. Mixed hyperlipidemia  At goal , denies excess muscle aches or new liver issues  Key Antihyperlipidemia Meds             simvastatin (ZOCOR) 40 mg tablet (Taking) Take 1 Tab by mouth nightly. Lab Results   Component Value Date/Time    LDL, calculated 73 01/24/2022 08:49 AM    LDL, calculated 57 01/10/2020 09:10 AM          3. Hypertension, essential  At goal , meds and possible side effects reviewed and patient denies  Key CAD CHF Meds             amLODIPine (NORVASC) 5 mg tablet (Taking) Take 1 Tablet by mouth daily. simvastatin (ZOCOR) 40 mg tablet (Taking) Take 1 Tab by mouth nightly. metoprolol tartrate (LOPRESSOR) 25 mg tablet (Taking) Take 1 Tab by mouth two (2) times a day. Indications: thyrotoxicosis    aspirin delayed-release 81 mg tablet (Taking) Take  by mouth daily. BP Readings from Last 6 Encounters:   02/02/22 136/76   01/11/22 (!) 144/68   01/27/21 (!) 118/58   05/27/20 152/48   01/15/20 112/60   07/22/19 106/44           4. Chronic obstructive pulmonary disease, unspecified COPD type (HCC)  Chronic SOB unchanged. Will call with results  Patient Instructions   You have been scheduled for an exercise nuclear stress test and an echocardiogram. Please have your chest xray completed today downstairs. We will call with results and see you back in 6 months. Stress Test:    Please arrive 15 minutes prior to your appointment. Wear comfortable clothing and walking or athletic shoes. Do not eat or drink anything, except water, for at least 4 hours prior to your appointment. Avoid tobacco products for at least 12 hours prior to your test.    Do not eat or drink anything containing caffeine, including but not limited to the following: chocolate, regular and decaffeinated coffee, soft drinks, or tea for at least 24 hours prior to your test.    Do not take your metoprolol 24 hours prior to the test but bring it with you. Your test will be performed on a 1 day protocol. This is determined by your height, weight, and other risk factors. For a 1 day test, please allow for 4 hours in the office that day. Impression:   1. Coronary artery disease involving native coronary artery of native heart without angina pectoris    2. Mixed hyperlipidemia    3. Hypertension, essential    4.  Chronic obstructive pulmonary disease, unspecified COPD type ([facility] [address]       Cardiac History:   NUKE 3-1-13= walked 5 minutes, STT 3 mm horizontal, normal perfusion, ef 70%  ECHO 3-1-13= 55-60%, mild MR,AR,TR, Aortic sclerosis without stenosis  Cardiac Cath 1-8-14-Successful GORDON pLAD (2.75x18 Chun). RFA mynx, RFV manual.     Future Appointments   Date Time Provider Calista Bardales   2/18/2022 11:00 AM SONIDO CEVALLOS   2/18/2022 12:30 PM SONIDO HERRERA AMB        Patient Care Team:  Debo Gillis MD as PCP - General (Family Medicine)  Selma Nazario MD (Pulmonary Disease)  Karen Torres (Psychology)  Daniel Delgadillo MD (Cardiology)  Debo Gillis MD (Family Medicine)  Addis Ortiz MD as Physician (Dermatology)    ROS-except as noted above. . A complete cardiac and respiratory are reviewed and negative except as above ; Resp-denies wheezing  or productive cough,. Const- No unusual weight loss or fever; Neuro-no recent seizure or CVA ; GI- No BRBPR, abdom pain, bloating ; - no  hematuria   see supplement sheet, initialed and to be scanned by staff  Past Medical History:   Diagnosis Date    Anxiety     Arthritis     CAD (coronary artery disease)     COPD (chronic obstructive pulmonary disease) ([facility] [address] 1/7/2016    Coronary atherosclerosis of native coronary artery 2006    MI/Left ventricular wall motion is segmentally abnormal with mild hypokinesis of the anterior wall. Ejection Fraction is estimated at 50%. LAD >90% stenosis. LCX 50% stenosis.  Depression     Elevated BP 2/1/2017    Family history of suicide     father    Fatigue     GERD (gastroesophageal reflux disease)     Glaucoma     Headache 1988    migraines    Hypertension, essential 11/1/2017    Incontinence in female     Lung nodule     Menopause     Mixed hyperlipidemia     Osteopenia 2015    DEXA 2010, 2015    S/P angioplasty with stent     stent to LAD. 3X8mm Cypher. Social Hx= reports that she has never smoked. She has never used smokeless tobacco. She reports that she does not drink alcohol and does not use drugs.      Exam and Labs:  /76 (BP 1 Location: Left upper arm, BP Patient Position: Sitting)   Pulse 66   Ht 5' 5\" (1.651 m)   Wt 134 lb 3.2 oz (60.9 kg)   SpO2 99%   BMI 22.33 kg/m² Constitutional:  NAD, comfortable  Head: NC,AT. Eyes: No scleral icterus. Neck:  Neck supple. No JVD present. Throat: moist mucous membranes. Chest: Effort normal & normal respiratory excursion . Neurological: alert, conversant and oriented . Skin: Skin is not cold. No obvious systemic rash noted. Not diaphoretic. No erythema. Psychiatric:  Grossly normal mood and affect. Behavior appears normal. Extremities:  no clubbing or cyanosis. Abdomen: non distended    Lungs:breath sounds normal. No stridor. distress, wheezes or  Rales. Heart: normal rate, regular rhythm, normal S1, S2, no murmurs, rubs, clicks or gallops , PMI non displaced. Edema: Edema is none. Lab Results   Component Value Date/Time    Cholesterol, total 134 01/24/2022 08:49 AM    HDL Cholesterol 38 (L) 01/24/2022 08:49 AM    LDL, calculated 73 01/24/2022 08:49 AM    LDL, calculated 57 01/10/2020 09:10 AM    Triglyceride 129 01/24/2022 08:49 AM    CHOL/HDL Ratio 3.8 01/09/2014 04:20 AM     Lab Results   Component Value Date/Time    Sodium 140 05/27/2020 12:08 PM    Potassium 4.3 05/27/2020 12:08 PM    Chloride 108 05/27/2020 12:08 PM    CO2 29 05/27/2020 12:08 PM    Anion gap 3 (L) 05/27/2020 12:08 PM    Glucose 102 (H) 05/27/2020 12:08 PM    BUN 18 05/27/2020 12:08 PM    Creatinine 0.77 05/27/2020 12:08 PM    BUN/Creatinine ratio 23 (H) 05/27/2020 12:08 PM    GFR est AA >60 05/27/2020 12:08 PM    GFR est non-AA >60 05/27/2020 12:08 PM    Calcium 9.0 05/27/2020 12:08 PM      Wt Readings from Last 3 Encounters:   02/02/22 134 lb 3.2 oz (60.9 kg)   01/11/22 135 lb (61.2 kg)   01/27/21 134 lb (60.8 kg)      BP Readings from Last 3 Encounters:   02/02/22 136/76   01/11/22 (!) 144/68   01/27/21 (!) 118/58      Current Outpatient Medications   Medication Sig    clobetasoL (TEMOVATE) 0.05 % topical cream Apply  to affected area.  amLODIPine (NORVASC) 5 mg tablet Take 1 Tablet by mouth daily.     simvastatin (ZOCOR) 40 mg tablet Take 1 Tab by mouth nightly.  metoprolol tartrate (LOPRESSOR) 25 mg tablet Take 1 Tab by mouth two (2) times a day. Indications: thyrotoxicosis    brimonidine (ALPHAGAN) 0.2 % ophthalmic solution Administer 1 Drop to both eyes two (2) times a day.  pantoprazole (PROTONIX) 40 mg tablet Take 40 mg by mouth daily.  aspirin delayed-release 81 mg tablet Take  by mouth daily.  citalopram (CELEXA) 20 mg tablet Take  by mouth daily.  calcium-cholecalciferol, d3, (CALCIUM 600 + D) 600-125 mg-unit tab Take 1 Tab by mouth two (2) times a day.  latanoprost (XALATAN) 0.005 % ophthalmic solution Administer 1 Drop to both eyes nightly. No current facility-administered medications for this visit. Impression see above.       Written by Jean Duckworth, as dictated by Aminah Carr MD.

## 2022-02-02 NOTE — PROGRESS NOTES
Room 4    Visit Vitals  /76 (BP 1 Location: Left upper arm, BP Patient Position: Sitting)   Pulse 66   Ht 5' 5\" (1.651 m)   Wt 134 lb 3.2 oz (60.9 kg)   SpO2 99%   BMI 22.33 kg/m²       Chest pain:tenderness x 2-3 weeks  Shortness of breath: no  Edema: no  Palpitations: no  Dizziness: no    New diagnosis/Surgeries: no    ER/Hospitalizations: no    Refills: all 3

## 2022-02-02 NOTE — PATIENT INSTRUCTIONS
You have been scheduled for an exercise nuclear stress test and an echocardiogram. Please have your chest xray completed today downstairs. We will call with results and see you back in 6 months. Stress Test:    Please arrive 15 minutes prior to your appointment. Wear comfortable clothing and walking or athletic shoes. Do not eat or drink anything, except water, for at least 4 hours prior to your appointment. Avoid tobacco products for at least 12 hours prior to your test.    Do not eat or drink anything containing caffeine, including but not limited to the following: chocolate, regular and decaffeinated coffee, soft drinks, or tea for at least 24 hours prior to your test.    Do not take your metoprolol 24 hours prior to the test but bring it with you. Your test will be performed on a 1 day protocol. This is determined by your height, weight, and other risk factors. For a 1 day test, please allow for 4 hours in the office that day.

## 2022-02-05 DIAGNOSIS — I25.10 CORONARY ARTERY DISEASE INVOLVING NATIVE CORONARY ARTERY OF NATIVE HEART WITHOUT ANGINA PECTORIS: ICD-10-CM

## 2022-02-05 DIAGNOSIS — I10 HYPERTENSION, ESSENTIAL: ICD-10-CM

## 2022-02-08 RX ORDER — METOPROLOL TARTRATE 25 MG/1
TABLET, FILM COATED ORAL
Qty: 180 TABLET | Refills: 1 | Status: SHIPPED | OUTPATIENT
Start: 2022-02-08 | End: 2022-09-19

## 2022-02-08 NOTE — TELEPHONE ENCOUNTER
Requested Prescriptions     Signed Prescriptions Disp Refills    metoprolol tartrate (LOPRESSOR) 25 mg tablet 180 Tablet 1     Sig: TAKE 1 TABLET TWICE DAILY FOR THYROTOXICOSIS     Authorizing Provider: Opal Bumpers     Ordering User: Jonathan Gates     Verbal order per Dr. Ashley Madden.    Future Appointments   Date Time Provider Calista Bardales   2/18/2022 11:00 AM SONIDO CEVALLOS   2/18/2022 12:30 PM SONIDO HERRERA AMB

## 2022-02-18 ENCOUNTER — ANCILLARY PROCEDURE (OUTPATIENT)
Dept: CARDIOLOGY CLINIC | Age: 77
End: 2022-02-18

## 2022-02-18 ENCOUNTER — ANCILLARY PROCEDURE (OUTPATIENT)
Dept: CARDIOLOGY CLINIC | Age: 77
End: 2022-02-18
Payer: MEDICARE

## 2022-02-18 VITALS — BODY MASS INDEX: 22.33 KG/M2 | WEIGHT: 134 LBS | HEIGHT: 65 IN

## 2022-02-18 VITALS
HEIGHT: 65 IN | SYSTOLIC BLOOD PRESSURE: 138 MMHG | DIASTOLIC BLOOD PRESSURE: 60 MMHG | BODY MASS INDEX: 22.33 KG/M2 | WEIGHT: 134 LBS

## 2022-02-18 DIAGNOSIS — I25.110 CORONARY ARTERY DISEASE INVOLVING NATIVE CORONARY ARTERY OF NATIVE HEART WITH UNSTABLE ANGINA PECTORIS (HCC): ICD-10-CM

## 2022-02-18 LAB
NUC STRESS EJECTION FRACTION: 73 %
STRESS ANGINA INDEX: 0
STRESS BASELINE DIAS BP: 72 MMHG
STRESS BASELINE HR: 81 BPM
STRESS BASELINE ST DEPRESSION: 0 MM
STRESS BASELINE SYS BP: 164 MMHG
STRESS ESTIMATED WORKLOAD: 6.6 METS
STRESS EXERCISE DUR MIN: 4 MIN
STRESS EXERCISE DUR SEC: 46 SEC
STRESS O2 SAT PEAK: 97 %
STRESS O2 SAT REST: 98 %
STRESS PEAK DIAS BP: 78 MMHG
STRESS PEAK SYS BP: 230 MMHG
STRESS PERCENT HR ACHIEVED: 143 %
STRESS POST PEAK HR: 203 BPM
STRESS RATE PRESSURE PRODUCT: NORMAL BPM*MMHG
STRESS SR DUKE TREADMILL SCORE: -5
STRESS ST DEPRESSION: 2 MM
STRESS TARGET HR: 142 BPM

## 2022-02-18 PROCEDURE — 93015 CV STRESS TEST SUPVJ I&R: CPT | Performed by: INTERNAL MEDICINE

## 2022-02-18 PROCEDURE — 78452 HT MUSCLE IMAGE SPECT MULT: CPT | Performed by: INTERNAL MEDICINE

## 2022-02-18 PROCEDURE — 93306 TTE W/DOPPLER COMPLETE: CPT | Performed by: SPECIALIST

## 2022-02-18 PROCEDURE — A9500 TC99M SESTAMIBI: HCPCS | Performed by: INTERNAL MEDICINE

## 2022-02-18 RX ORDER — TETRAKIS(2-METHOXYISOBUTYLISOCYANIDE)COPPER(I) TETRAFLUOROBORATE 1 MG/ML
8.3 INJECTION, POWDER, LYOPHILIZED, FOR SOLUTION INTRAVENOUS ONCE
Status: COMPLETED | OUTPATIENT
Start: 2022-02-18 | End: 2022-02-18

## 2022-02-18 RX ORDER — TETRAKIS(2-METHOXYISOBUTYLISOCYANIDE)COPPER(I) TETRAFLUOROBORATE 1 MG/ML
23.4 INJECTION, POWDER, LYOPHILIZED, FOR SOLUTION INTRAVENOUS ONCE
Status: COMPLETED | OUTPATIENT
Start: 2022-02-18 | End: 2022-02-18

## 2022-02-18 RX ADMIN — TETRAKIS(2-METHOXYISOBUTYLISOCYANIDE)COPPER(I) TETRAFLUOROBORATE 23.4 MILLICURIE: 1 INJECTION, POWDER, LYOPHILIZED, FOR SOLUTION INTRAVENOUS at 13:57

## 2022-02-18 RX ADMIN — TETRAKIS(2-METHOXYISOBUTYLISOCYANIDE)COPPER(I) TETRAFLUOROBORATE 8.3 MILLICURIE: 1 INJECTION, POWDER, LYOPHILIZED, FOR SOLUTION INTRAVENOUS at 12:33

## 2022-02-26 LAB
ECHO AO ASC DIAM: 2.9 CM
ECHO AO ASCENDING AORTA INDEX: 1.74 CM/M2
ECHO AO ROOT DIAM: 3.2 CM
ECHO AO ROOT INDEX: 1.92 CM/M2
ECHO AR MAX VEL PISA: 3.8 M/S
ECHO AV AREA PEAK VELOCITY: 2.4 CM2
ECHO AV AREA PEAK VELOCITY: 2.4 CM2
ECHO AV AREA VTI: 2.6 CM2
ECHO AV AREA/BSA VTI: 1.6 CM2/M2
ECHO AV MEAN GRADIENT: 5 MMHG
ECHO AV MEAN VELOCITY: 1.1 M/S
ECHO AV PEAK GRADIENT: 10 MMHG
ECHO AV PEAK VELOCITY: 1.6 M/S
ECHO AV REGURGITANT PHT: 427.4 MILLISECOND
ECHO AV VELOCITY RATIO: 0.81
ECHO AV VTI: 33.2 CM
ECHO EST RA PRESSURE: 3 MMHG
ECHO LA DIAMETER INDEX: 2.22 CM/M2
ECHO LA DIAMETER: 3.7 CM
ECHO LA TO AORTIC ROOT RATIO: 1.16
ECHO LA VOL 2C: 65 ML (ref 22–52)
ECHO LA VOL 4C: 70 ML (ref 22–52)
ECHO LA VOLUME AREA LENGTH: 74 ML
ECHO LA VOLUME INDEX A2C: 39 ML/M2 (ref 16–34)
ECHO LA VOLUME INDEX A4C: 42 ML/M2 (ref 16–34)
ECHO LA VOLUME INDEX AREA LENGTH: 44 ML/M2 (ref 16–34)
ECHO LV E' LATERAL VELOCITY: 10 CM/S
ECHO LV E' SEPTAL VELOCITY: 9 CM/S
ECHO LV EDV A2C: 80 ML
ECHO LV EDV A4C: 87 ML
ECHO LV EDV BP: 88 ML (ref 56–104)
ECHO LV EDV INDEX A4C: 52 ML/M2
ECHO LV EDV INDEX BP: 53 ML/M2
ECHO LV EDV NDEX A2C: 48 ML/M2
ECHO LV EJECTION FRACTION A2C: 61 %
ECHO LV EJECTION FRACTION A4C: 62 %
ECHO LV EJECTION FRACTION BIPLANE: 63 % (ref 55–100)
ECHO LV ESV A2C: 31 ML
ECHO LV ESV A4C: 33 ML
ECHO LV ESV BP: 32 ML (ref 19–49)
ECHO LV ESV INDEX A2C: 19 ML/M2
ECHO LV ESV INDEX A4C: 20 ML/M2
ECHO LV ESV INDEX BP: 19 ML/M2
ECHO LV FRACTIONAL SHORTENING: 34 % (ref 28–44)
ECHO LV INTERNAL DIMENSION DIASTOLE INDEX: 2.63 CM/M2
ECHO LV INTERNAL DIMENSION DIASTOLIC: 4.4 CM (ref 3.9–5.3)
ECHO LV INTERNAL DIMENSION SYSTOLIC INDEX: 1.74 CM/M2
ECHO LV INTERNAL DIMENSION SYSTOLIC: 2.9 CM
ECHO LV IVSD: 0.8 CM (ref 0.6–0.9)
ECHO LV MASS 2D: 109.4 G (ref 67–162)
ECHO LV MASS INDEX 2D: 65.5 G/M2 (ref 43–95)
ECHO LV POSTERIOR WALL DIASTOLIC: 0.8 CM (ref 0.6–0.9)
ECHO LV RELATIVE WALL THICKNESS RATIO: 0.36
ECHO LVOT AREA: 3.1 CM2
ECHO LVOT AV VTI INDEX: 0.84
ECHO LVOT DIAM: 2 CM
ECHO LVOT MEAN GRADIENT: 4 MMHG
ECHO LVOT PEAK GRADIENT: 7 MMHG
ECHO LVOT PEAK VELOCITY: 1.3 M/S
ECHO LVOT STROKE VOLUME INDEX: 52.6 ML/M2
ECHO LVOT SV: 87.9 ML
ECHO LVOT VTI: 28 CM
ECHO MV A VELOCITY: 0.76 M/S
ECHO MV AREA PHT: 2.9 CM2
ECHO MV AREA VTI: 3.9 CM2
ECHO MV E DECELERATION TIME (DT): 260.1 MS
ECHO MV E VELOCITY: 0.73 M/S
ECHO MV E/A RATIO: 0.96
ECHO MV E/E' LATERAL: 7.3
ECHO MV E/E' RATIO (AVERAGED): 7.71
ECHO MV E/E' SEPTAL: 8.11
ECHO MV LVOT VTI INDEX: 0.81
ECHO MV MAX VELOCITY: 0.9 M/S
ECHO MV MEAN GRADIENT: 1 MMHG
ECHO MV MEAN VELOCITY: 0.6 M/S
ECHO MV PEAK GRADIENT: 3 MMHG
ECHO MV PRESSURE HALF TIME (PHT): 75.4 MS
ECHO MV VTI: 22.8 CM
ECHO RIGHT VENTRICULAR SYSTOLIC PRESSURE (RVSP): 35 MMHG
ECHO RV FREE WALL PEAK S': 14 CM/S
ECHO RV INTERNAL DIMENSION: 3.9 CM
ECHO RV TAPSE: 2.3 CM (ref 1.5–2)
ECHO TV REGURGITANT MAX VELOCITY: 2.85 M/S
ECHO TV REGURGITANT PEAK GRADIENT: 32 MMHG

## 2022-03-04 ENCOUNTER — TELEPHONE (OUTPATIENT)
Dept: CARDIOLOGY CLINIC | Age: 77
End: 2022-03-04

## 2022-03-04 DIAGNOSIS — I25.110 CORONARY ARTERY DISEASE INVOLVING NATIVE CORONARY ARTERY OF NATIVE HEART WITH UNSTABLE ANGINA PECTORIS (HCC): Primary | ICD-10-CM

## 2022-03-04 NOTE — PROGRESS NOTES
Two patient identifiers verified. Per MD patient called and given results. We discussed SVT and monitor. Scheduled tele follow up. Patient verbalized understanding and denies any further questions or concerns at this time.      Future Appointments  4/5/2022   11:00 AM   Ofelia Ocampo MD [facility] AMB

## 2022-03-04 NOTE — PROGRESS NOTES
Let the patient know the echo was essentially normal  No problems  She did have some short SVT on her stress test but no ischemia ( read by Dr Jayjay Sanon) so that may be her palps  Get a 7 day loop and then tele visit to go over results  6 months appt needed  No future appointments.    Thank you

## 2022-03-04 NOTE — TELEPHONE ENCOUNTER
----- Message from Alfie De RN sent at 3/4/2022 11:33 AM EST -----  Can you send this young lady a 7 day loop for SVT?  Thanks

## 2022-03-17 ENCOUNTER — TELEPHONE (OUTPATIENT)
Dept: CARDIOLOGY CLINIC | Age: 77
End: 2022-03-17

## 2022-03-18 PROBLEM — I25.10 CORONARY ARTERY DISEASE INVOLVING NATIVE CORONARY ARTERY OF NATIVE HEART WITHOUT ANGINA PECTORIS: Status: ACTIVE | Noted: 2017-11-01

## 2022-03-18 NOTE — TELEPHONE ENCOUNTER
Holter /Loop monitor  Duration/Dates: 7 Day Loop March 9, 2022 to March 15, 2022  Average heart rate: 64  Findings:   No significant arrhythmias  No SVT  No A. fib  Heart rate average 78 with 18% bradycardia that was mild. Holter was done because on stress test she had a short run of SVT. The echo and nuclear stress test were previously reviewed. Given these findings her rhythm is normal and no further testing or changes in therapy need to be done. If she is feeling well , she can just have follow-up 1 year after her last visit. Please reassure the patient that the testing looks great.   Future Appointments   Date Time Provider Calista Bardales   4/5/2022 11:00 AM MD YI Araiza AMB      Can moved appt to one year from last OV if ok with patient

## 2022-03-20 PROBLEM — I10 HYPERTENSION, ESSENTIAL: Status: ACTIVE | Noted: 2017-11-01

## 2022-03-21 NOTE — TELEPHONE ENCOUNTER
Two patient identifiers verified. Per MD patient called and given results. Patient feeling well so we scheduled annual. Patient verbalized understanding and denies any further questions or concerns at this time.      Future Appointments   Date Time Provider Calista Bardales   2/3/2023 10:00 AM Severiano Magana MD [facility] AMB

## 2022-05-02 DIAGNOSIS — E78.2 MIXED HYPERLIPIDEMIA: ICD-10-CM

## 2022-05-05 RX ORDER — SIMVASTATIN 40 MG/1
40 TABLET, FILM COATED ORAL
Qty: 90 TABLET | Refills: 3 | Status: SHIPPED | OUTPATIENT
Start: 2022-05-05

## 2022-05-05 NOTE — TELEPHONE ENCOUNTER
Per VO by MD.    Future Appointments   Date Time Provider [facility]   2/3/2023 10:00 AM Mark Franks MD [facility] AMB

## 2022-07-28 DIAGNOSIS — I10 HYPERTENSION, ESSENTIAL: ICD-10-CM

## 2022-07-28 DIAGNOSIS — I25.110 CORONARY ARTERY DISEASE INVOLVING NATIVE CORONARY ARTERY OF NATIVE HEART WITH UNSTABLE ANGINA PECTORIS (HCC): Primary | ICD-10-CM

## 2022-08-01 RX ORDER — AMLODIPINE BESYLATE 5 MG/1
5 TABLET ORAL DAILY
Qty: 90 TABLET | Refills: 3 | Status: SHIPPED | OUTPATIENT
Start: 2022-08-01

## 2022-08-01 NOTE — TELEPHONE ENCOUNTER
Per VO by MD.    Future Appointments   Date Time Provider [facility]   2/3/2023 10:00 AM Inez Costa MD [facility] AMB

## 2022-09-18 DIAGNOSIS — I25.10 CORONARY ARTERY DISEASE INVOLVING NATIVE CORONARY ARTERY OF NATIVE HEART WITHOUT ANGINA PECTORIS: ICD-10-CM

## 2022-09-18 DIAGNOSIS — I10 HYPERTENSION, ESSENTIAL: ICD-10-CM

## 2022-09-19 RX ORDER — METOPROLOL TARTRATE 25 MG/1
25 TABLET, FILM COATED ORAL 2 TIMES DAILY
Qty: 180 TABLET | Refills: 3 | Status: SHIPPED | OUTPATIENT
Start: 2022-09-19

## 2022-09-19 NOTE — TELEPHONE ENCOUNTER
Per VO by MD.    Future Appointments   Date Time Provider [facility]   2/3/2023 10:00 AM Pato Hurtado MD [facility] AMB

## 2022-10-06 ENCOUNTER — TRANSCRIBE ORDER (OUTPATIENT)
Dept: SCHEDULING | Age: 77
End: 2022-10-06

## 2022-10-06 DIAGNOSIS — Z12.31 VISIT FOR SCREENING MAMMOGRAM: Primary | ICD-10-CM

## 2022-11-01 ENCOUNTER — HOSPITAL ENCOUNTER (OUTPATIENT)
Dept: MAMMOGRAPHY | Age: 77
Discharge: HOME | End: 2022-11-01
Attending: FAMILY MEDICINE
Payer: MEDICARE

## 2022-11-01 DIAGNOSIS — Z12.31 VISIT FOR SCREENING MAMMOGRAM: ICD-10-CM

## 2022-11-01 PROCEDURE — 77067 SCR MAMMO BI INCL CAD: CPT

## 2022-12-03 NOTE — TELEPHONE ENCOUNTER
Verified patient with two types of identifiers. Spoke to patient about labs ordered, confirmed lab lucila and faxed lab slip and received confirmation. Patient verbalized understanding and will call with any other questions. sinus bradycardia

## 2023-02-03 ENCOUNTER — OFFICE VISIT (OUTPATIENT)
Dept: CARDIOLOGY CLINIC | Age: 78
End: 2023-02-03
Payer: MEDICARE

## 2023-02-03 VITALS
HEIGHT: 65 IN | DIASTOLIC BLOOD PRESSURE: 60 MMHG | SYSTOLIC BLOOD PRESSURE: 120 MMHG | WEIGHT: 127 LBS | BODY MASS INDEX: 21.16 KG/M2 | RESPIRATION RATE: 16 BRPM | HEART RATE: 58 BPM | OXYGEN SATURATION: 94 %

## 2023-02-03 DIAGNOSIS — I10 HYPERTENSION, ESSENTIAL: ICD-10-CM

## 2023-02-03 DIAGNOSIS — E78.2 MIXED HYPERLIPIDEMIA: ICD-10-CM

## 2023-02-03 DIAGNOSIS — I25.110 CORONARY ARTERY DISEASE INVOLVING NATIVE CORONARY ARTERY OF NATIVE HEART WITH UNSTABLE ANGINA PECTORIS (HCC): Primary | ICD-10-CM

## 2023-02-03 DIAGNOSIS — J44.9 CHRONIC OBSTRUCTIVE PULMONARY DISEASE, UNSPECIFIED COPD TYPE (HCC): ICD-10-CM

## 2023-02-03 RX ORDER — MUPIROCIN 20 MG/G
OINTMENT TOPICAL
COMMUNITY
Start: 2023-01-31

## 2023-02-03 RX ORDER — TIMOLOL MALEATE 5 MG/ML
SOLUTION/ DROPS OPHTHALMIC
COMMUNITY
Start: 2022-11-10

## 2023-02-03 RX ORDER — CEPHALEXIN 500 MG/1
CAPSULE ORAL
COMMUNITY
Start: 2023-01-31

## 2023-02-03 NOTE — PATIENT INSTRUCTIONS
Stop your Metoprolol    Please have your fasting labs drawn    You will need to follow up in clinic with Dr. Elis Rebollar in 1 year

## 2023-02-03 NOTE — Clinical Note
2/3/2023    Patient: Josafat Sun   YOB: 1945   Date of Visit: 2/3/2023     Lucy De Luna MD  [facility]  [address] [place] [phone]  Via Fax: [phone]    Dear Lucy De Luna MD,      Thank you for referring Ms. Jose Raul Kennedy to [address] [place] [address] for evaluation. My notes for this consultation are attached. If you have questions, please do not hesitate to call me. I look forward to following your patient along with you.       Sincerely,    Carmen Banks MD

## 2023-02-03 NOTE — PROGRESS NOTES
Darius Alonso     1945       Ofelia Baca MD, [facility] [place]  Date of Visit-2/3/2023   PCP is Beatrice Quinteros MD   [facility] [place]  [facility]  HPI:  Darius Alonso is a 76 y.o. female   1 year f/u of   CAD GORDON to LAD 2014   HTN. Lipids show an LDL of 73. Today. .. Last year she had an echo and a stress test.  The echo showed normal LV function. The stress test showed no ischemia but she did have SVT. We did a loop monitor which showed no persistent arrhythmia. Pt had COVID right before Ros[place]. Pt notes tachycardia every once in a while only when upset. Pt notes SOB with COPD. Pt's BP at home when feeling low is typically around 138/60. Normal exam.   Pt denies chest pain, edema, syncope at rest. Has no tachycardia, palpitations, or sense of arrhythmia. EKG: Sinus bradycardia at 58 within normal limits    Assessment/Plan:         Patient Instructions   Stop your Metoprolol    Please have your fasting labs drawn    You will need to follow up in clinic with Dr. Sophie Baca in 1 year     1. Coronary artery disease involving native coronary artery of native heart with  angina pectoris  Cath 2006, GORDON 2007 to proximal LAD. Normal stress echo 2019 at 5 minutes. Stress nuclear 2/18/2022 EF 73% ST depression of 2 mm SVT during exercise. Normal perfusion. 4 minutes 46 seconds  No current angina. Continue aspirin and statin does not need to be on a beta-blocker and now with COPD that seems a bit worse I will stop the beta-blocker. She used to see Dr. Favian Roberts refer back to pulmonary    2. Mixed hyperlipidemia  At goal , denies excess muscle aches or new liver issues  Key Antihyperlipidemia Meds               simvastatin (ZOCOR) 40 mg tablet (Taking) Take 1 Tablet by mouth nightly. Lab Results   Component Value Date/Time    LDL, calculated 73 01/24/2022 08:49 AM    LDL, calculated 57 01/10/2020 09:10 AM          3.  Hypertension, essential  At goal , meds and possible side effects reviewed and patient denies  Key CAD CHF Meds               amLODIPine (NORVASC) 5 mg tablet (Taking) Take 1 Tablet by mouth in the morning. simvastatin (ZOCOR) 40 mg tablet (Taking) Take 1 Tablet by mouth nightly. aspirin delayed-release 81 mg tablet (Taking) Take  by mouth daily. BP Readings from Last 6 Encounters:   02/03/23 120/60   02/18/22 138/60   02/02/22 136/76   01/11/22 (!) 144/68   01/27/21 (!) 118/58   05/27/20 152/48           4. Chronic obstructive pulmonary disease, unspecified COPD type ([facility] [address]  Had COVID that was mild for her a month ago but COPD seems to be progressing a bit. Her EF was normal as well as right ventricle and pulmonary pressures. PASP is 35. Would like her to see pulmonary for reevaluation of COPD. Impression:   1. Coronary artery disease involving native coronary artery of native heart with unstable angina pectoris ([address]    2. Mixed hyperlipidemia    3. Hypertension, essential    4. Chronic obstructive pulmonary disease, unspecified COPD type ([facility] [address]         Cardiac History:    02/18/22 ECHO ADULT COMPLETE   Left Ventricle: Left ventricle size is normal. Normal wall thickness. Normal wall motion. Normal left ventricular systolic function. EF by 2D Simpsons Biplane is 63%. Normal diastolic function. Left Atrium: Left atrium is moderately dilated. Right Atrium: Right atrium is mildly dilated. Aortic Valve: Mild transvalvular regurgitation. Mitral Valve: Mild transvalvular regurgitation. Tricuspid Valve: Mild transvalvular regurgitation. RVSP is 35 mmHg.  02/18/22 NUCLEAR CARDIAC STRESS TEST   Normal stress and rest myocardial perfusion imaging without ischemia or infarction at **% MPHR. Normal LV function with LVEF  73%. EKG demonstrated 2mm ST depression in inferior leads at peak exercise which resolved in recovery.     Short run of SVT @ 146bpm was noted during exercise with worsening of the inferior ST depression of 4mm downsloping noted. There was another episode of SVT @ 136 bpm noted during recovery which spontaneously resolved. No symptoms reported. Normal Functional capacity. Holter /Loop monitor 7 Day Loop March 9, 2022 to March 15, 2022Average heart rate: 64  Findings: No significant arrhythmiasNo SVTNo A. fibHeart rate average 78 with 18% bradycardia that was mild. Holter was done because on stress test she had a short run of SVT. The echo and nuclear stress test were previously reviewed. Given these findings her rhythm is normal and no further testing or changes in therapy need to be done. Cardiac Cath 1-8-14-Successful GORDON LAD (2.75x18 Xience). RFA mynx, RFV manual.     Future Appointments   Date Time Provider Calista Bardales   2/9/2024 10:00 AM Anita Marvin MD [facility] AMB        Patient Care Team:  Shagufta Robert MD as PCP - General (Family Medicine)  Jhonatan Oden MD (Pulmonary Disease)  Carlie Branch (Psychology)  Anita Marvin MD (Cardiovascular Disease Physician)  Shagufta Robert MD (Family Medicine)  Nika Sun MD as Physician (Dermatology Physician)    ROS-except as noted above. . A complete cardiac and respiratory are reviewed and negative except as above ; Resp-denies wheezing  or productive cough,. Const- No unusual weight loss or fever; Neuro-no recent seizure or CVA ; GI- No BRBPR, abdom pain, bloating ; - no  hematuria   see supplement sheet, initialed and to be scanned by staff  Past Medical History:   Diagnosis Date    Anxiety     Arthritis     CAD (coronary artery disease)     COPD (chronic obstructive pulmonary disease) ([facility] [address] 1/7/2016    Coronary atherosclerosis of native coronary artery 2006    MI/Left ventricular wall motion is segmentally abnormal with mild hypokinesis of the anterior wall. Ejection Fraction is estimated at 50%. LAD >90% stenosis. LCX 50% stenosis.      Depression     Elevated BP 2/1/2017    Family history of suicide     father    Fatigue     GERD (gastroesophageal reflux disease)     Glaucoma     Headache 1988    migraines    Hypertension, essential 11/1/2017    Incontinence in female     Lung nodule     Menopause     Mixed hyperlipidemia     Osteopenia 2015    DEXA 2010, 2015    S/P angioplasty with stent     stent to LAD. 3X8mm Cypher. Social Hx= reports that she has never smoked. She has never used smokeless tobacco. She reports that she does not drink alcohol and does not use drugs. Exam and Labs:  /60   Pulse (!) 58   Resp 16   Ht 5' 5\" (1.651 m)   Wt 127 lb (57.6 kg)   SpO2 94%   BMI 21.13 kg/m² Constitutional:  NAD, comfortable  Head: NC,AT. Eyes: No scleral icterus. Neck:  Neck supple. No JVD present. Throat: moist mucous membranes. Chest: Effort normal & normal respiratory excursion . Neurological: alert, conversant and oriented . Skin: Skin is not cold. No obvious systemic rash noted. Not diaphoretic. No erythema. Psychiatric:  Grossly normal mood and affect. Behavior appears normal. Extremities:  no clubbing or cyanosis. Abdomen: non distended    Lungs:breath sounds normal. No stridor. distress, wheezes or  Rales. Heart: normal rate, regular rhythm, normal S1, S2, no murmurs, rubs, clicks or gallops , PMI non displaced. Edema: Edema is none.   Lab Results   Component Value Date/Time    Cholesterol, total 134 01/24/2022 08:49 AM    HDL Cholesterol 38 (L) 01/24/2022 08:49 AM    LDL, calculated 73 01/24/2022 08:49 AM    LDL, calculated 57 01/10/2020 09:10 AM    Triglyceride 129 01/24/2022 08:49 AM    CHOL/HDL Ratio 3.8 01/09/2014 04:20 AM     Lab Results   Component Value Date/Time    Sodium 140 05/27/2020 12:08 PM    Potassium 4.3 05/27/2020 12:08 PM    Chloride 108 05/27/2020 12:08 PM    CO2 29 05/27/2020 12:08 PM    Anion gap 3 (L) 05/27/2020 12:08 PM    Glucose 102 (H) 05/27/2020 12:08 PM    BUN 18 05/27/2020 12:08 PM    Creatinine 0.77 05/27/2020 12:08 PM BUN/Creatinine ratio 23 (H) 05/27/2020 12:08 PM    GFR est AA >60 05/27/2020 12:08 PM    GFR est non-AA >60 05/27/2020 12:08 PM    Calcium 9.0 05/27/2020 12:08 PM      Wt Readings from Last 3 Encounters:   02/03/23 127 lb (57.6 kg)   02/18/22 134 lb (60.8 kg)   02/18/22 134 lb (60.8 kg)      BP Readings from Last 3 Encounters:   02/03/23 120/60   02/18/22 138/60   02/02/22 136/76      Current Outpatient Medications   Medication Sig    cephALEXin (KEFLEX) 500 mg capsule TAKE 1 CAPSULE BY MOUTH THREE TIMES DAILY FOR 7 DAYS    mupirocin (BACTROBAN) 2 % ointment APPLY TOPICALLY TO THE AFFECTED AREA EVERY DAY    timolol (TIMOPTIC) 0.5 % ophthalmic solution     amLODIPine (NORVASC) 5 mg tablet Take 1 Tablet by mouth in the morning. simvastatin (ZOCOR) 40 mg tablet Take 1 Tablet by mouth nightly. clobetasoL (TEMOVATE) 0.05 % topical cream Apply  to affected area. brimonidine (ALPHAGAN) 0.2 % ophthalmic solution Administer 1 Drop to both eyes two (2) times a day. pantoprazole (PROTONIX) 40 mg tablet Take 40 mg by mouth daily. aspirin delayed-release 81 mg tablet Take  by mouth daily. citalopram (CELEXA) 20 mg tablet Take  by mouth daily. calcium-cholecalciferol, d3, 600-125 mg-unit tab Take 1 Tab by mouth two (2) times a day. latanoprost (XALATAN) 0.005 % ophthalmic solution Administer 1 Drop to both eyes nightly. No current facility-administered medications for this visit. Impression see above.      Written by Ashwini Payne, as dictated by Alexei Ann MD.

## 2023-02-13 ENCOUNTER — HOSPITAL ENCOUNTER (EMERGENCY)
Age: 78
Discharge: HOME | End: 2023-02-13
Attending: EMERGENCY MEDICINE
Payer: MEDICARE

## 2023-02-13 ENCOUNTER — APPOINTMENT (OUTPATIENT)
Dept: CT IMAGING | Age: 78
End: 2023-02-13
Attending: EMERGENCY MEDICINE
Payer: MEDICARE

## 2023-02-13 VITALS
SYSTOLIC BLOOD PRESSURE: 138 MMHG | WEIGHT: 130 LBS | RESPIRATION RATE: 16 BRPM | OXYGEN SATURATION: 98 % | HEIGHT: 65 IN | HEART RATE: 66 BPM | DIASTOLIC BLOOD PRESSURE: 71 MMHG | TEMPERATURE: 97.8 F | BODY MASS INDEX: 21.66 KG/M2

## 2023-02-13 DIAGNOSIS — S09.90XA INJURY OF HEAD, INITIAL ENCOUNTER: ICD-10-CM
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
## 2023-02-13 PROCEDURE — 70450 CT HEAD/BRAIN W/O DYE: CPT

## 2023-02-13 PROCEDURE — 99284 EMERGENCY DEPT VISIT MOD MDM: CPT

## 2023-02-13 NOTE — ED PROVIDER NOTES
Pt from home via EMS after GLF. Stepped in trashcan while walking backwards causing her to fall backwards hitting head on cabinet. Unsure if LOC but states she woke up to her grandson helping her up. Denies any pain at this time. Denies cp, sob, headache, visual changes, n/v.  Does not take blood thinning medication. Past Medical History:   Diagnosis Date    Anxiety     Arthritis     CAD (coronary artery disease)     COPD (chronic obstructive pulmonary disease) ([facility] [address] 2016    Coronary atherosclerosis of native coronary artery 2006    MI/Left ventricular wall motion is segmentally abnormal with mild hypokinesis of the anterior wall. Ejection Fraction is estimated at 50%. LAD >90% stenosis. LCX 50% stenosis. Depression     Elevated BP 2017    Family history of suicide     father    Fatigue     GERD (gastroesophageal reflux disease)     Glaucoma     Headache 1988    migraines    Hypertension, essential 2017    Incontinence in female     Lung nodule     Menopause     Mixed hyperlipidemia     Osteopenia 2015    DEXA ,     S/P angioplasty with stent     stent to LAD. 3X8mm Cypher. Past Surgical History:   Procedure Laterality Date    HX CATARACT REMOVAL      HX  SECTION      HX COLONOSCOPY      reportedly normal    HX CORONARY STENT PLACEMENT  8581,9623    HX ENDOSCOPY      reportedly normal    HX HEART CATHETERIZATION  2006    Left ventricular wall motion is segmentally abnormal with mild hypokinesis of the anterior wall. Ejection Fraction is estimated at 50%. LAD >90% stenosis. LCX 50% stenosis. HX HEART CATHETERIZATION  2014    2 stents placed    HX HEENT      sinus    HX HIP REPLACEMENT      HX ORTHOPAEDIC  8/10/15    hip replacement    HX PTCA  2006    stent to LAD. 3X8mm Cypher.      US GUIDED CORE BREAST BIOPSY Left          Family History:   Problem Relation Age of Onset    Suicide Father     Heart Disease Sister          61    Cancer Brother         bladder cancer    Stroke Mother        Social History     Socioeconomic History    Marital status:      Spouse name: Not on file    Number of children: Not on file    Years of education: Not on file    Highest education level: Not on file   Occupational History    Not on file   Tobacco Use    Smoking status: Never    Smokeless tobacco: Never   Substance and Sexual Activity    Alcohol use: No     Alcohol/week: 0.0 standard drinks    Drug use: No    Sexual activity: Never   Other Topics Concern    Not on file   Social History Narrative    ** Merged History Encounter **          Social Determinants of Health     Financial Resource Strain: Not on file   Food Insecurity: Not on file   Transportation Needs: Not on file   Physical Activity: Not on file   Stress: Not on file   Social Connections: Not on file   Intimate Partner Violence: Not on file   Housing Stability: Not on file         ALLERGIES: Lamictal [lamotrigine] and Lamictal [lamotrigine]    Review of Systems   Constitutional:  Negative for fever. HENT:  Negative for facial swelling. Eyes:  Negative for visual disturbance. Respiratory:  Negative for chest tightness. Cardiovascular:  Negative for chest pain. Gastrointestinal:  Negative for abdominal pain. Genitourinary:  Negative for difficulty urinating and dysuria. Musculoskeletal:  Negative for arthralgias. Skin:  Negative for rash. Neurological:  Negative for headaches. Hematological:  Negative for adenopathy. Psychiatric/Behavioral:  Negative for suicidal ideas. Vitals:    02/13/23 1041   BP: 138/71   Pulse: 66   Resp: 16   Temp: 97.8 °F (36.6 °C)   SpO2: 98%   Weight: 59 kg (130 lb)   Height: 5' 5\" (1.651 m)            Physical Exam  Vitals and nursing note reviewed. Constitutional:       General: She is not in acute distress. Appearance: She is well-developed. HENT:      Head: Normocephalic and atraumatic. Eyes:      General: No scleral icterus. Conjunctiva/sclera: Conjunctivae normal.      Pupils: Pupils are equal, round, and reactive to light. Cardiovascular:      Rate and Rhythm: Normal rate. Heart sounds: No murmur heard. Pulmonary:      Effort: Pulmonary effort is normal. No respiratory distress. Abdominal:      General: There is no distension. Musculoskeletal:         General: Normal range of motion. Cervical back: Normal range of motion and neck supple. Skin:     General: Skin is warm and dry. Findings: No rash. Neurological:      Mental Status: She is alert and oriented to person, place, and time. Medical Decision Making  A;  head injury after GLF. VS normal.  Exam reassuring. HCT neg for acute abnormality. Pt stable for discharge home. RTED for any vomiting, pain, worsening symptoms. Amount and/or Complexity of Data Reviewed  Radiology: ordered. CT images independently reviewed and interpreted by me. Awaiting final radiology report before making treatment and disposition plan.     My Findings: Head: No acute injuries         Procedures

## 2023-02-13 NOTE — ED TRIAGE NOTES
Pt arrives to the ER for complaints of fall. Pt was cleaning when she stepped backwards, lost her footing and hit her head on a cabinet. EMS reports that patient seemed confused for a minute. Pt then returned to baseline after a few minutes. Pt does report some lightheadedness. Pt currently denies any symptoms. PMH of HTN and stent placement.

## 2023-06-23 DIAGNOSIS — E78.2 MIXED HYPERLIPIDEMIA: ICD-10-CM

## 2023-06-23 DIAGNOSIS — I25.110 ATHEROSCLEROTIC HEART DISEASE OF NATIVE CORONARY ARTERY WITH UNSTABLE ANGINA PECTORIS (HCC): Primary | ICD-10-CM

## 2023-06-26 RX ORDER — SIMVASTATIN 40 MG
TABLET ORAL
Qty: 90 TABLET | Refills: 3 | Status: SHIPPED | OUTPATIENT
Start: 2023-06-26

## 2023-09-07 DIAGNOSIS — I10 ESSENTIAL (PRIMARY) HYPERTENSION: Primary | ICD-10-CM

## 2023-09-07 RX ORDER — AMLODIPINE BESYLATE 5 MG/1
TABLET ORAL
Qty: 90 TABLET | Refills: 3 | Status: SHIPPED | OUTPATIENT
Start: 2023-09-07

## 2023-09-07 NOTE — TELEPHONE ENCOUNTER
Per VO by MD.    Future Appointments   Date Time Provider [address]   2/9/2024 10:00 AM Tay Mon MD [facility] AMB

## 2023-10-29 NOTE — PROGRESS NOTES
10/29/23  12:16 PM    Sun Alfred  Sepsis, due to unspecified organism, unspecified whether acute organ dysfunction present (CMD)  (primary encounter diagnosis)  Choledocholithiasis    Subjective   She is currently without complaints.  She denies abdominal pain.  She is tolerating her diet.  Objective     I/O's    Intake/Output Summary (Last 24 hours) at 10/29/2023 1216  Last data filed at 10/29/2023 0602  Gross per 24 hour   Intake 516.89 ml   Output --   Net 516.89 ml       Last Recorded Vitals  Vitals:    10/28/23 2255 10/29/23 0527 10/29/23 0711 10/29/23 1104   BP: (!) 142/80 108/75 110/55 124/77   BP Location: LUE - Left upper extremity RUE - Right upper extremity LUE - Left upper extremity RUE - Right upper extremity   Patient Position:  Supine Supine Sitting   Pulse: 66 62 75 75   Resp: 16 16 17 17   Temp: 98.1 °F (36.7 °C) 98.1 °F (36.7 °C) 98.1 °F (36.7 °C) 97.9 °F (36.6 °C)   TempSrc: Oral Oral Oral Oral   SpO2: 95% 93% 97% 95%   Weight:       Height:           Body mass index is 25.62 kg/m².    Physical Exam:   No acute distress.  Heart is regular rate and rhythm.  Lungs are clear.  Abdomen is soft with minimal incisional tenderness.  Wounds are clean dry and intact evidence of infection.  THOMAS drainage is serosanguineous.      Labs     Recent Results (from the past 24 hour(s))   Comprehensive Metabolic Panel    Collection Time: 10/29/23  5:35 AM   Result Value Ref Range    Fasting Status      Sodium 137 135 - 145 mmol/L    Potassium 4.8 3.4 - 5.1 mmol/L    Chloride 108 97 - 110 mmol/L    Carbon Dioxide 24 21 - 32 mmol/L    Anion Gap 10 7 - 19 mmol/L    Glucose 100 (H) 70 - 99 mg/dL    BUN 6 6 - 20 mg/dL    Creatinine 0.50 (L) 0.51 - 0.95 mg/dL    Glomerular Filtration Rate >90 >=60    BUN/Cr 12 7 - 25    Calcium 8.8 8.4 - 10.2 mg/dL    Bilirubin, Total 0.6 0.2 - 1.0 mg/dL    GOT/AST 29 <=37 Units/L    GPT/ALT 53 <64 Units/L    Alkaline Phosphatase 211 (H) 45 - 117 Units/L    Albumin 2.7 (L) 3.6 -  Reviewed urine culture. Sensitive to Keflex used to treat suspected UTI. 5.1 g/dL    Protein, Total 6.1 (L) 6.4 - 8.2 g/dL    Globulin 3.4 2.0 - 4.0 g/dL    A/G Ratio 0.8 (L) 1.0 - 2.4   Magnesium    Collection Time: 10/29/23  5:35 AM   Result Value Ref Range    Magnesium 2.1 1.7 - 2.4 mg/dL   CBC with Automated Differential (performable only)    Collection Time: 10/29/23  5:35 AM   Result Value Ref Range    WBC 6.9 4.2 - 11.0 K/mcL    RBC 3.88 (L) 4.00 - 5.20 mil/mcL    HGB 10.6 (L) 12.0 - 15.5 g/dL    HCT 32.6 (L) 36.0 - 46.5 %    MCV 84.0 78.0 - 100.0 fl    MCH 27.3 26.0 - 34.0 pg    MCHC 32.5 32.0 - 36.5 g/dL    RDW-CV 13.6 11.0 - 15.0 %    RDW-SD 41.6 39.0 - 50.0 fL     140 - 450 K/mcL    NRBC 0 <=0 /100 WBC    Neutrophil, Percent 57 %    Lymphocytes, Percent 28 %    Mono, Percent 8 %    Eosinophils, Percent 4 %    Basophils, Percent 1 %    Immature Granulocytes 2 %    Absolute Neutrophils 4.0 1.8 - 7.7 K/mcL    Absolute Lymphocytes 1.9 1.0 - 4.0 K/mcL    Absolute Monocytes 0.6 0.3 - 0.9 K/mcL    Absolute Eosinophils  0.3 0.0 - 0.5 K/mcL    Absolute Basophils 0.0 0.0 - 0.3 K/mcL    Absolute Immature Granulocytes 0.1 0.0 - 0.2 K/mcL       Radiology:  No results found.     Assessment/Plan  Status post lap ravin for acute cholecystitis.  Clinically the patient is doing well.  She may be discharged home.  She is to follow-up with Dr. Navarro in 1 week.  She will be discharged on a low-fat diet.  She may shower.      Joni Zarate MD

## 2023-11-20 ENCOUNTER — TRANSCRIBE ORDERS (OUTPATIENT)
Facility: HOSPITAL | Age: 78
End: 2023-11-20

## 2023-11-20 DIAGNOSIS — Z12.31 VISIT FOR SCREENING MAMMOGRAM: Primary | ICD-10-CM

## 2023-12-28 ENCOUNTER — HOSPITAL ENCOUNTER (OUTPATIENT)
Facility: HOSPITAL | Age: 78
Discharge: HOME | End: 2023-12-28
Payer: MEDICARE

## 2023-12-28 VITALS — WEIGHT: 130 LBS | BODY MASS INDEX: 21.63 KG/M2

## 2023-12-28 DIAGNOSIS — Z12.31 VISIT FOR SCREENING MAMMOGRAM: ICD-10-CM

## 2023-12-28 PROCEDURE — 77067 SCR MAMMO BI INCL CAD: CPT

## 2024-01-24 ENCOUNTER — HOSPITAL ENCOUNTER (OUTPATIENT)
Facility: HOSPITAL | Age: 79
Discharge: HOME | End: 2024-01-27
Payer: MEDICARE

## 2024-01-24 DIAGNOSIS — R92.8 ABNORMALITY OF RIGHT BREAST ON SCREENING MAMMOGRAM: ICD-10-CM

## 2024-01-24 PROCEDURE — 76642 ULTRASOUND BREAST LIMITED: CPT

## 2024-01-24 PROCEDURE — G0279 TOMOSYNTHESIS, MAMMO: HCPCS

## 2024-04-12 ENCOUNTER — OFFICE VISIT (OUTPATIENT)
Age: 79
End: 2024-04-12
Payer: MEDICARE

## 2024-04-12 VITALS
BODY MASS INDEX: 20.83 KG/M2 | DIASTOLIC BLOOD PRESSURE: 70 MMHG | HEIGHT: 65 IN | SYSTOLIC BLOOD PRESSURE: 150 MMHG | WEIGHT: 125 LBS

## 2024-04-12 DIAGNOSIS — E78.2 MIXED HYPERLIPIDEMIA: ICD-10-CM

## 2024-04-12 DIAGNOSIS — I25.10 CORONARY ARTERY DISEASE INVOLVING NATIVE CORONARY ARTERY OF NATIVE HEART WITHOUT ANGINA PECTORIS: Primary | ICD-10-CM

## 2024-04-12 DIAGNOSIS — J44.9 CHRONIC OBSTRUCTIVE PULMONARY DISEASE, UNSPECIFIED COPD TYPE (HCC): ICD-10-CM

## 2024-04-12 DIAGNOSIS — I10 ESSENTIAL (PRIMARY) HYPERTENSION: ICD-10-CM

## 2024-04-12 DIAGNOSIS — I25.10 CORONARY ARTERY DISEASE INVOLVING NATIVE CORONARY ARTERY OF NATIVE HEART WITHOUT ANGINA PECTORIS: ICD-10-CM

## 2024-04-12 LAB
ALBUMIN SERPL-MCNC: 3.7 G/DL (ref 3.5–5)
ALBUMIN/GLOB SERPL: 1.7 (ref 1.1–2.2)
ALP SERPL-CCNC: 54 U/L (ref 45–117)
ALT SERPL-CCNC: 19 U/L (ref 12–78)
ANION GAP SERPL CALC-SCNC: 4 MMOL/L (ref 5–15)
AST SERPL-CCNC: 13 U/L (ref 15–37)
BILIRUB SERPL-MCNC: 0.7 MG/DL (ref 0.2–1)
BUN SERPL-MCNC: 14 MG/DL (ref 6–20)
BUN/CREAT SERPL: 18 (ref 12–20)
CALCIUM SERPL-MCNC: 9.2 MG/DL (ref 8.5–10.1)
CHLORIDE SERPL-SCNC: 108 MMOL/L (ref 97–108)
CHOLEST SERPL-MCNC: 124 MG/DL
CO2 SERPL-SCNC: 31 MMOL/L (ref 21–32)
CREAT SERPL-MCNC: 0.8 MG/DL (ref 0.55–1.02)
ERYTHROCYTE [DISTWIDTH] IN BLOOD BY AUTOMATED COUNT: 14.5 % (ref 11.5–14.5)
GLOBULIN SER CALC-MCNC: 2.2 G/DL (ref 2–4)
GLUCOSE SERPL-MCNC: 95 MG/DL (ref 65–100)
HCT VFR BLD AUTO: 37.1 % (ref 35–47)
HDLC SERPL-MCNC: 40 MG/DL
HDLC SERPL: 3.1 (ref 0–5)
HGB BLD-MCNC: 12.1 G/DL (ref 11.5–16)
LDLC SERPL CALC-MCNC: 67 MG/DL (ref 0–100)
MCH RBC QN AUTO: 31.8 PG (ref 26–34)
MCHC RBC AUTO-ENTMCNC: 32.6 G/DL (ref 30–36.5)
MCV RBC AUTO: 97.6 FL (ref 80–99)
NRBC # BLD: 0 K/UL (ref 0–0.01)
NRBC BLD-RTO: 0 PER 100 WBC
PLATELET # BLD AUTO: 176 K/UL (ref 150–400)
PMV BLD AUTO: 11.5 FL (ref 8.9–12.9)
POTASSIUM SERPL-SCNC: 4.6 MMOL/L (ref 3.5–5.1)
PROT SERPL-MCNC: 5.9 G/DL (ref 6.4–8.2)
RBC # BLD AUTO: 3.8 M/UL (ref 3.8–5.2)
SODIUM SERPL-SCNC: 143 MMOL/L (ref 136–145)
TRIGL SERPL-MCNC: 85 MG/DL
TSH SERPL DL<=0.05 MIU/L-ACNC: 1.66 UIU/ML (ref 0.36–3.74)
VLDLC SERPL CALC-MCNC: 17 MG/DL
WBC # BLD AUTO: 7.2 K/UL (ref 3.6–11)

## 2024-04-12 PROCEDURE — 93010 ELECTROCARDIOGRAM REPORT: CPT | Performed by: SPECIALIST

## 2024-04-12 PROCEDURE — 99214 OFFICE O/P EST MOD 30 MIN: CPT | Performed by: SPECIALIST

## 2024-04-12 PROCEDURE — 1036F TOBACCO NON-USER: CPT | Performed by: SPECIALIST

## 2024-04-12 PROCEDURE — 93005 ELECTROCARDIOGRAM TRACING: CPT | Performed by: SPECIALIST

## 2024-04-12 PROCEDURE — 1090F PRES/ABSN URINE INCON ASSESS: CPT | Performed by: SPECIALIST

## 2024-04-12 PROCEDURE — G8427 DOCREV CUR MEDS BY ELIG CLIN: HCPCS | Performed by: SPECIALIST

## 2024-04-12 PROCEDURE — G8399 PT W/DXA RESULTS DOCUMENT: HCPCS | Performed by: SPECIALIST

## 2024-04-12 PROCEDURE — 3077F SYST BP >= 140 MM HG: CPT | Performed by: SPECIALIST

## 2024-04-12 PROCEDURE — G8420 CALC BMI NORM PARAMETERS: HCPCS | Performed by: SPECIALIST

## 2024-04-12 PROCEDURE — 3078F DIAST BP <80 MM HG: CPT | Performed by: SPECIALIST

## 2024-04-12 PROCEDURE — 3023F SPIROM DOC REV: CPT | Performed by: SPECIALIST

## 2024-04-12 PROCEDURE — 1123F ACP DISCUSS/DSCN MKR DOCD: CPT | Performed by: SPECIALIST

## 2024-04-12 NOTE — PROGRESS NOTES
Chief Complaint   Patient presents with    Follow-up     Vitals:    04/12/24 0920 04/12/24 0929   BP: (!) 150/70 (!) 150/70   Site: Left Upper Arm    Position: Sitting    Cuff Size: Medium Adult    Weight: 56.7 kg (125 lb)    Height: 1.651 m (5' 5\")       BP (!) 150/70   Ht 1.651 m (5' 5\")   Wt 56.7 kg (125 lb)   BMI 20.80 kg/m²        Chief Complaint   Patient presents with    Follow-up           
4.2 02/22/2023 11:27 AM     02/22/2023 11:27 AM    CO2 32 02/22/2023 11:27 AM    BUN 14 02/22/2023 11:27 AM    CREATININE 0.63 02/22/2023 11:27 AM    GLUCOSE 73 02/22/2023 11:27 AM    CALCIUM 9.3 02/22/2023 11:27 AM       Wt Readings from Last 3 Encounters:   04/12/24 56.7 kg (125 lb)   12/28/23 59 kg (130 lb)   02/03/23 57.6 kg (127 lb)      BP Readings from Last 3 Encounters:   04/12/24 (!) 150/70   02/03/23 120/60   02/18/22 138/60        Current Outpatient Medications:     amLODIPine (NORVASC) 5 MG tablet, TAKE 1 TABLET EVERY MORNING, Disp: 90 tablet, Rfl: 3    simvastatin (ZOCOR) 40 MG tablet, TAKE 1 TABLET EVERY NIGHT, Disp: 90 tablet, Rfl: 3    aspirin 81 MG EC tablet, Take by mouth daily, Disp: , Rfl:     brimonidine (ALPHAGAN) 0.2 % ophthalmic solution, Apply 1 drop to eye 2 times daily, Disp: , Rfl:     Calcium Carbonate-Vitamin D 600-3.125 MG-MCG TABS, Take 1 tablet by mouth 2 times daily, Disp: , Rfl:     citalopram (CELEXA) 20 MG tablet, Take by mouth daily, Disp: , Rfl:     latanoprost (XALATAN) 0.005 % ophthalmic solution, Apply 1 drop to eye, Disp: , Rfl:     pantoprazole (PROTONIX) 40 MG tablet, Take by mouth daily, Disp: , Rfl:     timolol (TIMOPTIC) 0.5 % ophthalmic solution, ceived the following from [facility]: Outside name: timolol (TIMOPTIC) 0.5 % ophthalmic solution, Disp: , Rfl:    Impression see above.

## 2024-08-28 DIAGNOSIS — E78.2 MIXED HYPERLIPIDEMIA: ICD-10-CM

## 2024-08-28 DIAGNOSIS — I25.110 ATHEROSCLEROTIC HEART DISEASE OF NATIVE CORONARY ARTERY WITH UNSTABLE ANGINA PECTORIS (HCC): ICD-10-CM

## 2024-08-28 RX ORDER — SIMVASTATIN 40 MG
TABLET ORAL
Qty: 90 TABLET | Refills: 2 | Status: SHIPPED | OUTPATIENT
Start: 2024-08-28

## 2024-10-23 ENCOUNTER — HOSPITAL ENCOUNTER (OUTPATIENT)
Facility: HOSPITAL | Age: 79
Discharge: HOME | End: 2024-10-26
Payer: MEDICARE

## 2024-10-23 DIAGNOSIS — Z78.0 ASYMPTOMATIC MENOPAUSAL STATE: ICD-10-CM

## 2024-10-23 PROCEDURE — 77080 DXA BONE DENSITY AXIAL: CPT

## 2024-10-25 DIAGNOSIS — I10 ESSENTIAL (PRIMARY) HYPERTENSION: ICD-10-CM

## 2024-10-25 RX ORDER — AMLODIPINE BESYLATE 5 MG/1
TABLET ORAL
Qty: 90 TABLET | Refills: 3 | Status: SHIPPED | OUTPATIENT
Start: 2024-10-25

## 2024-12-10 ENCOUNTER — TRANSCRIBE ORDERS (OUTPATIENT)
Facility: HOSPITAL | Age: 79
End: 2024-12-10

## 2024-12-10 DIAGNOSIS — Z12.31 ENCOUNTER FOR SCREENING MAMMOGRAM FOR MALIGNANT NEOPLASM OF BREAST: Primary | ICD-10-CM

## 2025-01-02 ENCOUNTER — HOSPITAL ENCOUNTER (OUTPATIENT)
Facility: HOSPITAL | Age: 80
Discharge: HOME | End: 2025-01-02
Payer: MEDICARE

## 2025-01-02 VITALS — HEIGHT: 65 IN | WEIGHT: 125 LBS | BODY MASS INDEX: 20.83 KG/M2

## 2025-01-02 DIAGNOSIS — Z12.31 ENCOUNTER FOR SCREENING MAMMOGRAM FOR MALIGNANT NEOPLASM OF BREAST: ICD-10-CM

## 2025-01-02 PROCEDURE — 77067 SCR MAMMO BI INCL CAD: CPT

## 2025-04-02 DIAGNOSIS — I25.110 ATHEROSCLEROTIC HEART DISEASE OF NATIVE CORONARY ARTERY WITH UNSTABLE ANGINA PECTORIS (HCC): ICD-10-CM

## 2025-04-02 DIAGNOSIS — E78.2 MIXED HYPERLIPIDEMIA: ICD-10-CM

## 2025-04-03 RX ORDER — SIMVASTATIN 40 MG
40 TABLET ORAL NIGHTLY
Qty: 90 TABLET | Refills: 3 | Status: SHIPPED | OUTPATIENT
Start: 2025-04-03

## 2025-04-03 NOTE — TELEPHONE ENCOUNTER
Per VO by MD.    Future Appointments   Date Time Provider Department Center   4/18/2025  9:00 AM Yi Alva MD [facility]

## 2025-05-09 ENCOUNTER — OFFICE VISIT (OUTPATIENT)
Age: 80
End: 2025-05-09
Payer: MEDICARE

## 2025-05-09 VITALS
SYSTOLIC BLOOD PRESSURE: 140 MMHG | HEART RATE: 58 BPM | HEIGHT: 65 IN | BODY MASS INDEX: 20.99 KG/M2 | WEIGHT: 126 LBS | OXYGEN SATURATION: 92 % | DIASTOLIC BLOOD PRESSURE: 60 MMHG

## 2025-05-09 DIAGNOSIS — I10 ESSENTIAL (PRIMARY) HYPERTENSION: ICD-10-CM

## 2025-05-09 DIAGNOSIS — I25.10 CORONARY ARTERY DISEASE INVOLVING NATIVE CORONARY ARTERY OF NATIVE HEART WITHOUT ANGINA PECTORIS: Primary | ICD-10-CM

## 2025-05-09 DIAGNOSIS — J44.9 CHRONIC OBSTRUCTIVE PULMONARY DISEASE, UNSPECIFIED COPD TYPE (HCC): ICD-10-CM

## 2025-05-09 DIAGNOSIS — E78.2 MIXED HYPERLIPIDEMIA: ICD-10-CM

## 2025-05-09 PROCEDURE — 93005 ELECTROCARDIOGRAM TRACING: CPT | Performed by: SPECIALIST

## 2025-05-09 PROCEDURE — 99214 OFFICE O/P EST MOD 30 MIN: CPT | Performed by: SPECIALIST

## 2025-05-09 NOTE — PROGRESS NOTES
Chief Complaint   Patient presents with    Coronary Artery Disease     Vitals:    05/09/25 1106   BP: (!) 140/60   BP Site: Left Upper Arm   Patient Position: Sitting   Pulse: 58   SpO2: 92%   Weight: 57.2 kg (126 lb)   Height: 1.651 m (5' 5\")     Chest pain: DENIED     Recent hospital stays: DENIED     Refills: DENIED

## 2025-05-09 NOTE — PROGRESS NOTES
Sierra Baca     1945       Yi Alva MD, [facility]  Date of Visit-5/9/2025   PCP is Melani Campos MD   [facility] [place]  [facility]  HPI:  Sierra Baca is a 79 y.o. female   1 year follow-up  CAD/MENDOZA 2007 proximal LAD, MENDOZA to LAD 2014, hypertension, dyslipidemia    Denies chest pain, edema, syncope or shortness of breath at rest   Has no tachycardia , palpitations or sense of arrythmia    Sierra returns today feeling generally about her baseline.    She has mild to moderate shortness of breath and follows with pulmonary.    She is not requiring inhalers at this time.    She stable from that point of view.    [place], feels knee pain limits her due OA    EKG-2024-sinus rhythm at 60 normal axis normal intervals  EKG 5/9/2025 SR at 60 with minor SST findings  Assessment/Plan:     Patient Instructions   Doing well, see us back in one year, get labwork with your PCP      1.  Coronary artery disease involving native coronary artery, native heart without angina pectoris (HCC)  Cath 2006, MENDOZA 2007 to proximal LAD.   Normal stress echo 2019 at 5 minutes.   Stress nuclear 2/18/2022 EF 73% ST depression of 2 mm SVT during exercise.  Normal perfusion.  4 minutes 46 seconds  No current angina.    Continue aspirin and statin     Cardiac Medications       Calcium Channel Blockers       amLODIPine (NORVASC) 5 MG tablet TAKE 1 TABLET EVERY MORNING       HMG CoA Reductase Inhibitors       simvastatin (ZOCOR) 40 MG tablet TAKE 1 TABLET EVERY NIGHT       Salicylates       aspirin 81 MG EC tablet Take by mouth daily           - EKG 12 Lead    2. Mixed hyperlipidemia  LDL  at goal , denies excess muscle aches or new liver issues  simvastatin - 40 MG   Lab Results   Component Value Date    LDL 67 04/12/2024            3. Essential (primary) hypertension  At goal , meds and possible side effects reviewed and patient denies  Hypertension Medications       Calcium Channel

## 2025-05-26 ENCOUNTER — HOSPITAL ENCOUNTER (EMERGENCY)
Facility: HOSPITAL | Age: 80
Discharge: HOME | End: 2025-05-26
Attending: EMERGENCY MEDICINE
Payer: MEDICARE

## 2025-05-26 VITALS
DIASTOLIC BLOOD PRESSURE: 73 MMHG | TEMPERATURE: 98 F | SYSTOLIC BLOOD PRESSURE: 145 MMHG | RESPIRATION RATE: 16 BRPM | HEIGHT: 65 IN | BODY MASS INDEX: 21.67 KG/M2 | HEART RATE: 76 BPM | WEIGHT: 130.07 LBS | OXYGEN SATURATION: 98 %

## 2025-05-26 DIAGNOSIS — N30.01 ACUTE CYSTITIS WITH HEMATURIA: Primary | ICD-10-CM

## 2025-05-26 LAB
ALBUMIN SERPL-MCNC: 3.8 G/DL (ref 3.5–5)
ALBUMIN/GLOB SERPL: 1.4 (ref 1.1–2.2)
ALP SERPL-CCNC: 57 U/L (ref 45–117)
ALT SERPL-CCNC: 18 U/L (ref 12–78)
ANION GAP SERPL CALC-SCNC: 1 MMOL/L (ref 2–12)
APPEARANCE UR: ABNORMAL
AST SERPL-CCNC: 18 U/L (ref 15–37)
BACTERIA URNS QL MICRO: ABNORMAL /HPF
BASOPHILS # BLD: 0.04 K/UL (ref 0–0.1)
BASOPHILS NFR BLD: 0.3 % (ref 0–1)
BILIRUB SERPL-MCNC: 1.5 MG/DL (ref 0.2–1)
BILIRUB UR QL: NEGATIVE
BUN SERPL-MCNC: 14 MG/DL (ref 6–20)
BUN/CREAT SERPL: 19 (ref 12–20)
CALCIUM SERPL-MCNC: 8.9 MG/DL (ref 8.5–10.1)
CHLORIDE SERPL-SCNC: 111 MMOL/L (ref 97–108)
CO2 SERPL-SCNC: 29 MMOL/L (ref 21–32)
COLOR UR: ABNORMAL
CREAT SERPL-MCNC: 0.73 MG/DL (ref 0.55–1.02)
DIFFERENTIAL METHOD BLD: ABNORMAL
EOSINOPHIL # BLD: 0.08 K/UL (ref 0–0.4)
EOSINOPHIL NFR BLD: 0.7 % (ref 0–7)
EPITH CASTS URNS QL MICRO: ABNORMAL /LPF
ERYTHROCYTE [DISTWIDTH] IN BLOOD BY AUTOMATED COUNT: 14.1 % (ref 11.5–14.5)
GLOBULIN SER CALC-MCNC: 2.7 G/DL (ref 2–4)
GLUCOSE SERPL-MCNC: 100 MG/DL (ref 65–100)
GLUCOSE UR STRIP.AUTO-MCNC: NEGATIVE MG/DL
HCT VFR BLD AUTO: 36.3 % (ref 35–47)
HGB BLD-MCNC: 11.7 G/DL (ref 11.5–16)
HGB UR QL STRIP: ABNORMAL
HYALINE CASTS URNS QL MICRO: ABNORMAL /LPF (ref 0–2)
IMM GRANULOCYTES # BLD AUTO: 0.05 K/UL (ref 0–0.04)
IMM GRANULOCYTES NFR BLD AUTO: 0.4 % (ref 0–0.5)
KETONES UR QL STRIP.AUTO: NEGATIVE MG/DL
LEUKOCYTE ESTERASE UR QL STRIP.AUTO: ABNORMAL
LYMPHOCYTES # BLD: 0.88 K/UL (ref 0.8–3.5)
LYMPHOCYTES NFR BLD: 7.4 % (ref 12–49)
MCH RBC QN AUTO: 31.3 PG (ref 26–34)
MCHC RBC AUTO-ENTMCNC: 32.2 G/DL (ref 30–36.5)
MCV RBC AUTO: 97.1 FL (ref 80–99)
MONOCYTES # BLD: 0.75 K/UL (ref 0–1)
MONOCYTES NFR BLD: 6.3 % (ref 5–13)
NEUTS SEG # BLD: 10.04 K/UL (ref 1.8–8)
NEUTS SEG NFR BLD: 84.9 % (ref 32–75)
NITRITE UR QL STRIP.AUTO: POSITIVE
NRBC # BLD: 0 K/UL (ref 0–0.01)
NRBC BLD-RTO: 0 PER 100 WBC
PH UR STRIP: 5 (ref 5–8)
PLATELET # BLD AUTO: 172 K/UL (ref 150–400)
PMV BLD AUTO: 10.9 FL (ref 8.9–12.9)
POTASSIUM SERPL-SCNC: 4.1 MMOL/L (ref 3.5–5.1)
PROT SERPL-MCNC: 6.5 G/DL (ref 6.4–8.2)
PROT UR STRIP-MCNC: 100 MG/DL
RBC # BLD AUTO: 3.74 M/UL (ref 3.8–5.2)
RBC #/AREA URNS HPF: ABNORMAL /HPF (ref 0–5)
SODIUM SERPL-SCNC: 141 MMOL/L (ref 136–145)
SP GR UR REFRACTOMETRY: 1.01 (ref 1–1.03)
SPECIMEN HOLD: NORMAL
UROBILINOGEN UR QL STRIP.AUTO: 1 EU/DL (ref 0.2–1)
WBC # BLD AUTO: 11.8 K/UL (ref 3.6–11)
WBC URNS QL MICRO: >100 /HPF (ref 0–4)

## 2025-05-26 PROCEDURE — 99283 EMERGENCY DEPT VISIT LOW MDM: CPT

## 2025-05-26 PROCEDURE — 81001 URINALYSIS AUTO W/SCOPE: CPT

## 2025-05-26 PROCEDURE — 85025 COMPLETE CBC W/AUTO DIFF WBC: CPT

## 2025-05-26 PROCEDURE — 6370000000 HC RX 637 (ALT 250 FOR IP)

## 2025-05-26 PROCEDURE — 80053 COMPREHEN METABOLIC PANEL: CPT

## 2025-05-26 PROCEDURE — 36415 COLL VENOUS BLD VENIPUNCTURE: CPT

## 2025-05-26 RX ORDER — CEPHALEXIN 500 MG/1
500 CAPSULE ORAL 2 TIMES DAILY
Qty: 14 CAPSULE | Refills: 0 | Status: SHIPPED | OUTPATIENT
Start: 2025-05-26 | End: 2025-06-02

## 2025-05-26 RX ADMIN — CEPHALEXIN 500 MG: 250 CAPSULE ORAL at 14:32

## 2025-05-26 ASSESSMENT — PAIN SCALES - GENERAL: PAINLEVEL_OUTOF10: 0

## 2025-05-26 ASSESSMENT — PAIN - FUNCTIONAL ASSESSMENT: PAIN_FUNCTIONAL_ASSESSMENT: 0-10

## 2025-05-26 NOTE — DISCHARGE INSTRUCTIONS
We discussed your results today. It is important for you to follow up with your primary care for further evaluation and management. You were prescribed antibiotics which you should take to completion. Return to the emergency department for worsening symptoms.

## 2025-05-26 NOTE — ED PROVIDER NOTES
present.      Mental Status: She is alert and oriented to person, place, and time.      Sensory: No sensory deficit.      Gait: Gait normal.   Psychiatric:         Mood and Affect: Mood normal.         Behavior: Behavior normal.         DIAGNOSTIC RESULTS     EKG: All EKG's are interpreted by the Emergency Department Physician who either signs or Co-signs this chart in the absence of a cardiologist.        RADIOLOGY:   Non-plain film images such as CT, Ultrasound and MRI are read by the radiologist. Plain radiographic images are visualized and preliminarily interpreted by the emergency physician with the below findings:        Interpretation per the Radiologist below, if available at the time of this note:    No orders to display        LABS:  Labs Reviewed   URINALYSIS WITH MICROSCOPIC - Abnormal; Notable for the following components:       Result Value    Appearance CLOUDY (*)     Protein,  (*)     Blood, Urine MODERATE (*)     Nitrite, Urine Positive (*)     Leukocyte Esterase, Urine LARGE (*)     WBC, UA >100 (*)     BACTERIA, URINE 2+ (*)     All other components within normal limits   CBC WITH AUTO DIFFERENTIAL - Abnormal; Notable for the following components:    WBC 11.8 (*)     RBC 3.74 (*)     Neutrophils % 84.9 (*)     Lymphocytes % 7.4 (*)     Neutrophils Absolute 10.04 (*)     Immature Granulocytes Absolute 0.05 (*)     All other components within normal limits   COMPREHENSIVE METABOLIC PANEL - Abnormal; Notable for the following components:    Chloride 111 (*)     Anion Gap 1 (*)     Total Bilirubin 1.5 (*)     All other components within normal limits   URINE CULTURE HOLD SAMPLE       All other labs were within normal range or not returned as of this dictation.    EMERGENCY DEPARTMENT COURSE and DIFFERENTIAL DIAGNOSIS/MDM:   Vitals:    Vitals:    05/26/25 1245   BP: (!) 153/53   Pulse: 72   Resp: 18   Temp: 98.4 °F (36.9 °C)   TempSrc: Oral   SpO2: 98%   Weight: 59 kg (130 lb 1.1 oz)   Height:

## 2025-05-26 NOTE — ED TRIAGE NOTES
Pt arrives to ED via POV with c/o urinary frequency and pain with urination starting yesterday.    Endorses lower abd pain. Denies blood in urine    NORMA Worley in triage   Registration calls back to ED charge nurse stating pt was found laying on ER lobby floor, charge nurse again attempts to defuse situation, pt heard moaning in ER lobby at this time.

## 2025-06-17 ENCOUNTER — HOSPITAL ENCOUNTER (INPATIENT)
Facility: HOSPITAL | Age: 80
LOS: 1 days | Discharge: ANOTHER HOSPITAL | DRG: 281 | End: 2025-06-18
Attending: EMERGENCY MEDICINE | Admitting: HOSPITALIST
Payer: MEDICARE

## 2025-06-17 ENCOUNTER — APPOINTMENT (OUTPATIENT)
Facility: HOSPITAL | Age: 80
DRG: 281 | End: 2025-06-17
Payer: MEDICARE

## 2025-06-17 DIAGNOSIS — R07.9 CHEST PAIN, UNSPECIFIED TYPE: ICD-10-CM

## 2025-06-17 DIAGNOSIS — I21.4 NSTEMI (NON-ST ELEVATED MYOCARDIAL INFARCTION) (HCC): ICD-10-CM

## 2025-06-17 DIAGNOSIS — I20.0 UNSTABLE ANGINA (HCC): Primary | ICD-10-CM

## 2025-06-17 DIAGNOSIS — R79.89 ELEVATED TROPONIN: ICD-10-CM

## 2025-06-17 DIAGNOSIS — R06.02 SHORTNESS OF BREATH: ICD-10-CM

## 2025-06-17 PROBLEM — I24.9 ACS (ACUTE CORONARY SYNDROME) (HCC): Status: ACTIVE | Noted: 2025-06-17

## 2025-06-17 LAB
ALBUMIN SERPL-MCNC: 3.2 G/DL (ref 3.5–5)
ALBUMIN/GLOB SERPL: 1.1 (ref 1.1–2.2)
ALP SERPL-CCNC: 45 U/L (ref 45–117)
ALT SERPL-CCNC: 16 U/L (ref 12–78)
ANION GAP SERPL CALC-SCNC: 0 MMOL/L (ref 2–12)
APTT PPP: 26.8 SEC (ref 22.1–31)
AST SERPL-CCNC: 16 U/L (ref 15–37)
BASOPHILS # BLD: 0.05 K/UL (ref 0–0.1)
BASOPHILS NFR BLD: 0.7 % (ref 0–1)
BILIRUB SERPL-MCNC: 0.4 MG/DL (ref 0.2–1)
BUN SERPL-MCNC: 12 MG/DL (ref 6–20)
BUN/CREAT SERPL: 16 (ref 12–20)
CALCIUM SERPL-MCNC: 9.2 MG/DL (ref 8.5–10.1)
CHLORIDE SERPL-SCNC: 110 MMOL/L (ref 97–108)
CO2 SERPL-SCNC: 31 MMOL/L (ref 21–32)
CREAT SERPL-MCNC: 0.77 MG/DL (ref 0.55–1.02)
DIFFERENTIAL METHOD BLD: ABNORMAL
ECHO AO ARCH DIAM: 1.7 CM
ECHO AO ASC DIAM: 2.8 CM
ECHO AO ASCENDING AORTA INDEX: 1.7 CM/M2
ECHO AO ROOT DIAM: 3.1 CM
ECHO AO ROOT INDEX: 1.88 CM/M2
ECHO AV AREA PEAK VELOCITY: 1.7 CM2
ECHO AV AREA VTI: 1.7 CM2
ECHO AV AREA/BSA PEAK VELOCITY: 1 CM2/M2
ECHO AV AREA/BSA VTI: 1 CM2/M2
ECHO AV MEAN GRADIENT: 4 MMHG
ECHO AV MEAN VELOCITY: 0.9 M/S
ECHO AV PEAK GRADIENT: 9 MMHG
ECHO AV PEAK VELOCITY: 1.5 M/S
ECHO AV VELOCITY RATIO: 0.8
ECHO AV VTI: 26.9 CM
ECHO BSA: 1.64 M2
ECHO EST RA PRESSURE: 3 MMHG
ECHO LA DIAMETER INDEX: 2.36 CM/M2
ECHO LA DIAMETER: 3.9 CM
ECHO LA TO AORTIC ROOT RATIO: 1.26
ECHO LA VOL A-L A2C: 60 ML (ref 22–52)
ECHO LA VOL A-L A4C: 64 ML (ref 22–52)
ECHO LA VOL BP: 60 ML (ref 22–52)
ECHO LA VOL MOD A2C: 58 ML (ref 22–52)
ECHO LA VOL MOD A4C: 56 ML (ref 22–52)
ECHO LA VOL/BSA BIPLANE: 36 ML/M2 (ref 16–34)
ECHO LA VOLUME AREA LENGTH: 65 ML
ECHO LA VOLUME INDEX A-L A2C: 36 ML/M2 (ref 16–34)
ECHO LA VOLUME INDEX A-L A4C: 39 ML/M2 (ref 16–34)
ECHO LA VOLUME INDEX AREA LENGTH: 39 ML/M2 (ref 16–34)
ECHO LA VOLUME INDEX MOD A2C: 35 ML/M2 (ref 16–34)
ECHO LA VOLUME INDEX MOD A4C: 34 ML/M2 (ref 16–34)
ECHO LV E' LATERAL VELOCITY: 14.06 CM/S
ECHO LV E' SEPTAL VELOCITY: 5.38 CM/S
ECHO LV EDV A2C: 61 ML
ECHO LV EDV A4C: 97 ML
ECHO LV EDV BP: 76 ML (ref 56–104)
ECHO LV EDV INDEX A4C: 59 ML/M2
ECHO LV EDV INDEX BP: 46 ML/M2
ECHO LV EDV NDEX A2C: 37 ML/M2
ECHO LV EF PHYSICIAN: 50 %
ECHO LV EJECTION FRACTION A2C: 39 %
ECHO LV EJECTION FRACTION A4C: 68 %
ECHO LV EJECTION FRACTION BIPLANE: 55 % (ref 55–100)
ECHO LV ESV A2C: 37 ML
ECHO LV ESV A4C: 31 ML
ECHO LV ESV BP: 35 ML (ref 19–49)
ECHO LV ESV INDEX A2C: 22 ML/M2
ECHO LV ESV INDEX A4C: 19 ML/M2
ECHO LV ESV INDEX BP: 21 ML/M2
ECHO LV FRACTIONAL SHORTENING: 26 % (ref 28–44)
ECHO LV INTERNAL DIMENSION DIASTOLE INDEX: 3.03 CM/M2
ECHO LV INTERNAL DIMENSION DIASTOLIC: 5 CM (ref 3.9–5.3)
ECHO LV INTERNAL DIMENSION SYSTOLIC INDEX: 2.24 CM/M2
ECHO LV INTERNAL DIMENSION SYSTOLIC: 3.7 CM
ECHO LV IVSD: 0.9 CM (ref 0.6–0.9)
ECHO LV MASS 2D: 182 G (ref 67–162)
ECHO LV MASS INDEX 2D: 110.3 G/M2 (ref 43–95)
ECHO LV POSTERIOR WALL DIASTOLIC: 1.1 CM (ref 0.6–0.9)
ECHO LV RELATIVE WALL THICKNESS RATIO: 0.44
ECHO LVOT AREA: 2 CM2
ECHO LVOT AV VTI INDEX: 0.83
ECHO LVOT DIAM: 1.6 CM
ECHO LVOT MEAN GRADIENT: 3 MMHG
ECHO LVOT PEAK GRADIENT: 6 MMHG
ECHO LVOT PEAK VELOCITY: 1.2 M/S
ECHO LVOT STROKE VOLUME INDEX: 27.2 ML/M2
ECHO LVOT SV: 44.8 ML
ECHO LVOT VTI: 22.3 CM
ECHO MV A VELOCITY: 0.21 M/S
ECHO MV AREA VTI: 1.4 CM2
ECHO MV E DECELERATION TIME (DT): 175.1 MS
ECHO MV E VELOCITY: 1.43 M/S
ECHO MV E/A RATIO: 6.81
ECHO MV E/E' LATERAL: 10.17
ECHO MV E/E' RATIO (AVERAGED): 18.38
ECHO MV E/E' SEPTAL: 26.58
ECHO MV EROA PISA: 0.2 CM2
ECHO MV LVOT VTI INDEX: 1.44
ECHO MV MAX VELOCITY: 2 M/S
ECHO MV MEAN GRADIENT: 5 MMHG
ECHO MV MEAN VELOCITY: 1 M/S
ECHO MV PEAK GRADIENT: 16 MMHG
ECHO MV REGURGITANT ALIASING (NYQUIST) VELOCITY: 26 CM/S
ECHO MV REGURGITANT PEAK GRADIENT: 149 MMHG
ECHO MV REGURGITANT PEAK VELOCITY: 6.1 M/S
ECHO MV REGURGITANT RADIUS PISA: 0.77 CM
ECHO MV REGURGITANT VELOCITY PISA: 5.8 M/S
ECHO MV REGURGITANT VOLUME PISA: 26.69 ML
ECHO MV REGURGITANT VTIA: 159.9 CM
ECHO MV VTI: 32.1 CM
ECHO PV MAX VELOCITY: 0.8 M/S
ECHO PV PEAK GRADIENT: 3 MMHG
ECHO RIGHT VENTRICULAR SYSTOLIC PRESSURE (RVSP): 55 MMHG
ECHO RV FREE WALL PEAK S': 16.3 CM/S
ECHO RV INTERNAL DIMENSION: 3.3 CM
ECHO RV TAPSE: 2.6 CM (ref 1.7–?)
ECHO TV REGURGITANT MAX VELOCITY: 3.6 M/S
ECHO TV REGURGITANT PEAK GRADIENT: 52 MMHG
EKG ATRIAL RATE: 68 BPM
EKG DIAGNOSIS: NORMAL
EKG P AXIS: 48 DEGREES
EKG P-R INTERVAL: 140 MS
EKG Q-T INTERVAL: 418 MS
EKG QRS DURATION: 88 MS
EKG QTC CALCULATION (BAZETT): 444 MS
EKG R AXIS: 16 DEGREES
EKG T AXIS: 17 DEGREES
EKG VENTRICULAR RATE: 68 BPM
EOSINOPHIL # BLD: 0.08 K/UL (ref 0–0.4)
EOSINOPHIL NFR BLD: 1 % (ref 0–7)
ERYTHROCYTE [DISTWIDTH] IN BLOOD BY AUTOMATED COUNT: 14.6 % (ref 11.5–14.5)
GLOBULIN SER CALC-MCNC: 2.9 G/DL (ref 2–4)
GLUCOSE SERPL-MCNC: 85 MG/DL (ref 65–100)
HCT VFR BLD AUTO: 35.4 % (ref 35–47)
HGB BLD-MCNC: 11.2 G/DL (ref 11.5–16)
IMM GRANULOCYTES # BLD AUTO: 0.03 K/UL (ref 0–0.04)
IMM GRANULOCYTES NFR BLD AUTO: 0.4 % (ref 0–0.5)
INR PPP: 1.1 (ref 0.9–1.1)
LYMPHOCYTES # BLD: 0.95 K/UL (ref 0.8–3.5)
LYMPHOCYTES NFR BLD: 12.4 % (ref 12–49)
MCH RBC QN AUTO: 30.9 PG (ref 26–34)
MCHC RBC AUTO-ENTMCNC: 31.6 G/DL (ref 30–36.5)
MCV RBC AUTO: 97.5 FL (ref 80–99)
MONOCYTES # BLD: 0.59 K/UL (ref 0–1)
MONOCYTES NFR BLD: 7.7 % (ref 5–13)
NEUTS SEG # BLD: 5.97 K/UL (ref 1.8–8)
NEUTS SEG NFR BLD: 77.8 % (ref 32–75)
NRBC # BLD: 0 K/UL (ref 0–0.01)
NRBC BLD-RTO: 0 PER 100 WBC
NT PRO BNP: 4970 PG/ML
PLATELET # BLD AUTO: 222 K/UL (ref 150–400)
PMV BLD AUTO: 10.6 FL (ref 8.9–12.9)
POTASSIUM SERPL-SCNC: 4.2 MMOL/L (ref 3.5–5.1)
PROT SERPL-MCNC: 6.1 G/DL (ref 6.4–8.2)
PROTHROMBIN TIME: 11.1 SEC (ref 9.2–11.2)
RBC # BLD AUTO: 3.63 M/UL (ref 3.8–5.2)
SODIUM SERPL-SCNC: 141 MMOL/L (ref 136–145)
THERAPEUTIC RANGE: NORMAL SECS (ref 58–77)
TROPONIN I SERPL HS-MCNC: 563 NG/L (ref 0–51)
TROPONIN I SERPL HS-MCNC: 575 NG/L (ref 0–51)
UFH PPP CHRO-ACNC: 0.28 IU/ML
UFH PPP CHRO-ACNC: <0.1 IU/ML
WBC # BLD AUTO: 7.7 K/UL (ref 3.6–11)

## 2025-06-17 PROCEDURE — 99285 EMERGENCY DEPT VISIT HI MDM: CPT

## 2025-06-17 PROCEDURE — 71045 X-RAY EXAM CHEST 1 VIEW: CPT

## 2025-06-17 PROCEDURE — 99222 1ST HOSP IP/OBS MODERATE 55: CPT | Performed by: INTERNAL MEDICINE

## 2025-06-17 PROCEDURE — 80053 COMPREHEN METABOLIC PANEL: CPT

## 2025-06-17 PROCEDURE — 93005 ELECTROCARDIOGRAM TRACING: CPT | Performed by: EMERGENCY MEDICINE

## 2025-06-17 PROCEDURE — 93306 TTE W/DOPPLER COMPLETE: CPT | Performed by: INTERNAL MEDICINE

## 2025-06-17 PROCEDURE — 36415 COLL VENOUS BLD VENIPUNCTURE: CPT

## 2025-06-17 PROCEDURE — 84484 ASSAY OF TROPONIN QUANT: CPT

## 2025-06-17 PROCEDURE — 93306 TTE W/DOPPLER COMPLETE: CPT

## 2025-06-17 PROCEDURE — 6360000002 HC RX W HCPCS: Performed by: NURSE PRACTITIONER

## 2025-06-17 PROCEDURE — 93010 ELECTROCARDIOGRAM REPORT: CPT | Performed by: INTERNAL MEDICINE

## 2025-06-17 PROCEDURE — 6370000000 HC RX 637 (ALT 250 FOR IP): Performed by: HOSPITALIST

## 2025-06-17 PROCEDURE — APPSS45 APP SPLIT SHARED TIME 31-45 MINUTES: Performed by: NURSE PRACTITIONER

## 2025-06-17 PROCEDURE — 93005 ELECTROCARDIOGRAM TRACING: CPT | Performed by: STUDENT IN AN ORGANIZED HEALTH CARE EDUCATION/TRAINING PROGRAM

## 2025-06-17 PROCEDURE — 85730 THROMBOPLASTIN TIME PARTIAL: CPT

## 2025-06-17 PROCEDURE — 2500000003 HC RX 250 WO HCPCS: Performed by: HOSPITALIST

## 2025-06-17 PROCEDURE — 83880 ASSAY OF NATRIURETIC PEPTIDE: CPT

## 2025-06-17 PROCEDURE — 85520 HEPARIN ASSAY: CPT

## 2025-06-17 PROCEDURE — 85610 PROTHROMBIN TIME: CPT

## 2025-06-17 PROCEDURE — 85025 COMPLETE CBC W/AUTO DIFF WBC: CPT

## 2025-06-17 PROCEDURE — 2060000000 HC ICU INTERMEDIATE R&B

## 2025-06-17 RX ORDER — HEPARIN SODIUM 1000 [USP'U]/ML
60 INJECTION, SOLUTION INTRAVENOUS; SUBCUTANEOUS PRN
Status: DISCONTINUED | OUTPATIENT
Start: 2025-06-17 | End: 2025-06-18 | Stop reason: HOSPADM

## 2025-06-17 RX ORDER — CITALOPRAM HYDROBROMIDE 20 MG/1
20 TABLET ORAL DAILY
Status: DISCONTINUED | OUTPATIENT
Start: 2025-06-18 | End: 2025-06-18 | Stop reason: HOSPADM

## 2025-06-17 RX ORDER — TIMOLOL MALEATE 5 MG/ML
1 SOLUTION/ DROPS OPHTHALMIC 2 TIMES DAILY
Status: DISCONTINUED | OUTPATIENT
Start: 2025-06-17 | End: 2025-06-18 | Stop reason: HOSPADM

## 2025-06-17 RX ORDER — HEPARIN SODIUM 1000 [USP'U]/ML
30 INJECTION, SOLUTION INTRAVENOUS; SUBCUTANEOUS PRN
Status: DISCONTINUED | OUTPATIENT
Start: 2025-06-17 | End: 2025-06-18 | Stop reason: HOSPADM

## 2025-06-17 RX ORDER — ACETAMINOPHEN 650 MG/1
650 SUPPOSITORY RECTAL EVERY 6 HOURS PRN
Status: DISCONTINUED | OUTPATIENT
Start: 2025-06-17 | End: 2025-06-18 | Stop reason: HOSPADM

## 2025-06-17 RX ORDER — ONDANSETRON 4 MG/1
4 TABLET, ORALLY DISINTEGRATING ORAL EVERY 8 HOURS PRN
Status: DISCONTINUED | OUTPATIENT
Start: 2025-06-17 | End: 2025-06-18 | Stop reason: HOSPADM

## 2025-06-17 RX ORDER — SODIUM CHLORIDE 0.9 % (FLUSH) 0.9 %
5-40 SYRINGE (ML) INJECTION EVERY 12 HOURS SCHEDULED
Status: DISCONTINUED | OUTPATIENT
Start: 2025-06-17 | End: 2025-06-18 | Stop reason: HOSPADM

## 2025-06-17 RX ORDER — LATANOPROST 50 UG/ML
1 SOLUTION/ DROPS OPHTHALMIC NIGHTLY
Status: DISCONTINUED | OUTPATIENT
Start: 2025-06-17 | End: 2025-06-18 | Stop reason: HOSPADM

## 2025-06-17 RX ORDER — MAGNESIUM SULFATE IN WATER 40 MG/ML
2000 INJECTION, SOLUTION INTRAVENOUS PRN
Status: DISCONTINUED | OUTPATIENT
Start: 2025-06-17 | End: 2025-06-18 | Stop reason: HOSPADM

## 2025-06-17 RX ORDER — HEPARIN SODIUM 10000 [USP'U]/100ML
5-30 INJECTION, SOLUTION INTRAVENOUS CONTINUOUS
Status: DISCONTINUED | OUTPATIENT
Start: 2025-06-17 | End: 2025-06-18 | Stop reason: HOSPADM

## 2025-06-17 RX ORDER — ACETAMINOPHEN 325 MG/1
650 TABLET ORAL EVERY 6 HOURS PRN
Status: DISCONTINUED | OUTPATIENT
Start: 2025-06-17 | End: 2025-06-18 | Stop reason: HOSPADM

## 2025-06-17 RX ORDER — SODIUM CHLORIDE 0.9 % (FLUSH) 0.9 %
5-40 SYRINGE (ML) INJECTION PRN
Status: DISCONTINUED | OUTPATIENT
Start: 2025-06-17 | End: 2025-06-18 | Stop reason: HOSPADM

## 2025-06-17 RX ORDER — BUMETANIDE 0.25 MG/ML
0.5 INJECTION, SOLUTION INTRAMUSCULAR; INTRAVENOUS ONCE
Status: COMPLETED | OUTPATIENT
Start: 2025-06-17 | End: 2025-06-17

## 2025-06-17 RX ORDER — BRIMONIDINE TARTRATE 2 MG/ML
1 SOLUTION/ DROPS OPHTHALMIC 2 TIMES DAILY
Status: DISCONTINUED | OUTPATIENT
Start: 2025-06-17 | End: 2025-06-18 | Stop reason: HOSPADM

## 2025-06-17 RX ORDER — POTASSIUM CHLORIDE 750 MG/1
40 TABLET, EXTENDED RELEASE ORAL PRN
Status: DISCONTINUED | OUTPATIENT
Start: 2025-06-17 | End: 2025-06-18 | Stop reason: HOSPADM

## 2025-06-17 RX ORDER — ATORVASTATIN CALCIUM 20 MG/1
20 TABLET, FILM COATED ORAL DAILY
Status: DISCONTINUED | OUTPATIENT
Start: 2025-06-18 | End: 2025-06-18 | Stop reason: HOSPADM

## 2025-06-17 RX ORDER — PANTOPRAZOLE SODIUM 40 MG/1
40 TABLET, DELAYED RELEASE ORAL
Status: DISCONTINUED | OUTPATIENT
Start: 2025-06-18 | End: 2025-06-18 | Stop reason: HOSPADM

## 2025-06-17 RX ORDER — POLYETHYLENE GLYCOL 3350 17 G/17G
17 POWDER, FOR SOLUTION ORAL DAILY PRN
Status: DISCONTINUED | OUTPATIENT
Start: 2025-06-17 | End: 2025-06-18 | Stop reason: HOSPADM

## 2025-06-17 RX ORDER — AMLODIPINE BESYLATE 5 MG/1
5 TABLET ORAL EVERY MORNING
Status: DISCONTINUED | OUTPATIENT
Start: 2025-06-18 | End: 2025-06-18 | Stop reason: HOSPADM

## 2025-06-17 RX ORDER — SODIUM CHLORIDE 9 MG/ML
INJECTION, SOLUTION INTRAVENOUS PRN
Status: DISCONTINUED | OUTPATIENT
Start: 2025-06-17 | End: 2025-06-18 | Stop reason: HOSPADM

## 2025-06-17 RX ORDER — ONDANSETRON 2 MG/ML
4 INJECTION INTRAMUSCULAR; INTRAVENOUS EVERY 6 HOURS PRN
Status: DISCONTINUED | OUTPATIENT
Start: 2025-06-17 | End: 2025-06-18 | Stop reason: HOSPADM

## 2025-06-17 RX ORDER — ASPIRIN 81 MG/1
81 TABLET ORAL DAILY
Status: DISCONTINUED | OUTPATIENT
Start: 2025-06-17 | End: 2025-06-18 | Stop reason: HOSPADM

## 2025-06-17 RX ORDER — POTASSIUM CHLORIDE 7.45 MG/ML
10 INJECTION INTRAVENOUS PRN
Status: DISCONTINUED | OUTPATIENT
Start: 2025-06-17 | End: 2025-06-18 | Stop reason: HOSPADM

## 2025-06-17 RX ORDER — HEPARIN SODIUM 1000 [USP'U]/ML
60 INJECTION, SOLUTION INTRAVENOUS; SUBCUTANEOUS ONCE
Status: COMPLETED | OUTPATIENT
Start: 2025-06-17 | End: 2025-06-17

## 2025-06-17 RX ADMIN — HEPARIN SODIUM 12 UNITS/KG/HR: 10000 INJECTION, SOLUTION INTRAVENOUS at 13:39

## 2025-06-17 RX ADMIN — LATANOPROST 1 DROP: 50 SOLUTION OPHTHALMIC at 21:29

## 2025-06-17 RX ADMIN — TIMOLOL MALEATE 1 DROP: 5 SOLUTION OPHTHALMIC at 21:42

## 2025-06-17 RX ADMIN — BUMETANIDE 0.5 MG: 0.25 INJECTION INTRAMUSCULAR; INTRAVENOUS at 13:35

## 2025-06-17 RX ADMIN — SODIUM CHLORIDE, PRESERVATIVE FREE 10 ML: 5 INJECTION INTRAVENOUS at 21:38

## 2025-06-17 RX ADMIN — BRIMONIDINE TARTRATE 1 DROP: 2 SOLUTION/ DROPS OPHTHALMIC at 21:37

## 2025-06-17 RX ADMIN — HEPARIN SODIUM 1800 UNITS: 1000 INJECTION INTRAVENOUS; SUBCUTANEOUS at 21:16

## 2025-06-17 RX ADMIN — HEPARIN SODIUM 3500 UNITS: 1000 INJECTION INTRAVENOUS; SUBCUTANEOUS at 13:36

## 2025-06-17 ASSESSMENT — LIFESTYLE VARIABLES
HOW OFTEN DO YOU HAVE A DRINK CONTAINING ALCOHOL: NEVER
HOW MANY STANDARD DRINKS CONTAINING ALCOHOL DO YOU HAVE ON A TYPICAL DAY: PATIENT DOES NOT DRINK

## 2025-06-17 ASSESSMENT — PAIN DESCRIPTION - DESCRIPTORS: DESCRIPTORS: ACHING

## 2025-06-17 ASSESSMENT — PAIN DESCRIPTION - LOCATION: LOCATION: CHEST

## 2025-06-17 ASSESSMENT — PAIN SCALES - GENERAL: PAINLEVEL_OUTOF10: 5

## 2025-06-17 ASSESSMENT — PAIN - FUNCTIONAL ASSESSMENT: PAIN_FUNCTIONAL_ASSESSMENT: 0-10

## 2025-06-17 NOTE — H&P
Hospitalist Admission Note      NAME:  Sierra Baca   :  1945   MRN:  [id number]     Date/Time:  2025 1:56 PM    Patient PCP: Melani Campos MD    ________________________________________________________________________    Given the patient's current clinical presentation, I have a high level of concern for decompensation if discharged from the emergency department.  Complex decision making was performed, which includes reviewing the patient's available past medical records, laboratory results, and x-ray films.       My assessment of this patient's clinical condition and my plan of care is as follows.    Assessment / Plan:  patient is a 79-year-old female with a history of CAD, hypertension, hyperlipidemia, acid reflux comes to the hospital with non-ST elevation MI. Patient is admitted for heparin drip, serial troponin, cardiology evaluation and left heart cath.    NSTEMI  patient has a history of CAD and had two stents placed in the past.  Initial troponin are elevated.  Start heparin GTT and trend the troponins.  Cardiology consult  repeat echocardiogram. Last echo in  showed EF 63%  NPO after midnight for left heart cath tomorrow.  Continue aspirin and statins.    2. COPD  albuterol PRN.    3. Hypertension  patient is taking Norvasc 5 mg daily at home.    4. Hyperlipidemia  patient is taking simvastatin 40 mg daily at home.    5. Glaucoma  Continue Brimonidine, dorzolamide-timolol, latanoprost    6. Acid reflux  continue Protonix 40 mg daily.    7. Anxiety and depression  continue          I have personally reviewed the radiographs, laboratory data in Epic and decisions and statements above are based partially on this personal interpretation.    Code Status: Full Code  DVT Prophylaxis: Hep SQ  GI Prophylaxis: PPI    Plan of care discussed with the patient at bedside.    I personally spent 35 minutes of critical care time with the patient. Patient is at high risk of    _______________________________________________________________________  Recommended Disposition:   Home with Family x   HH/PT/OT/RN    SNF/LTC    JIMENEZ    ________________________________________________________________________  TOTAL TIME:  75 Minutes        Comments   >50% of visit spent in counseling and coordination of care  Chart reviewed  Discussion with patient and/or family and questions answered     ________________________________________________________________________  Signed: Lexy Belle MD        Procedures: see electronic medical records for all procedures/Xrays/labs and details which were not copied into this note but were reviewed prior to creation of Plan.

## 2025-06-17 NOTE — ED TRIAGE NOTES
Pt.  was admitted on 5/26 for uti and was sent home with abx,  finished her abx about 1 week ago,  has had SOB with chest pressure and pain in between her shoulder blades.

## 2025-06-17 NOTE — ED PROVIDER NOTES
[facility] EMERGENCY DEPARTMENT  EMERGENCY DEPARTMENT ENCOUNTER      Pt Name: Sierra Baca  MRN: [id number]  Birthdate 1945  Date of evaluation: 6/17/2025  Provider: Jesse Jackson MD      HISTORY OF PRESENT ILLNESS      79-year-old female with history of COPD, coronary disease with previous stents, hypertension presents to the emergency department with a chief complaint of shortness of breath and chest pressure.  Somewhat worse with walking.  This has been going on for 5 or 6 days.  There is a recent UTI and she completed treatment for that.  She still feels that she has urinary frequency and urgency.    The history is provided by the patient and medical records.           Nursing Notes were reviewed.    REVIEW OF SYSTEMS         Review of Systems        PAST MEDICAL HISTORY     Past Medical History:   Diagnosis Date    Anxiety     Arthritis     CAD (coronary artery disease)     COPD (chronic obstructive pulmonary disease) ([facility]) 1/7/2016    Coronary atherosclerosis of native coronary artery 2006    MI/Left ventricular wall motion is segmentally abnormal with mild hypokinesis of the anterior wall. Ejection Fraction is estimated at 50%. LAD >90% stenosis. LCX 50% stenosis.     Depression     Elevated BP 2/1/2017    Family history of suicide     father    Fatigue     GERD (gastroesophageal reflux disease)     Glaucoma     Headache 1988    migraines    Hypertension, essential 11/1/2017    Incontinence in female     Lung nodule     Menopause     Mixed hyperlipidemia     Osteopenia 2015    DEXA 2010, 2015    S/P angioplasty with stent     stent to LAD. 3X8mm Cypher.          SURGICAL HISTORY       Past Surgical History:   Procedure Laterality Date    CARDIAC CATHETERIZATION  2006    Left ventricular wall motion is segmentally abnormal with mild hypokinesis of the anterior wall. Ejection Fraction is estimated at 50%. LAD >90% stenosis. LCX 50% stenosis.     CARDIAC CATHETERIZATION  2014     necessary to treat or prevent imminent or life-threatening deterioration of the following conditions: Acute coronary syndrome    Critical care was time spent personally by me on the following activities:  Evaluation of patient's response to treatment, examination of patient, ordering and performing treatments and interventions, ordering and review of laboratory studies and re-evaluation of patient's condition  )            REASSESSMENT     ED Course as of 06/17/25 1214   Tue Jun 17, 2025   1121 I have independently viewed the obtained radiographic images and note chest x-ray without significant change. Will await radiology read. [JM]   1212 ED EKG interpretation:  Rhythm: sinus rhythm. Rate (approx.): 68.  Axis: normal.  ST segment: Nonspecific ST segments with some underlying artifact.  No STEMI. This EKG was interpreted by Jesse Jackson MD,ED Physician. [JM]      ED Course User Index  [JM] Jesse Jackson MD         CONSULTS:  None    PROCEDURES:     Procedures      Perfect Serve Consult for Admission  12:16 PM    ED Room Number: ER06/06  Patient Name and age:  Sierra Baca 79 y.o.  female  Working Diagnosis:   1. Unstable angina (HCC)    2. Elevated troponin        COVID-19 Suspicion: No  Sepsis present:  No    Code Status:  Full Code  Readmission: No  Isolation Requirements: no  Recommended Level of Care: telemetry  Department: [facility] - [phone]  Consulting Provider: Dr Belle    Other:  cardiology c/s ordered           (Please note that portions of this note were completed with a voice recognition program.  Efforts were made to edit the dictations but occasionally words are mis-transcribed.)    Jesse Jackson MD (electronically signed)  Emergency Attending Physician              Jesse Jackson MD  06/17/25 9928

## 2025-06-17 NOTE — CONSULTS
[facility]                    Cardiology Care Note     [x]Initial Encounter     []Follow-up    Patient Name: Sierra Baca - :1945 - MRN:[id number]  Primary Cardiologist: Yi Alva MD  Consulting Cardiologist: Mingo Fisher MD     Reason for encounter: elevated troponin    HPI:       Sierra Baca is a 79 y.o. female with history of COPD, coronary disease with PCI on , hypertension presents to the emergency department with a chief complaint of shortness of breath and chest pressure. Somewhat worse with walking. This has been going on for 5 or 6 days.     She had a recent UTI and she completed treatment for that. She still feels that she has urinary frequency and urgency.     Subjective:      Sierra Baca reports chest pressure/discomfort and dyspnea.     Assessment and Plan     1 ACS/NSTEMI  - trend troponins till peak  - ACS heparin  - LHC in am , d/w pt she agrees to proceed   - ECHO  -lipids pending   - check HBA1C  - -pBNP ~ 4000  - Bumex 0.5 mg IV now  - NPO after midnight    2 HX CAD:   Cath , MENDOZA  to proximal LAD.   Normal stress echo 2019   Stress nuclear 2022 EF 73% ST depression of 2 mm SVT during exercise.    Continue aspirin and statin     3 HTN  - resume home norvasc     4 HLD    5 recent Uti .  - still with frequency  - check UA           ____________________________________________________________    Cardiac testing  22    TRANSTHORACIC ECHOCARDIOGRAM (TTE) COMPLETE (CONTRAST/BUBBLE/3D PRN) 2022  1:00 PM, 2022 12:00 AM (Final)    Narrative  This is a summary report. The complete report is available in the patient's medical record. If you cannot access the medical record, please contact the sending organization for a detailed fax or copy.      Left Ventricle: Left ventricle size is normal. Normal wall thickness. Normal wall motion. Normal left ventricular systolic function. EF by 2D Simpsons Biplane is 63%. Normal diastolic  Take 1 tablet by mouth 2 times daily, Disp: , Rfl:     citalopram (CELEXA) 20 MG tablet, Take by mouth daily, Disp: , Rfl:     latanoprost (XALATAN) 0.005 % ophthalmic solution, Apply 1 drop to eye, Disp: , Rfl:     pantoprazole (PROTONIX) 40 MG tablet, Take by mouth daily (Patient not taking: Reported on 5/9/2025), Disp: , Rfl:     timolol (TIMOPTIC) 0.5 % ophthalmic solution, ceived the following from [facility]: Outside name: timolol (TIMOPTIC) 0.5 % ophthalmic solution, Disp: , Rfl:     WALE Cardenas NP    [facility]  Call center: P) [phone]  (F) [phone]      CC:Melani Campos MD

## 2025-06-18 ENCOUNTER — HOSPITAL ENCOUNTER (INPATIENT)
Facility: HOSPITAL | Age: 80
LOS: 3 days | Discharge: HOME | DRG: 321 | End: 2025-06-21
Attending: HOSPITALIST | Admitting: HOSPITALIST
Payer: MEDICARE

## 2025-06-18 VITALS
BODY MASS INDEX: 21.66 KG/M2 | SYSTOLIC BLOOD PRESSURE: 138 MMHG | OXYGEN SATURATION: 94 % | RESPIRATION RATE: 16 BRPM | WEIGHT: 130 LBS | HEIGHT: 65 IN | TEMPERATURE: 98.6 F | HEART RATE: 71 BPM | DIASTOLIC BLOOD PRESSURE: 55 MMHG

## 2025-06-18 DIAGNOSIS — I25.10 CAD (CORONARY ARTERY DISEASE): ICD-10-CM

## 2025-06-18 DIAGNOSIS — I21.4 NSTEMI (NON-ST ELEVATED MYOCARDIAL INFARCTION) (HCC): Primary | ICD-10-CM

## 2025-06-18 LAB
ANION GAP SERPL CALC-SCNC: 3 MMOL/L (ref 2–12)
APPEARANCE UR: ABNORMAL
BACTERIA URNS QL MICRO: ABNORMAL /HPF
BASOPHILS # BLD: 0.06 K/UL (ref 0–0.1)
BASOPHILS NFR BLD: 0.9 % (ref 0–1)
BILIRUB UR QL: NEGATIVE
BUN SERPL-MCNC: 15 MG/DL (ref 6–20)
BUN/CREAT SERPL: 22 (ref 12–20)
CALCIUM SERPL-MCNC: 8.4 MG/DL (ref 8.5–10.1)
CHLORIDE SERPL-SCNC: 108 MMOL/L (ref 97–108)
CHOLEST SERPL-MCNC: 130 MG/DL
CO2 SERPL-SCNC: 30 MMOL/L (ref 21–32)
COLOR UR: ABNORMAL
CREAT SERPL-MCNC: 0.68 MG/DL (ref 0.55–1.02)
DIFFERENTIAL METHOD BLD: ABNORMAL
EKG ATRIAL RATE: 64 BPM
EKG DIAGNOSIS: NORMAL
EKG P AXIS: 12 DEGREES
EKG P-R INTERVAL: 138 MS
EKG Q-T INTERVAL: 434 MS
EKG QRS DURATION: 80 MS
EKG QTC CALCULATION (BAZETT): 447 MS
EKG R AXIS: -5 DEGREES
EKG T AXIS: 14 DEGREES
EKG VENTRICULAR RATE: 64 BPM
EOSINOPHIL # BLD: 0.1 K/UL (ref 0–0.4)
EOSINOPHIL NFR BLD: 1.4 % (ref 0–7)
EPITH CASTS URNS QL MICRO: ABNORMAL /LPF
ERYTHROCYTE [DISTWIDTH] IN BLOOD BY AUTOMATED COUNT: 14.6 % (ref 11.5–14.5)
EST. AVERAGE GLUCOSE BLD GHB EST-MCNC: 117 MG/DL
GLUCOSE SERPL-MCNC: 112 MG/DL (ref 65–100)
GLUCOSE UR STRIP.AUTO-MCNC: NEGATIVE MG/DL
HBA1C MFR BLD: 5.7 % (ref 4–5.6)
HCT VFR BLD AUTO: 35 % (ref 35–47)
HDLC SERPL-MCNC: 36 MG/DL
HDLC SERPL: 3.6 (ref 0–5)
HGB BLD-MCNC: 11.1 G/DL (ref 11.5–16)
HGB UR QL STRIP: ABNORMAL
HYALINE CASTS URNS QL MICRO: ABNORMAL /LPF (ref 0–2)
IMM GRANULOCYTES # BLD AUTO: 0.03 K/UL (ref 0–0.04)
IMM GRANULOCYTES NFR BLD AUTO: 0.4 % (ref 0–0.5)
KETONES UR QL STRIP.AUTO: NEGATIVE MG/DL
LDLC SERPL CALC-MCNC: 61.8 MG/DL (ref 0–100)
LEUKOCYTE ESTERASE UR QL STRIP.AUTO: ABNORMAL
LYMPHOCYTES # BLD: 1.31 K/UL (ref 0.8–3.5)
LYMPHOCYTES NFR BLD: 18.6 % (ref 12–49)
MCH RBC QN AUTO: 30.7 PG (ref 26–34)
MCHC RBC AUTO-ENTMCNC: 31.7 G/DL (ref 30–36.5)
MCV RBC AUTO: 97 FL (ref 80–99)
MONOCYTES # BLD: 0.51 K/UL (ref 0–1)
MONOCYTES NFR BLD: 7.2 % (ref 5–13)
NEUTS SEG # BLD: 5.03 K/UL (ref 1.8–8)
NEUTS SEG NFR BLD: 71.5 % (ref 32–75)
NITRITE UR QL STRIP.AUTO: NEGATIVE
NRBC # BLD: 0 K/UL (ref 0–0.01)
NRBC BLD-RTO: 0 PER 100 WBC
PH UR STRIP: 7.5 (ref 5–8)
PLATELET # BLD AUTO: 203 K/UL (ref 150–400)
PMV BLD AUTO: 10.9 FL (ref 8.9–12.9)
POTASSIUM SERPL-SCNC: 4.5 MMOL/L (ref 3.5–5.1)
PROT UR STRIP-MCNC: ABNORMAL MG/DL
RBC # BLD AUTO: 3.61 M/UL (ref 3.8–5.2)
RBC #/AREA URNS HPF: >100 /HPF (ref 0–5)
SODIUM SERPL-SCNC: 141 MMOL/L (ref 136–145)
SP GR UR REFRACTOMETRY: 1.01 (ref 1–1.03)
TRIGL SERPL-MCNC: 161 MG/DL
UFH PPP CHRO-ACNC: 0.11 IU/ML
UFH PPP CHRO-ACNC: 0.23 IU/ML
UFH PPP CHRO-ACNC: 0.56 IU/ML
URINE CULTURE IF INDICATED: ABNORMAL
UROBILINOGEN UR QL STRIP.AUTO: 2 EU/DL (ref 0.2–1)
VLDLC SERPL CALC-MCNC: 32.2 MG/DL
WBC # BLD AUTO: 7 K/UL (ref 3.6–11)
WBC URNS QL MICRO: >100 /HPF (ref 0–4)

## 2025-06-18 PROCEDURE — 85520 HEPARIN ASSAY: CPT

## 2025-06-18 PROCEDURE — 93452 LEFT HRT CATH W/VENTRCLGRPHY: CPT | Performed by: INTERNAL MEDICINE

## 2025-06-18 PROCEDURE — 36415 COLL VENOUS BLD VENIPUNCTURE: CPT

## 2025-06-18 PROCEDURE — 6360000002 HC RX W HCPCS: Performed by: INTERNAL MEDICINE

## 2025-06-18 PROCEDURE — 87186 SC STD MICRODIL/AGAR DIL: CPT

## 2025-06-18 PROCEDURE — 99152 MOD SED SAME PHYS/QHP 5/>YRS: CPT | Performed by: INTERNAL MEDICINE

## 2025-06-18 PROCEDURE — C1769 GUIDE WIRE: HCPCS | Performed by: INTERNAL MEDICINE

## 2025-06-18 PROCEDURE — 99232 SBSQ HOSP IP/OBS MODERATE 35: CPT | Performed by: INTERNAL MEDICINE

## 2025-06-18 PROCEDURE — 85025 COMPLETE CBC W/AUTO DIFF WBC: CPT

## 2025-06-18 PROCEDURE — 87088 URINE BACTERIA CULTURE: CPT

## 2025-06-18 PROCEDURE — 76937 US GUIDE VASCULAR ACCESS: CPT | Performed by: INTERNAL MEDICINE

## 2025-06-18 PROCEDURE — 93458 L HRT ARTERY/VENTRICLE ANGIO: CPT | Performed by: INTERNAL MEDICINE

## 2025-06-18 PROCEDURE — 2500000003 HC RX 250 WO HCPCS: Performed by: HOSPITALIST

## 2025-06-18 PROCEDURE — 6360000004 HC RX CONTRAST MEDICATION: Performed by: INTERNAL MEDICINE

## 2025-06-18 PROCEDURE — 80061 LIPID PANEL: CPT

## 2025-06-18 PROCEDURE — 6370000000 HC RX 637 (ALT 250 FOR IP): Performed by: HOSPITALIST

## 2025-06-18 PROCEDURE — 87086 URINE CULTURE/COLONY COUNT: CPT

## 2025-06-18 PROCEDURE — 2500000003 HC RX 250 WO HCPCS: Performed by: INTERNAL MEDICINE

## 2025-06-18 PROCEDURE — 81001 URINALYSIS AUTO W/SCOPE: CPT

## 2025-06-18 PROCEDURE — 80048 BASIC METABOLIC PNL TOTAL CA: CPT

## 2025-06-18 PROCEDURE — 6360000002 HC RX W HCPCS: Performed by: NURSE PRACTITIONER

## 2025-06-18 PROCEDURE — B2111ZZ FLUOROSCOPY OF MULTIPLE CORONARY ARTERIES USING LOW OSMOLAR CONTRAST: ICD-10-PCS | Performed by: INTERNAL MEDICINE

## 2025-06-18 PROCEDURE — 2709999900 HC NON-CHARGEABLE SUPPLY: Performed by: INTERNAL MEDICINE

## 2025-06-18 PROCEDURE — 83036 HEMOGLOBIN GLYCOSYLATED A1C: CPT

## 2025-06-18 PROCEDURE — 93010 ELECTROCARDIOGRAM REPORT: CPT | Performed by: STUDENT IN AN ORGANIZED HEALTH CARE EDUCATION/TRAINING PROGRAM

## 2025-06-18 PROCEDURE — 4A023N7 MEASUREMENT OF CARDIAC SAMPLING AND PRESSURE, LEFT HEART, PERCUTANEOUS APPROACH: ICD-10-PCS | Performed by: INTERNAL MEDICINE

## 2025-06-18 PROCEDURE — 6360000002 HC RX W HCPCS: Performed by: FAMILY MEDICINE

## 2025-06-18 PROCEDURE — 2060000000 HC ICU INTERMEDIATE R&B

## 2025-06-18 PROCEDURE — C1894 INTRO/SHEATH, NON-LASER: HCPCS | Performed by: INTERNAL MEDICINE

## 2025-06-18 RX ORDER — FENTANYL CITRATE 50 UG/ML
INJECTION, SOLUTION INTRAMUSCULAR; INTRAVENOUS PRN
Status: DISCONTINUED | OUTPATIENT
Start: 2025-06-18 | End: 2025-06-18 | Stop reason: HOSPADM

## 2025-06-18 RX ORDER — HEPARIN SODIUM 10000 [USP'U]/100ML
5-30 INJECTION, SOLUTION INTRAVENOUS CONTINUOUS
Status: DISCONTINUED | OUTPATIENT
Start: 2025-06-18 | End: 2025-06-20

## 2025-06-18 RX ORDER — MIDAZOLAM HYDROCHLORIDE 1 MG/ML
INJECTION INTRAMUSCULAR; INTRAVENOUS PRN
Status: DISCONTINUED | OUTPATIENT
Start: 2025-06-18 | End: 2025-06-18 | Stop reason: HOSPADM

## 2025-06-18 RX ORDER — POLYETHYLENE GLYCOL 3350 17 G/17G
17 POWDER, FOR SOLUTION ORAL DAILY PRN
Status: DISCONTINUED | OUTPATIENT
Start: 2025-06-18 | End: 2025-06-21 | Stop reason: HOSPADM

## 2025-06-18 RX ORDER — AMLODIPINE BESYLATE 5 MG/1
5 TABLET ORAL EVERY MORNING
Status: DISCONTINUED | OUTPATIENT
Start: 2025-06-19 | End: 2025-06-21 | Stop reason: HOSPADM

## 2025-06-18 RX ORDER — ASPIRIN 81 MG/1
81 TABLET ORAL DAILY
Status: DISCONTINUED | OUTPATIENT
Start: 2025-06-19 | End: 2025-06-21 | Stop reason: HOSPADM

## 2025-06-18 RX ORDER — ACETAMINOPHEN 325 MG/1
650 TABLET ORAL EVERY 6 HOURS PRN
Status: DISCONTINUED | OUTPATIENT
Start: 2025-06-18 | End: 2025-06-21 | Stop reason: HOSPADM

## 2025-06-18 RX ORDER — TIMOLOL MALEATE 5 MG/ML
1 SOLUTION/ DROPS OPHTHALMIC 2 TIMES DAILY
Status: DISCONTINUED | OUTPATIENT
Start: 2025-06-19 | End: 2025-06-21 | Stop reason: HOSPADM

## 2025-06-18 RX ORDER — BRIMONIDINE TARTRATE 2 MG/ML
1 SOLUTION/ DROPS OPHTHALMIC 2 TIMES DAILY
Status: DISCONTINUED | OUTPATIENT
Start: 2025-06-19 | End: 2025-06-21 | Stop reason: HOSPADM

## 2025-06-18 RX ORDER — LIDOCAINE HYDROCHLORIDE 10 MG/ML
INJECTION, SOLUTION INFILTRATION; PERINEURAL PRN
Status: DISCONTINUED | OUTPATIENT
Start: 2025-06-18 | End: 2025-06-18 | Stop reason: HOSPADM

## 2025-06-18 RX ORDER — IOPAMIDOL 755 MG/ML
INJECTION, SOLUTION INTRAVASCULAR PRN
Status: DISCONTINUED | OUTPATIENT
Start: 2025-06-18 | End: 2025-06-18 | Stop reason: HOSPADM

## 2025-06-18 RX ORDER — HEPARIN SODIUM 1000 [USP'U]/ML
60 INJECTION, SOLUTION INTRAVENOUS; SUBCUTANEOUS PRN
Status: DISCONTINUED | OUTPATIENT
Start: 2025-06-18 | End: 2025-06-20

## 2025-06-18 RX ORDER — ATORVASTATIN CALCIUM 20 MG/1
20 TABLET, FILM COATED ORAL DAILY
Status: DISCONTINUED | OUTPATIENT
Start: 2025-06-19 | End: 2025-06-19

## 2025-06-18 RX ORDER — SODIUM CHLORIDE 0.9 % (FLUSH) 0.9 %
5-40 SYRINGE (ML) INJECTION EVERY 12 HOURS SCHEDULED
Status: DISCONTINUED | OUTPATIENT
Start: 2025-06-19 | End: 2025-06-21 | Stop reason: HOSPADM

## 2025-06-18 RX ORDER — PANTOPRAZOLE SODIUM 40 MG/1
40 TABLET, DELAYED RELEASE ORAL
Status: DISCONTINUED | OUTPATIENT
Start: 2025-06-19 | End: 2025-06-21 | Stop reason: HOSPADM

## 2025-06-18 RX ORDER — ONDANSETRON 4 MG/1
4 TABLET, ORALLY DISINTEGRATING ORAL EVERY 8 HOURS PRN
Status: DISCONTINUED | OUTPATIENT
Start: 2025-06-18 | End: 2025-06-21 | Stop reason: HOSPADM

## 2025-06-18 RX ORDER — ACETAMINOPHEN 650 MG/1
650 SUPPOSITORY RECTAL EVERY 6 HOURS PRN
Status: DISCONTINUED | OUTPATIENT
Start: 2025-06-18 | End: 2025-06-21 | Stop reason: HOSPADM

## 2025-06-18 RX ORDER — ONDANSETRON 2 MG/ML
4 INJECTION INTRAMUSCULAR; INTRAVENOUS EVERY 6 HOURS PRN
Status: DISCONTINUED | OUTPATIENT
Start: 2025-06-18 | End: 2025-06-21 | Stop reason: HOSPADM

## 2025-06-18 RX ORDER — HEPARIN SODIUM 200 [USP'U]/100ML
INJECTION, SOLUTION INTRAVENOUS PRN
Status: DISCONTINUED | OUTPATIENT
Start: 2025-06-18 | End: 2025-06-18 | Stop reason: HOSPADM

## 2025-06-18 RX ORDER — CITALOPRAM HYDROBROMIDE 20 MG/1
20 TABLET ORAL DAILY
Status: DISCONTINUED | OUTPATIENT
Start: 2025-06-19 | End: 2025-06-21 | Stop reason: HOSPADM

## 2025-06-18 RX ORDER — LATANOPROST 50 UG/ML
1 SOLUTION/ DROPS OPHTHALMIC NIGHTLY
Status: DISCONTINUED | OUTPATIENT
Start: 2025-06-19 | End: 2025-06-21 | Stop reason: HOSPADM

## 2025-06-18 RX ORDER — SODIUM CHLORIDE 9 MG/ML
INJECTION, SOLUTION INTRAVENOUS PRN
Status: DISCONTINUED | OUTPATIENT
Start: 2025-06-18 | End: 2025-06-21 | Stop reason: HOSPADM

## 2025-06-18 RX ORDER — MAGNESIUM SULFATE IN WATER 40 MG/ML
2000 INJECTION, SOLUTION INTRAVENOUS PRN
Status: DISCONTINUED | OUTPATIENT
Start: 2025-06-18 | End: 2025-06-21 | Stop reason: HOSPADM

## 2025-06-18 RX ORDER — POTASSIUM CHLORIDE 750 MG/1
40 TABLET, EXTENDED RELEASE ORAL PRN
Status: DISCONTINUED | OUTPATIENT
Start: 2025-06-18 | End: 2025-06-21 | Stop reason: HOSPADM

## 2025-06-18 RX ORDER — POTASSIUM CHLORIDE 7.45 MG/ML
10 INJECTION INTRAVENOUS PRN
Status: DISCONTINUED | OUTPATIENT
Start: 2025-06-18 | End: 2025-06-21 | Stop reason: HOSPADM

## 2025-06-18 RX ORDER — ACETAMINOPHEN 325 MG/1
650 TABLET ORAL EVERY 4 HOURS PRN
Status: DISCONTINUED | OUTPATIENT
Start: 2025-06-18 | End: 2025-06-18 | Stop reason: HOSPADM

## 2025-06-18 RX ORDER — VERAPAMIL HYDROCHLORIDE 2.5 MG/ML
INJECTION INTRAVENOUS PRN
Status: DISCONTINUED | OUTPATIENT
Start: 2025-06-18 | End: 2025-06-18 | Stop reason: HOSPADM

## 2025-06-18 RX ORDER — HEPARIN SODIUM 1000 [USP'U]/ML
INJECTION, SOLUTION INTRAVENOUS; SUBCUTANEOUS PRN
Status: DISCONTINUED | OUTPATIENT
Start: 2025-06-18 | End: 2025-06-18 | Stop reason: HOSPADM

## 2025-06-18 RX ORDER — HEPARIN SODIUM 1000 [USP'U]/ML
30 INJECTION, SOLUTION INTRAVENOUS; SUBCUTANEOUS PRN
Status: DISCONTINUED | OUTPATIENT
Start: 2025-06-18 | End: 2025-06-20

## 2025-06-18 RX ADMIN — SODIUM CHLORIDE, PRESERVATIVE FREE 10 ML: 5 INJECTION INTRAVENOUS at 08:01

## 2025-06-18 RX ADMIN — HEPARIN SODIUM 14 UNITS/KG/HR: 10000 INJECTION, SOLUTION INTRAVENOUS at 20:14

## 2025-06-18 RX ADMIN — BRIMONIDINE TARTRATE 1 DROP: 2 SOLUTION/ DROPS OPHTHALMIC at 08:00

## 2025-06-18 RX ADMIN — CITALOPRAM 20 MG: 20 TABLET, FILM COATED ORAL at 07:59

## 2025-06-18 RX ADMIN — ASPIRIN 81 MG: 81 TABLET, COATED ORAL at 07:59

## 2025-06-18 RX ADMIN — TIMOLOL MALEATE 1 DROP: 5 SOLUTION OPHTHALMIC at 07:59

## 2025-06-18 RX ADMIN — SODIUM CHLORIDE, PRESERVATIVE FREE 10 ML: 5 INJECTION INTRAVENOUS at 20:16

## 2025-06-18 RX ADMIN — HEPARIN SODIUM 14 UNITS/KG/HR: 10000 INJECTION, SOLUTION INTRAVENOUS at 23:37

## 2025-06-18 RX ADMIN — TIMOLOL MALEATE 1 DROP: 5 SOLUTION OPHTHALMIC at 20:21

## 2025-06-18 RX ADMIN — AMLODIPINE BESYLATE 5 MG: 5 TABLET ORAL at 07:59

## 2025-06-18 RX ADMIN — LATANOPROST 1 DROP: 50 SOLUTION OPHTHALMIC at 20:15

## 2025-06-18 RX ADMIN — BRIMONIDINE TARTRATE 1 DROP: 2 SOLUTION/ DROPS OPHTHALMIC at 20:18

## 2025-06-18 RX ADMIN — ATORVASTATIN CALCIUM 20 MG: 20 TABLET, FILM COATED ORAL at 07:59

## 2025-06-18 NOTE — PLAN OF CARE
Problem: Discharge Planning  Goal: Discharge to home or other facility with appropriate resources  Outcome: Progressing     Problem: Pain  Goal: Verbalizes/displays adequate comfort level or baseline comfort level  Outcome: Progressing     Problem: Safety - Adult  Goal: Free from fall injury  Outcome: Progressing     Problem: Discharge Planning  Goal: Discharge to home or other facility with appropriate resources  Outcome: Progressing     Problem: Pain  Goal: Verbalizes/displays adequate comfort level or baseline comfort level  Outcome: Progressing     Problem: Safety - Adult  Goal: Free from fall injury  Outcome: Progressing     Problem: Skin/Tissue Integrity  Goal: Skin integrity remains intact  Description: 1.  Monitor for areas of redness and/or skin breakdown  2.  Assess vascular access sites hourly  3.  Every 4-6 hours minimum:  Change oxygen saturation probe site  4.  Every 4-6 hours:  If on nasal continuous positive airway pressure, respiratory therapy assess nares and determine need for appliance change or resting period  Outcome: Progressing

## 2025-06-18 NOTE — PROGRESS NOTES
0800 Pt about to go to cath lab. Pt had run of 's for 5 seconds. Pt now in NSR. Cardiology notified. Per Cardio, Dr. Alan has been paged. Dr. De Los Santos also notified.

## 2025-06-18 NOTE — DISCHARGE SUMMARY
[facility]  [address] [place] [address]  [phone]    [facility] Adult  Hospitalist Group     Discharge Summary       PATIENT ID: Sierra Baca  MRN: [id number]   YOB: 1945    DATE OF ADMISSION: 6/17/2025 10:54 AM    DATE OF DISCHARGE: 06/18/25   PRIMARY CARE PROVIDER: Melani Campos MD     DISCHARGING PROVIDER: Tesha De Los Santos MD      CONSULTATIONS: IP CONSULT TO CARDIOLOGY    PROCEDURES/SURGERIES: Procedure(s):  Left heart cath / coronary angiography    ADMITTING DIAGNOSES & HOSPITAL COURSE:     NSTEMI  patient has a history of CAD and had two stents placed in the past.  Initial troponin are elevated.  Continue heparin drip  Cardiology evaluated and did a heart cath which showed severe disease and they recommend transfer to [facility] for evaluation of CABG or high risk PCI  repeat echocardiogram shows an EF of 50 to 55%  Continue aspirin and statins.     2. COPD  albuterol PRN.     3. Hypertension  patient is taking Norvasc 5 mg daily at home.     4. Hyperlipidemia  patient is taking simvastatin 40 mg daily at home.     5. Glaucoma  Continue Brimonidine, dorzolamide-timolol, latanoprost     6. Acid reflux  continue Protonix 40 mg daily.     7. Anxiety and depression  continue      I personally spent 35 minutes of critical care time with the patient. Patient is at high risk of decompensation.       PENDING TEST RESULTS:   At the time of discharge the following test results are still pending: None    FOLLOW UP APPOINTMENTS:    No follow-up provider specified.       DIET: Cardiac    ACTIVITY: As tolerated      DISCHARGE MEDICATIONS:     Medication List        CONTINUE taking these medications      amLODIPine 5 MG tablet  Commonly known as: NORVASC  TAKE 1 TABLET EVERY MORNING     aspirin 81 MG EC tablet     brimonidine 0.2 % ophthalmic solution  Commonly known as: ALPHAGAN     Calcium Carbonate-Vitamin D 600-3.125 MG-MCG

## 2025-06-18 NOTE — PROGRESS NOTES
[facility]                    Cardiology Care Note     []Initial Encounter     [x]Follow-up    Patient Name: Sierra Baca - :1945 - MRN:[id number]  Primary Cardiologist: Yi Alva MD  Consulting Cardiologist: Mingo Fisher MD     Reason for encounter: elevated troponin    HPI:       Sierra Baca is a 79 y.o. female with history of COPD, coronary disease with PCI on , hypertension presents to the emergency department with a chief complaint of shortness of breath and chest pressure. Somewhat worse with walking. This has been going on for 5 or 6 days.     She had a recent UTI and she completed treatment for that. She still feels that she has urinary frequency and urgency.     Subjective:      Sierra Baca reports chest pressure/discomfort and dyspnea.     Assessment and Plan     1 ACS/NSTEMI    - ACS heparin  - LHC with MVD high risk pci vs cabg to be determined   - ECHO with preserved LV function   -lipids pending  - check HBA1C  - -pBNP ~ 4000      2 HX CAD:   Cath , MENDOZA  to proximal LAD.   Normal stress echo    Stress nuclear 2022 EF 73% ST depression of 2 mm SVT during exercise.    Continue aspirin and statin     3 HTN  - resume home norvasc     4 HLD    5 recent Uti .  - still with frequency            ____________________________________________________________    Cardiac testing  25    ECHO (TTE) COMPLETE (PRN CONTRAST/BUBBLE/STRAIN/3D) 2025  4:08 PM (Final)    Interpretation Summary    Left Ventricle: Low normal left ventricular systolic function with a visually estimated EF of 50 - 55%. Left ventricle size is normal. Normal wall thickness. See diagram for wall motion findings.    Right Ventricle: Right ventricle size is normal. Normal systolic function.    Mitral Valve: Mild to moderate regurgitation.    Tricuspid Valve: Mild regurgitation. Moderately elevated RVSP, consistent with moderate pulmonary hypertension. TR Peak Gradient is  normal    CORONARY ANGIOPLASTY WITH STENT PLACEMENT  2006,2013    [facility]    ORTHOPEDIC SURGERY  8/10/15    hip replacement    PTCA  2006    stent to LAD. 3X8mm [place].     TOTAL HIP ARTHROPLASTY  2016    UPPER GASTROINTESTINAL ENDOSCOPY  2012    reportedly normal    US GUIDED CORE BREAST BIOPSY Left 2010     Social Hx:  reports that she has never smoked. She has never used smokeless tobacco. She reports that she does not drink alcohol and does not use drugs.  Family Hx: family history includes Cancer in her brother; Heart Disease in her sister; Stroke in her mother; Suicide in her father.  Allergies   Allergen Reactions    Lamotrigine Anaphylaxis, Itching and Rash          OBJECTIVE:  Wt Readings from Last 3 Encounters:   06/17/25 59 kg (130 lb)   05/26/25 59 kg (130 lb 1.1 oz)   05/09/25 57.2 kg (126 lb)     I/O last 3 completed shifts:  In: 240 [P.O.:240]  Out: 250 [Urine:250]  No intake/output data recorded.        Physical Exam:    Vitals:   Vitals:    06/17/25 2259 06/17/25 2330 06/17/25 2355 06/18/25 0339   BP:   (!) 106/58 (!) 116/59   Pulse: 60 61 64 64   Resp:   20 20   Temp:   97.5 °F (36.4 °C) 99.5 °F (37.5 °C)   TempSrc:   Oral Oral   SpO2:   93% 94%   Weight:       Height:         Telemetry: normal sinus rhythm    Gen: Well-developed, well-nourished, in no acute distress. [place]   Neck: Supple, No JVD, No Carotid Bruit  Resp: No accessory muscle use, Clear breath sounds, No rales or rhonchi  Card: Regular Rate,Rythm, Normal S1, S2, No murmurs, rubs or gallop. No thrills.   Abd:   Soft, non-tender, non-distended, BS+   MSK: No cyanosis  Skin: No rashes    Neuro: Moving all four extremities, follows commands appropriately  Psych: Good insight, oriented to person, place, alert, Nml Affect  LE: No edema    Data Review:     Radiology:   XR Results (most recent):Xray Result (most recent):  XR CHEST PORTABLE 06/17/2025    Narrative  EXAM:  XR CHEST PORTABLE    INDICATION: Chest Pain    COMPARISON:

## 2025-06-18 NOTE — PROCEDURES
PROCEDURE NOTE  BRIEF PROCEDURE NOTE    Date of Procedure: 6/18/2025   Preoperative Diagnosis: NSTEMI  Postoperative Diagnosis: Multivessel dz    Procedure: Left heart cath, LV angiography, coronary angiography  Interventional Cardiologist: Mingo Fisher MD  Assistant : none  Anesthesia: local + IV sedation  I administered moderate sedation throughout this procedure. An independent trained observer pushed medications at my direction, and monitored the patient’s level of consciousness and physiological status throughout.  Estimated Blood Loss: Minimal    Access: R Radial Access - 6 F sheath       Catheters: RCA : JR4                    LCA : JL4    Findings:     L Main: Med; Nml    LAD: Med; Prox - Stent patent; ISR 30%; Distal to stent 90% ( at small to med D2) ; D1 - Small to med MLI; D2 - ostial 50%    LCflex: Med; Ostial 60%; Prox diffuse 30%; Mid 90%; Very small OM1; Small Om2 - MLI ; L to R collaterals+    RCA: Mid RCA - 100% ( ) - L to R collaterals present - Distal RCA fills from collaterals    LVEDP: 11 mm Hg    LVEF: Not assessed    No significant gradient across aortic valve.    PCI:none      Specimens Removed : None    Devices implanted : None    Complications: None    Closure Device: R Radial - T R band    Rec; D/w pt/family about CABG referral vs High risk PCI    See full cath note.    Complications: none    Mingo Fisher MD

## 2025-06-18 NOTE — PROGRESS NOTES
Received patient from [facility]. Armband and allergies verbally confirmed with patient.  Procedure explained and consents signed.    0818: TRANSFER - OUT REPORT:    Verbal report given to Ana ERIC & Brie Parisi salinas Baca  being transferred to [facility] for routine progression of patient care       Report consisted of patient's Situation, Background, Assessment and   Recommendations(SBAR).     Information from the following report(s) Nurse Handoff Report was reviewed with the receiving nurse.           Lines:   Peripheral IV 06/17/25 Right Antecubital (Active)   Site Assessment Clean, dry & intact 06/18/25 0814   Line Status Flushed;Normal saline locked;Capped 06/18/25 0814   Line Care Connections checked and tightened 06/18/25 0339   Phlebitis Assessment No symptoms 06/18/25 0814   Infiltration Assessment 0 06/18/25 0814   Alcohol Cap Used Yes 06/18/25 0339   Dressing Status Clean, dry & intact 06/18/25 0814   Dressing Type Transparent 06/18/25 0814   Dressing Intervention New 06/17/25 1108       Peripheral IV 06/17/25 Left Antecubital (Active)   Site Assessment Clean, dry & intact 06/18/25 0814   Line Status Flushed 06/18/25 0814   Line Care Connections checked and tightened 06/18/25 0339   Phlebitis Assessment No symptoms 06/18/25 0814   Infiltration Assessment 0 06/18/25 0814   Alcohol Cap Used Yes 06/18/25 0339   Dressing Status Clean, dry & intact 06/18/25 0814   Dressing Type Transparent 06/18/25 0814        Opportunity for questions and clarification was provided.      Patient transported with:  Registered Nurse    0858: TRANSFER - IN REPORT:    Verbal report received from Ailin Baca  being received from cath lab for routine post-op      Report consisted of patient's Situation, Background, Assessment and   Recommendations(SBAR).     Information from the following report(s) Nurse Handoff Report was reviewed with the receiving nurse.    Opportunity for questions and clarification was provided.

## 2025-06-18 NOTE — CARDIO/PULMONARY
[facility]- Referral  Chart review completed.  Noted: NSTEMI, multivessel disease by cath - CABG vs hi risk PCI, EF 50-55% (echo 6/17/25) HTN, HLD, COPD, h/o prev PCI    NSTEMI  patient meets criteria for outpatient cardiac rehab.  [facility] outpatient cardiac rehab referral will be initiated.      Further treatment pending decision for CABG vs high risk PCI.

## 2025-06-18 NOTE — CARE COORDINATION
Care Management Initial Assessment  6/18/2025 2:49 PM  If patient is discharged prior to next notation, then this note serves as note for discharge by case management.    Reason for Admission:   Shortness of breath [R06.02]  Unstable angina (HCC) [I20.0]  Elevated troponin [R79.89]  NSTEMI (non-ST elevated myocardial infarction) (HCC) [I21.4]  Chest pain, unspecified type [R07.9]  Procedure(s) (LRB):  Left heart cath / coronary angiography (N/A)  * Day of Surgery *    Patient Admission Status: Inpatient  Date Admitted to INP: 6/17  RUR: Readmission Risk Score: 12    Hospitalization in the last 30 days (Readmission):  No        Advance Care Planning:  Code Status: Full Code  Primary Healthcare Decision Maker:     Advance Directive: has NO advanced directive - not interested in additional information     __________________________________________________________________________  Assessment:      06/18/25 1448   Service Assessment   Patient Orientation Alert and Oriented   Cognition Alert   History Provided By Medical Record   Primary Caregiver Self   Support Systems Children   Prior Functional Level Independent in ADLs/IADLs   Discharge Planning   Patient expects to be discharged to: House   Services At/After Discharge   Mode of Transport at Discharge Self   Confirm Follow Up Transport Self     Comments: Patient with low readmission risk score of 12%. No identified CM needs at this time. Please consult CM for any discharge planning needs that may arise.  Per chart review, patient pending left heart cath today with cardiology.     Discharge Concerns: []Yes [x]No []Unknown   Describe:    Financial concerns/barriers: []Yes, explain: []No [x]Unknown/Not discussed  __________________________________________________________________________    Insurer:   Active Insurance as of 6/17/2025       Primary Coverage       Payor Plan Insurance Group Employer/Plan Group    HUMANA MEDICARE HUMANA CHOICE-[facility] MEDICARE [id number]        Payor Plan Address Payor Plan Phone Number Payor Plan Fax Number Effective Dates    [address] [address]   1/1/2023 - None Entered    [place] [address]         Subscriber Name Subscriber Birth Date Member ID       RIVERA BLANCO 1945 [id number]                     PCP: Melani Campos MD   Address: [address] [place] [address]   Phone number: [phone]    Pharmacy:   [facility] - [facility] [address] - P [phone] - F [phone]  [address] [address]  Phone: [phone] Fax: [phone]    NE Transport:Family       Transition of care plan:    []Unable to determine at this time. Awaiting clinical progress, and disposition recommendations.    [] Home. No assistance required.     [] Home. Pt refused recommended services.    [x] Home with family assistance as needed, and outpatient follow-up.    [] Home with Outpatient PT and outpatient follow-up   Pt aware of OP appt? []Yes, Provider:   []Not scheduled   Transport provider:     [] Home with outpatient services.    Specify:    [] Home with Home Health   - [place] of Choice offered? [] Yes, Preference:   [] NA    []SNF/IPR   -[][place]edom of Choice offered, and preferences given:   []Listing provided and preferences requested   -Status: []Pending []Accepted:    -Auth required: []Yes []No    -Auth initiated date:   -3 midnight stay required: []Yes []No  Date satisfied:     [] LTC:     [] Home with Hospice   - [place] of Choice offered? [] Yes, Preference:   [] NA    [] [facility] information provided.     [] Other:       Brie Sierra RN  Case Management Department  For questions or concerns, please PerfectServe

## 2025-06-18 NOTE — PROGRESS NOTES
D/w pt/son ( by tel - Will) . They will discuss regarding transfer to [facility] for CABG eval vs proceeding with high risk PCI  Restart heparin gtt 2 hours post TR band removal

## 2025-06-18 NOTE — PROGRESS NOTES
TRANSFER - OUT REPORT:    Verbal report given to Ana ERIC  on Sierra Baca  being transferred to [facility] Rm 456 for routine progression of patient care       Report consisted of patient's Situation, Background, Assessment and   Recommendations(SBAR).     Information from the following report(s) Nurse Handoff Report, Recent Results, Cardiac Rhythm NSR, and Neuro Assessment was reviewed with the receiving nurse.           Lines:   Peripheral IV 06/17/25 Right Antecubital (Active)   Site Assessment Clean, dry & intact 06/18/25 1509   Line Status Normal saline locked 06/18/25 1509   Line Care Connections checked and tightened 06/18/25 1509   Phlebitis Assessment No symptoms 06/18/25 1509   Infiltration Assessment 0 06/18/25 1509   Alcohol Cap Used Yes 06/18/25 1509   Dressing Status Clean, dry & intact 06/18/25 1509   Dressing Type Transparent 06/18/25 1509   Dressing Intervention New 06/17/25 1108       Peripheral IV 06/17/25 Left Antecubital (Active)   Site Assessment Clean, dry & intact 06/18/25 1509   Line Status Infusing 06/18/25 1509   Line Care Connections checked and tightened 06/18/25 1509   Phlebitis Assessment No symptoms 06/18/25 1509   Infiltration Assessment 0 06/18/25 1509   Alcohol Cap Used Yes 06/18/25 0339   Dressing Status Clean, dry & intact 06/18/25 1509   Dressing Type Transparent 06/18/25 1509        Opportunity for questions and clarification was provided.      Patient transported with:  Monitor, Pt scheduled to be picked up by [facility] at 19:26.

## 2025-06-19 ENCOUNTER — APPOINTMENT (OUTPATIENT)
Facility: HOSPITAL | Age: 80
DRG: 321 | End: 2025-06-19
Attending: HOSPITALIST
Payer: MEDICARE

## 2025-06-19 LAB
ECHO BSA: 1.62 M2
ERYTHROCYTE [DISTWIDTH] IN BLOOD BY AUTOMATED COUNT: 14.6 % (ref 11.5–14.5)
HCT VFR BLD AUTO: 35.9 % (ref 35–47)
HGB BLD-MCNC: 11.3 G/DL (ref 11.5–16)
MCH RBC QN AUTO: 31 PG (ref 26–34)
MCHC RBC AUTO-ENTMCNC: 31.5 G/DL (ref 30–36.5)
MCV RBC AUTO: 98.6 FL (ref 80–99)
NRBC # BLD: 0 K/UL (ref 0–0.01)
NRBC BLD-RTO: 0 PER 100 WBC
PLATELET # BLD AUTO: 191 K/UL (ref 150–400)
PMV BLD AUTO: 10.9 FL (ref 8.9–12.9)
RBC # BLD AUTO: 3.64 M/UL (ref 3.8–5.2)
UFH PPP CHRO-ACNC: 0.24 IU/ML
UFH PPP CHRO-ACNC: 0.25 IU/ML
UFH PPP CHRO-ACNC: 0.32 IU/ML
WBC # BLD AUTO: 5.6 K/UL (ref 3.6–11)

## 2025-06-19 PROCEDURE — B2111ZZ FLUOROSCOPY OF MULTIPLE CORONARY ARTERIES USING LOW OSMOLAR CONTRAST: ICD-10-PCS | Performed by: INTERNAL MEDICINE

## 2025-06-19 PROCEDURE — 2500000003 HC RX 250 WO HCPCS: Performed by: HOSPITALIST

## 2025-06-19 PROCEDURE — 4A023N7 MEASUREMENT OF CARDIAC SAMPLING AND PRESSURE, LEFT HEART, PERCUTANEOUS APPROACH: ICD-10-PCS | Performed by: INTERNAL MEDICINE

## 2025-06-19 PROCEDURE — 99153 MOD SED SAME PHYS/QHP EA: CPT | Performed by: INTERNAL MEDICINE

## 2025-06-19 PROCEDURE — 6370000000 HC RX 637 (ALT 250 FOR IP): Performed by: FAMILY MEDICINE

## 2025-06-19 PROCEDURE — C9600 PERC DRUG-EL COR STENT SING: HCPCS | Performed by: INTERNAL MEDICINE

## 2025-06-19 PROCEDURE — 6370000000 HC RX 637 (ALT 250 FOR IP): Performed by: INTERNAL MEDICINE

## 2025-06-19 PROCEDURE — 6360000004 HC RX CONTRAST MEDICATION: Performed by: INTERNAL MEDICINE

## 2025-06-19 PROCEDURE — C1887 CATHETER, GUIDING: HCPCS | Performed by: INTERNAL MEDICINE

## 2025-06-19 PROCEDURE — 93005 ELECTROCARDIOGRAM TRACING: CPT | Performed by: HOSPITALIST

## 2025-06-19 PROCEDURE — 85027 COMPLETE CBC AUTOMATED: CPT

## 2025-06-19 PROCEDURE — 027034Z DILATION OF CORONARY ARTERY, ONE ARTERY WITH DRUG-ELUTING INTRALUMINAL DEVICE, PERCUTANEOUS APPROACH: ICD-10-PCS | Performed by: INTERNAL MEDICINE

## 2025-06-19 PROCEDURE — 85520 HEPARIN ASSAY: CPT

## 2025-06-19 PROCEDURE — 92928 PRQ TCAT PLMT NTRAC ST 1 LES: CPT | Performed by: INTERNAL MEDICINE

## 2025-06-19 PROCEDURE — 92978 ENDOLUMINL IVUS OCT C 1ST: CPT | Performed by: INTERNAL MEDICINE

## 2025-06-19 PROCEDURE — C1713 ANCHOR/SCREW BN/BN,TIS/BN: HCPCS | Performed by: INTERNAL MEDICINE

## 2025-06-19 PROCEDURE — C1725 CATH, TRANSLUMIN NON-LASER: HCPCS | Performed by: INTERNAL MEDICINE

## 2025-06-19 PROCEDURE — 2709999900 HC NON-CHARGEABLE SUPPLY: Performed by: INTERNAL MEDICINE

## 2025-06-19 PROCEDURE — B240ZZ3 ULTRASONOGRAPHY OF SINGLE CORONARY ARTERY, INTRAVASCULAR: ICD-10-PCS | Performed by: INTERNAL MEDICINE

## 2025-06-19 PROCEDURE — 99223 1ST HOSP IP/OBS HIGH 75: CPT | Performed by: INTERNAL MEDICINE

## 2025-06-19 PROCEDURE — 93880 EXTRACRANIAL BILAT STUDY: CPT

## 2025-06-19 PROCEDURE — 6360000002 HC RX W HCPCS: Performed by: INTERNAL MEDICINE

## 2025-06-19 PROCEDURE — C1874 STENT, COATED/COV W/DEL SYS: HCPCS | Performed by: INTERNAL MEDICINE

## 2025-06-19 PROCEDURE — 2500000003 HC RX 250 WO HCPCS: Performed by: FAMILY MEDICINE

## 2025-06-19 PROCEDURE — C1753 CATH, INTRAVAS ULTRASOUND: HCPCS | Performed by: INTERNAL MEDICINE

## 2025-06-19 PROCEDURE — 6360000002 HC RX W HCPCS: Performed by: HOSPITALIST

## 2025-06-19 PROCEDURE — 85347 COAGULATION TIME ACTIVATED: CPT

## 2025-06-19 PROCEDURE — 99152 MOD SED SAME PHYS/QHP 5/>YRS: CPT | Performed by: INTERNAL MEDICINE

## 2025-06-19 PROCEDURE — C1894 INTRO/SHEATH, NON-LASER: HCPCS | Performed by: INTERNAL MEDICINE

## 2025-06-19 PROCEDURE — 6360000002 HC RX W HCPCS: Performed by: FAMILY MEDICINE

## 2025-06-19 PROCEDURE — C1769 GUIDE WIRE: HCPCS | Performed by: INTERNAL MEDICINE

## 2025-06-19 PROCEDURE — 2060000000 HC ICU INTERMEDIATE R&B

## 2025-06-19 DEVICE — STENT CORONARY ONYX FRONTIER RX 2.5X38 MM ZOTAROLIMUS ELUT: Type: IMPLANTABLE DEVICE | Status: FUNCTIONAL

## 2025-06-19 RX ORDER — ATORVASTATIN CALCIUM 40 MG/1
40 TABLET, FILM COATED ORAL DAILY
Status: DISCONTINUED | OUTPATIENT
Start: 2025-06-20 | End: 2025-06-21 | Stop reason: HOSPADM

## 2025-06-19 RX ORDER — MIDAZOLAM HYDROCHLORIDE 1 MG/ML
INJECTION, SOLUTION INTRAMUSCULAR; INTRAVENOUS PRN
Status: DISCONTINUED | OUTPATIENT
Start: 2025-06-19 | End: 2025-06-19 | Stop reason: HOSPADM

## 2025-06-19 RX ORDER — HEPARIN SODIUM 1000 [USP'U]/ML
INJECTION, SOLUTION INTRAVENOUS; SUBCUTANEOUS PRN
Status: DISCONTINUED | OUTPATIENT
Start: 2025-06-19 | End: 2025-06-19 | Stop reason: HOSPADM

## 2025-06-19 RX ORDER — IOPAMIDOL 755 MG/ML
INJECTION, SOLUTION INTRAVASCULAR PRN
Status: DISCONTINUED | OUTPATIENT
Start: 2025-06-19 | End: 2025-06-19 | Stop reason: HOSPADM

## 2025-06-19 RX ORDER — CLOPIDOGREL 300 MG/1
TABLET, FILM COATED ORAL PRN
Status: DISCONTINUED | OUTPATIENT
Start: 2025-06-19 | End: 2025-06-19 | Stop reason: HOSPADM

## 2025-06-19 RX ORDER — HEPARIN SODIUM 200 [USP'U]/100ML
INJECTION, SOLUTION INTRAVENOUS CONTINUOUS PRN
Status: COMPLETED | OUTPATIENT
Start: 2025-06-19 | End: 2025-06-19

## 2025-06-19 RX ORDER — CLOPIDOGREL BISULFATE 75 MG/1
75 TABLET ORAL DAILY
Status: DISCONTINUED | OUTPATIENT
Start: 2025-06-20 | End: 2025-06-21 | Stop reason: HOSPADM

## 2025-06-19 RX ORDER — FENTANYL CITRATE 50 UG/ML
INJECTION, SOLUTION INTRAMUSCULAR; INTRAVENOUS PRN
Status: DISCONTINUED | OUTPATIENT
Start: 2025-06-19 | End: 2025-06-19 | Stop reason: HOSPADM

## 2025-06-19 RX ADMIN — HEPARIN SODIUM 1800 UNITS: 1000 INJECTION INTRAVENOUS; SUBCUTANEOUS at 15:04

## 2025-06-19 RX ADMIN — LATANOPROST 1 DROP: 50 SOLUTION OPHTHALMIC at 20:29

## 2025-06-19 RX ADMIN — BRIMONIDINE TARTRATE 1 DROP: 2 SOLUTION OPHTHALMIC at 20:29

## 2025-06-19 RX ADMIN — TIMOLOL MALEATE 1 DROP: 5 SOLUTION OPHTHALMIC at 08:39

## 2025-06-19 RX ADMIN — ASPIRIN 81 MG: 81 TABLET, COATED ORAL at 08:38

## 2025-06-19 RX ADMIN — CITALOPRAM HYDROBROMIDE 20 MG: 20 TABLET ORAL at 08:38

## 2025-06-19 RX ADMIN — WATER 1000 MG: 1 INJECTION INTRAMUSCULAR; INTRAVENOUS; SUBCUTANEOUS at 11:43

## 2025-06-19 RX ADMIN — TIMOLOL MALEATE 1 DROP: 5 SOLUTION OPHTHALMIC at 20:29

## 2025-06-19 RX ADMIN — SODIUM CHLORIDE, PRESERVATIVE FREE 10 ML: 5 INJECTION INTRAVENOUS at 08:29

## 2025-06-19 RX ADMIN — ATORVASTATIN CALCIUM 20 MG: 20 TABLET, FILM COATED ORAL at 08:38

## 2025-06-19 RX ADMIN — AMLODIPINE BESYLATE 5 MG: 5 TABLET ORAL at 08:39

## 2025-06-19 RX ADMIN — BRIMONIDINE TARTRATE 1 DROP: 2 SOLUTION OPHTHALMIC at 08:39

## 2025-06-19 NOTE — PROGRESS NOTES
TRANSFER - OUT REPORT:    Verbal report given to Emilie ERIC on Sierra Baca  being transferred to room 466 for routine progression of patient care       Report consisted of patient's Situation, Background, Assessment and   Recommendations(SBAR).     Information from the following report(s) MAR, Recent Results, and Cardiac Rhythm NSR was reviewed with the receiving nurse.           Lines:   Peripheral IV 06/17/25 Right Antecubital (Active)   Site Assessment Clean, dry & intact 06/19/25 0823   Line Status Flushed;Capped 06/19/25 0823   Line Care Connections checked and tightened 06/19/25 0823   Phlebitis Assessment No symptoms 06/19/25 0823   Infiltration Assessment 0 06/19/25 0823   Alcohol Cap Used Yes 06/19/25 0823   Dressing Status Clean, dry & intact 06/19/25 0823   Dressing Type Transparent 06/19/25 0823   Dressing Intervention New 06/17/25 1108       Peripheral IV 06/17/25 Left Antecubital (Active)   Site Assessment Clean, dry & intact 06/19/25 0823   Line Status Infusing 06/19/25 0823   Line Care Connections checked and tightened 06/19/25 0823   Phlebitis Assessment No symptoms 06/19/25 0823   Infiltration Assessment 0 06/19/25 0823   Alcohol Cap Used Yes 06/19/25 0823   Dressing Status Clean, dry & intact 06/19/25 0823   Dressing Type Transparent 06/19/25 0823        Opportunity for questions and clarification was provided.      Patient transported with:  Monitor and Registered Nurse

## 2025-06-19 NOTE — CONSULTS
06/18/2025    VLDL 32.2 06/18/2025    CHOLHDLRATIO 3.6 06/18/2025       5 recent Uti .  - still with frequency       Future Appointments   Date Time Provider Department Center   5/15/2026 10:00 AM Yi Alva MD [facility]          ____________________________________________________________    Cardiac testing    06/17/25    CARDIAC PROCEDURE 06/18/2025  9:14 AM (Final)    Conclusion  Access: R Radial Access - 6 F sheath    Catheters: RCA : JR4  LCA : JL4    Findings:    L Main: Med; Nml    LAD: Med; Prox - Stent patent; ISR 30%; Distal to stent 90% ( at small to med D2) ; D1 - Small to med MLI; D2 - ostial 50%    LCflex: Med; Ostial 60%; Prox diffuse 30%; Mid 90%; Very small OM1; Small Om2 - MLI ; L to R collaterals+    RCA: Mid RCA - 100% ( ) - L to R collaterals present - Distal RCA fills from collaterals    LVEDP: 11 mm Hg    LVEF: Not assessed    No significant gradient across aortic valve.    PCI:none      Specimens Removed : None    Devices implanted : None    Complications: None    Closure Device: R Radial - T R band    Signed by: Mingo Fisher MD on 6/18/2025  9:14 AM    06/17/25    ECHO (TTE) COMPLETE (PRN CONTRAST/BUBBLE/STRAIN/3D) 06/17/2025  4:08 PM (Final)    Interpretation Summary    Left Ventricle: Low normal left ventricular systolic function with a visually estimated EF of 50 - 55%. Left ventricle size is normal. Normal wall thickness. See diagram for wall motion findings.    Right Ventricle: Right ventricle size is normal. Normal systolic function.    Mitral Valve: Mild to moderate regurgitation.    Tricuspid Valve: Mild regurgitation. Moderately elevated RVSP, consistent with moderate pulmonary hypertension. TR Peak Gradient is 52 mmHg.    Left Atrium: Left atrium is mildly dilated.    Image quality is fair.    Signed by: Mingo Fisher MD on 6/17/2025  4:08 PM    02/18/22    TRANSTHORACIC ECHOCARDIOGRAM (TTE) COMPLETE (CONTRAST/BUBBLE/3D PRN) 02/26/2022  1:00 PM, 02/26/2022  is  within normal limits. The pulmonary vasculature is within normal limits.    The lungs and pleural spaces are clear. The visualized bones and upper abdomen  are age-appropriate.    Impression  No acute process on portable chest.      Electronically signed by Derek Au MD       CT Result (most recent):  CT HEAD WO CONTRAST 02/13/2023    Narrative  EXAM: CT HEAD WO CONT    INDICATION: fall, head injury    COMPARISON: CT head 2/25/2017, MRI brain 6/15/2011.    CONTRAST: None.    TECHNIQUE: Unenhanced CT of the head was performed using 5 mm images. Brain and  bone windows were generated. Coronal and sagittal reformats. CT dose reduction  was achieved through use of a standardized protocol tailored for this  examination and automatic exposure control for dose modulation.    FINDINGS:  The ventricles and sulci are normal in size, shape and configuration. There is  no significant white matter disease. There is no intracranial hemorrhage,  extra-axial collection, or mass effect. The basilar cisterns are open. No CT  evidence of acute infarct.    The bone windows demonstrate no abnormalities. The visualized portions of the  paranasal sinuses and mastoid air cells are clear. Midline parieto-occipital  scalp hematoma (601-56).    Impression  Midline parieto-occipital scalp hematoma. No acute intracranial abnormality.      Lab Results   Component Value Date    WBC 5.6 06/19/2025    HGB 11.3 (L) 06/19/2025    HCT 35.9 06/19/2025    MCV 98.6 06/19/2025     06/19/2025       Recent Labs     06/18/25  0328   CHOL 130       Lab Results   Component Value Date/Time     06/18/2025 03:28 AM    K 4.5 06/18/2025 03:28 AM     06/18/2025 03:28 AM    CO2 30 06/18/2025 03:28 AM    BUN 15 06/18/2025 03:28 AM    CREATININE 0.68 06/18/2025 03:28 AM    GLUCOSE 112 06/18/2025 03:28 AM    CALCIUM 8.4 06/18/2025 03:28 AM    LABGLOM 87 06/18/2025 03:28 AM    LABGLOM 74 04/12/2024 10:37 AM    LABGLOM >60 02/22/2023 11:27 AM

## 2025-06-19 NOTE — PROGRESS NOTES
Pt sitting up tolerating PO well; no complaints.    1530: Dr Gutiérrez in to see Pt and Pt's daughter.

## 2025-06-19 NOTE — PROGRESS NOTES
4 Eyes Skin Assessment     NAME:  Sierra Baca  YOB: 1945  MEDICAL RECORD NUMBER:  [id number]    The patient is being assessed for  Admission    I agree that at least one RN has performed a thorough Head to Toe Skin Assessment on the patient. ALL assessment sites listed below have been assessed.      Areas assessed by both nurses:    Head, Face, Ears, Shoulders, Back, Chest, Arms, Elbows, Hands, Sacrum. Buttock, Coccyx, Ischium, Legs. Feet and Heels, and Under Medical Devices         Does the Patient have a Wound? No noted wound(s)       Terrence Prevention initiated by RN: Yes  Wound Care Orders initiated by RN: No    Pressure Injury (Stage 3,4, Unstageable, DTI, NWPT, and Complex wounds) if present, place Wound referral order by RN under : No    New Ostomies, if present place, Ostomy referral order under : No     Nurse 1 eSignature: Electronically signed by PHOENIX NOIWAN, RN on 6/18/25 at 10:45 PM EDT    **SHARE this note so that the co-signing nurse can place an eSignature**    Nurse 2 eSignature: Electronically signed by Marge Montague RN on 6/19/25 at 2:56 AM EDT

## 2025-06-19 NOTE — PROGRESS NOTES
Cardiac Cath Lab Procedure Area Arrival Note:    Sierra Baca arrived to Cardiac Cath Lab, Procedure Area. Patient identifiers verified with NAME and DATE OF BIRTH. Procedure verified with patient. Consent forms verified. Allergies verified. Patient informed of procedure and plan of care. Questions answered with review. Patient voiced understanding of procedure and plan of care.    Patient on cardiac monitor, non-invasive blood pressure, SPO2 monitor. O2 @ 3 lpm via NC.  IV of NS on pump at 25 ml/hr. Patient status doing well without problems. Patient is A&Ox 4. Patient reports no pain.     Patient medicated during procedure with orders obtained and verified by Dr. Gutiérrez.    Refer to patients Cardiac Cath Lab PROCEDURE REPORT for vital signs, assessment, status, and response during procedure, printed at end of case. Printed report on chart or scanned into chart.

## 2025-06-19 NOTE — CARE COORDINATION
Care Management Initial Assessment       RUR: 13%  Readmission? Yes - Transfer from [facility] 6/17-6/17  1st IM letter given? No-Needs  1st  letter given: No   Admission: NSTEMI (non-ST elevated * NSTEMI (non-ST elevated myocardial infarction).  PCI vs CABG consults    CM met w patient at bedside to introduce self/role as CM and to confirm the Facesheet.  Patient lives w her son, daughter in law and 2 grandson's in a 1 level home w a basement and 7-8 steps to enter w bilateral handrails. She reports she is ind w all ADLs, denies DME and drives.  She reports a hx pf HH a couple years ago w unk agency and denies hx of SNF/IPR.  She denies concerns w d/c home once medically ready and all questions have been answered.  CM will continue to follow for CARLITO needs.    Anticipated CARLITO needs:  Follow cardiac plan  Preferred pharmacy is [facility] in [place]  Family can transport home at d/c     06/19/25 0979   Service Assessment   Patient Orientation Alert and Oriented;Person;Place;Situation   Cognition Alert   History Provided By Patient   Primary Caregiver Self   Support Systems Children;Family Members   Patient's Healthcare Decision Maker is: Legal Next of Kin   PCP Verified by CM Yes  (Melani Fisher MD)   Last Visit to PCP Within last 3 months   Prior Functional Level Independent in ADLs/IADLs   Can patient return to prior living arrangement Yes   Ability to make needs known: Good   Family able to assist with home care needs: Yes   Would you like for me to discuss the discharge plan with any other family members/significant others, and if so, who? Yes  (SonLj [phone])   Financial Resources Medicare   Social/Functional History   Lives With Son;Other (Comment)  (Daughter in law and 2 grandson's)   Type of Home House   Home Layout One level   Home Access Stairs to enter with rails   Entrance Stairs - Number of Steps 7-8   Entrance Stairs - Rails Both   Bathroom Shower/Tub Walk-in shower   Bathroom Equipment  the first time, so that you might not have needed to return so soon? Teaching during hospitalization regarding your illness       SKYLAR Gordon CCM  Care Management   Available on [employer] or [phone]

## 2025-06-19 NOTE — PROGRESS NOTES
Hospitalist Progress Note  Minna Mandel MD  Answering service: [phone]        Date of Service:  2025  NAME:  Sierra Baca  :  1945  MRN:  [id number]      Admission Summary:   Sierra Baca is a 79 y.o. female with a pmhx CAD, COPD, GERD and depression who presents as a transfer from [facility] for NSTEMI with need for high risk PCI vs. CABG.  She initially presented to [facility] on  with chest pain, and was noted to have elevated troponin.  She was started on heparin gtt and taken to the cath lab where cath showed mid LAD ISR, circumflex, and RCA stenosis.  She is being transferred to [facility] for eval for high risk PCI versus CABG.        Interval history / Subjective:   No chest pain   No sob     No results found.      Assessment & Plan:      #CAD s/p past LAD stent  #NSTEMI  -tx from [facility] for multivessel disease with consideration for high risk PCI vs. CABG  -continue heparin gtt, ASA and statin  -consult cardiology and cardiovascular surgery      #HTN  -continue home norvasc     #glaucoma  -continue home eye drops     #depression  -continue home celexa     #GERD  -PPD          Code status: full   Prophylaxis: heparin   Care Plan discussed with: pt, rn, cm, pt/ot   Anticipated Disposition: TBD   Central Line:                Review of Systems:   Pertinent items are noted in HPI.         Vital Signs:    Last 24hrs VS reviewed since prior progress note. Most recent are:  Vitals:    25 1200   BP:    Pulse: 71   Resp:    Temp:    SpO2:          Intake/Output Summary (Last 24 hours) at 2025 1237  Last data filed at 2025 0823  Gross per 24 hour   Intake 311.59 ml   Output --   Net 311.59 ml        Physical Examination:     I had a face to face encounter with this patient and independently examined them on 2025 as outlined below:          General : alert x 3, awake, no acute distress,   HEENT:

## 2025-06-19 NOTE — PROGRESS NOTES
TRANSFER - IN REPORT:    Verbal report received from [place] [facility] on Sierra Baca  being received from procedure area for routine progression of patient care. Report consisted of patient’s Situation, Background, Assessment and Recommendations(SBAR). Information from the following report(s)SBAR, Procedure Summary, MAR, Recent Results, and Cardiac Rhythm NSR was reviewed with the receiving clinician. Opportunity for questions and clarification was provided. Assessment completed upon patient’s arrival to Cardiac Cath Lab RECOVERY AREA and care assumed.       Cardiac Cath Lab Recovery Arrival Note:    Sierra Baca arrived to CCL recovery area.  Patient procedure= PCI/RCA. Patient on cardiac monitor, non-invasive blood pressure, SPO2 monitor. On Room Air.  IV  of NS on pump at 50 ml/hr. Patient status doing well without problems. Patient is  A&Ox 4. Patient reports No Pain.    PROCEDURE SITE CHECK:    Procedure site:without any bleeding and No Hematoma, No pain/discomfort reported at procedure site.     No change in patient status. Continue to monitor patient and status.

## 2025-06-19 NOTE — PROGRESS NOTES
TRANSFER - IN REPORT:    Verbal report received from Emilie ERIC on Sierra Baca  being received from [facility] for ordered procedure      Report consisted of patient's Situation, Background, Assessment and   Recommendations(SBAR).     Information from the following report(s) Nurse Handoff Report, Adult Overview, and MAR was reviewed with the receiving nurse.    Opportunity for questions and clarification was provided.      Assessment completed upon patient's arrival to unit and care assumed.

## 2025-06-19 NOTE — CONSULTS
Cardiac Surgery   History and Physical  Referring cardiologist: Dr. Pfeiffer  CC: SOB, chest pressure, worsening MORA  Diagnosis: NSTEMI, multi vessel CAD.    Subjective:      Sierra Baca is a 79 y.o. woman with CAD, prior PCI in 2017, COPD, [place], HTN, HLD, recent UTI, depression, PTSD, pseudodementia who was referred for cardiac evaluation.  She presented to the [facility] ED on  after having SOB, chest pressure and worsening MORA for 5-6 days.  She ruled in for NSTEMI with a peak troponin of 563 and Q waves in in the inferior leads on EKG. She has a strong family history of CAD.  Her 58 yo son had CABG in February and \"dropped dead\" 2 weeks after CABG from a blood clot. Two of her sisters also  shortly after CABG with presumed blood clots.     She is a . Never smoked. No alcohol. No drugs.  She lives with her son and 2 grandsons. She is a retired CNA.     Cardiac Testing    Cardiac catheterization:  Coronary Findings    Diagnostic  Dominance: Right  Left Anterior Descending   Prox LAD to Mid LAD lesion, 30% stenosed. The lesion was previously treated using a stent (unknown type).   Mid LAD lesion, 90% stenosed with 50% stenosed side branch in 2nd Diag.      Left Circumflex   Ost Cx to Prox Cx lesion, 60% stenosed.   Prox Cx lesion, 30% stenosed.   Mid Cx lesion, 90% stenosed.      Right Coronary Artery   Mid RCA lesion, 100% stenosed.         ECHO:  Interpretation Summary  Show Result Comparison     Left Ventricle: Low normal left ventricular systolic function with a visually estimated EF of 50 - 55%. Left ventricle size is normal. Normal wall thickness. See diagram for wall motion findings.    Right Ventricle: Right ventricle size is normal. Normal systolic function.    Mitral Valve: Mild to moderate regurgitation.    Tricuspid Valve: Mild regurgitation. Moderately elevated RVSP, consistent with moderate pulmonary hypertension. TR Peak Gradient is 52 mmHg.    Left Atrium: Left atrium is mildly

## 2025-06-19 NOTE — PROGRESS NOTES
CATH LAB to RECOVERY ROOM REPORT    Procedure: PCI with Stent Placement    MD: TARAS Gutiérrez MD    The procedure was an intervention with 1 stent(s) placed to the RCA.    Verbal Report given to Recovery Nurse on patient being transferred to Cardiac Cath Lab  for routine post-op. Patient stable upon transfer to .  Vitals, mental status, MAR, procedural summary discussed with recovery RN.    Medication given during procedure:    Contrast:50mL                          Heparin: 8000 units     Versed: 2 mg                                                               Fentanyl: 100 mcg                                                           Plavix: 600 mg         Sheaths:    Right radial sheath pulled at 1645 pm, band at 11mL of air

## 2025-06-19 NOTE — H&P
History and Physical    Date of Service:  6/18/2025  Primary Care Provider: Melani Campos MD  Source of information: patient, electronic medical record    Chief Complaint: No chief complaint on file.      History of Presenting Illness:   Sierra Baca is a 79 y.o. female with a pmhx CAD, COPD, GERD and depression who presents as a transfer from [facility] for NSTEMI with need for high risk PCI vs. CABG.  She initially presented to [facility] on 6/17 with chest pain, and was noted to have elevated troponin.  She was started on heparin gtt and taken to the cath lab where cath showed mid LAD ISR, circumflex, and RCA stenosis.  She is being transferred to [facility] for eval for high risk PCI versus CABG.      REVIEW OF SYSTEMS:  A comprehensive review of systems was negative except for that written in the History of Present Illness.     Past Medical History:   Diagnosis Date    Anxiety     Arthritis     CAD (coronary artery disease)     COPD (chronic obstructive pulmonary disease) ([facility]) 1/7/2016    Coronary atherosclerosis of native coronary artery 2006    MI/Left ventricular wall motion is segmentally abnormal with mild hypokinesis of the anterior wall. Ejection Fraction is estimated at 50%. LAD >90% stenosis. LCX 50% stenosis.     Depression     Elevated BP 2/1/2017    Family history of suicide     father    Fatigue     GERD (gastroesophageal reflux disease)     Glaucoma     Headache 1988    migraines    Hypertension, essential 11/1/2017    Incontinence in female     Lung nodule     Menopause     Mixed hyperlipidemia     Osteopenia 2015    DEXA 2010, 2015    S/P angioplasty with stent     stent to LAD. 3X8mm [place].       Past Surgical History:   Procedure Laterality Date    CARDIAC CATHETERIZATION  2006    Left ventricular wall motion is segmentally abnormal with mild hypokinesis of the anterior wall. Ejection Fraction is estimated at 50%. LAD >90% stenosis. LCX 50% stenosis.     CARDIAC CATHETERIZATION  2014 2

## 2025-06-19 NOTE — PROGRESS NOTES
0730: Bedside shift change report given to HERNESTO Phan (oncoming nurse) by [place], RN (offgoing nurse). Report included the following information Nurse Handoff Report, MAR, and Recent Results.     1200: Anti-Xa drawn and sent to lab.    1505: Heparin drip adjusted per order, see MAR.    1515: Report given to HERNESTO Stuart on patient Sierra Baca transferring to cath lab.    1530: Patient down to cath lab with nurse and monitor.     1725: Report received from HERNESTO Stuart on patient Sierra Baca returning to room 456.    1800: Patient returned to room 456. Right radial site clean, dry, and intact with no bleeding or hematoma noted.     1904: 2mL removed from TR band.     1930: Bedside shift change report given to [place], RN (oncoming nurse) by HERNESTO Phan (offgoing nurse). Report included the following information Nurse Handoff Report, MAR, and Recent Results.

## 2025-06-20 LAB
ANION GAP SERPL CALC-SCNC: 6 MMOL/L (ref 2–12)
BACTERIA SPEC CULT: ABNORMAL
BUN SERPL-MCNC: 16 MG/DL (ref 6–20)
BUN/CREAT SERPL: 25 (ref 12–20)
CALCIUM SERPL-MCNC: 8.5 MG/DL (ref 8.5–10.1)
CC UR VC: ABNORMAL
CHLORIDE SERPL-SCNC: 112 MMOL/L (ref 97–108)
CO2 SERPL-SCNC: 23 MMOL/L (ref 21–32)
CREAT SERPL-MCNC: 0.65 MG/DL (ref 0.55–1.02)
ECHO BSA: 1.62 M2
ECHO BSA: 1.64 M2
EKG ATRIAL RATE: 70 BPM
EKG ATRIAL RATE: 74 BPM
EKG DIAGNOSIS: NORMAL
EKG DIAGNOSIS: NORMAL
EKG P AXIS: 45 DEGREES
EKG P AXIS: 53 DEGREES
EKG P-R INTERVAL: 132 MS
EKG P-R INTERVAL: 136 MS
EKG Q-T INTERVAL: 442 MS
EKG Q-T INTERVAL: 450 MS
EKG QRS DURATION: 90 MS
EKG QRS DURATION: 92 MS
EKG QTC CALCULATION (BAZETT): 486 MS
EKG QTC CALCULATION (BAZETT): 490 MS
EKG R AXIS: -11 DEGREES
EKG R AXIS: -2 DEGREES
EKG T AXIS: 28 DEGREES
EKG T AXIS: 59 DEGREES
EKG VENTRICULAR RATE: 70 BPM
EKG VENTRICULAR RATE: 74 BPM
GLUCOSE SERPL-MCNC: 101 MG/DL (ref 65–100)
POTASSIUM SERPL-SCNC: 4.2 MMOL/L (ref 3.5–5.1)
SERVICE CMNT-IMP: ABNORMAL
SODIUM SERPL-SCNC: 141 MMOL/L (ref 136–145)

## 2025-06-20 PROCEDURE — 2060000000 HC ICU INTERMEDIATE R&B

## 2025-06-20 PROCEDURE — B241ZZ3 ULTRASONOGRAPHY OF MULTIPLE CORONARY ARTERIES, INTRAVASCULAR: ICD-10-PCS | Performed by: INTERNAL MEDICINE

## 2025-06-20 PROCEDURE — 97165 OT EVAL LOW COMPLEX 30 MIN: CPT

## 2025-06-20 PROCEDURE — C1725 CATH, TRANSLUMIN NON-LASER: HCPCS | Performed by: INTERNAL MEDICINE

## 2025-06-20 PROCEDURE — 99152 MOD SED SAME PHYS/QHP 5/>YRS: CPT | Performed by: INTERNAL MEDICINE

## 2025-06-20 PROCEDURE — 2500000003 HC RX 250 WO HCPCS: Performed by: FAMILY MEDICINE

## 2025-06-20 PROCEDURE — C1894 INTRO/SHEATH, NON-LASER: HCPCS | Performed by: INTERNAL MEDICINE

## 2025-06-20 PROCEDURE — 97535 SELF CARE MNGMENT TRAINING: CPT

## 2025-06-20 PROCEDURE — 97116 GAIT TRAINING THERAPY: CPT

## 2025-06-20 PROCEDURE — 6370000000 HC RX 637 (ALT 250 FOR IP): Performed by: NURSE PRACTITIONER

## 2025-06-20 PROCEDURE — 92928 PRQ TCAT PLMT NTRAC ST 1 LES: CPT | Performed by: INTERNAL MEDICINE

## 2025-06-20 PROCEDURE — C9600 PERC DRUG-EL COR STENT SING: HCPCS | Performed by: INTERNAL MEDICINE

## 2025-06-20 PROCEDURE — 97161 PT EVAL LOW COMPLEX 20 MIN: CPT

## 2025-06-20 PROCEDURE — B2111ZZ FLUOROSCOPY OF MULTIPLE CORONARY ARTERIES USING LOW OSMOLAR CONTRAST: ICD-10-PCS | Performed by: INTERNAL MEDICINE

## 2025-06-20 PROCEDURE — 027035Z DILATION OF CORONARY ARTERY, ONE ARTERY WITH TWO DRUG-ELUTING INTRALUMINAL DEVICES, PERCUTANEOUS APPROACH: ICD-10-PCS | Performed by: INTERNAL MEDICINE

## 2025-06-20 PROCEDURE — 4A023N7 MEASUREMENT OF CARDIAC SAMPLING AND PRESSURE, LEFT HEART, PERCUTANEOUS APPROACH: ICD-10-PCS | Performed by: INTERNAL MEDICINE

## 2025-06-20 PROCEDURE — 92979 ENDOLUMINL IVUS OCT C EA: CPT | Performed by: INTERNAL MEDICINE

## 2025-06-20 PROCEDURE — C1713 ANCHOR/SCREW BN/BN,TIS/BN: HCPCS | Performed by: INTERNAL MEDICINE

## 2025-06-20 PROCEDURE — 6370000000 HC RX 637 (ALT 250 FOR IP): Performed by: FAMILY MEDICINE

## 2025-06-20 PROCEDURE — C1887 CATHETER, GUIDING: HCPCS | Performed by: INTERNAL MEDICINE

## 2025-06-20 PROCEDURE — 6360000002 HC RX W HCPCS: Performed by: HOSPITALIST

## 2025-06-20 PROCEDURE — 2709999900 HC NON-CHARGEABLE SUPPLY: Performed by: INTERNAL MEDICINE

## 2025-06-20 PROCEDURE — 99153 MOD SED SAME PHYS/QHP EA: CPT | Performed by: INTERNAL MEDICINE

## 2025-06-20 PROCEDURE — 2500000003 HC RX 250 WO HCPCS: Performed by: HOSPITALIST

## 2025-06-20 PROCEDURE — C1753 CATH, INTRAVAS ULTRASOUND: HCPCS | Performed by: INTERNAL MEDICINE

## 2025-06-20 PROCEDURE — 92978 ENDOLUMINL IVUS OCT C 1ST: CPT | Performed by: INTERNAL MEDICINE

## 2025-06-20 PROCEDURE — 85347 COAGULATION TIME ACTIVATED: CPT

## 2025-06-20 PROCEDURE — C1874 STENT, COATED/COV W/DEL SYS: HCPCS | Performed by: INTERNAL MEDICINE

## 2025-06-20 PROCEDURE — 6360000002 HC RX W HCPCS: Performed by: INTERNAL MEDICINE

## 2025-06-20 PROCEDURE — 93458 L HRT ARTERY/VENTRICLE ANGIO: CPT | Performed by: INTERNAL MEDICINE

## 2025-06-20 PROCEDURE — 93005 ELECTROCARDIOGRAM TRACING: CPT | Performed by: INTERNAL MEDICINE

## 2025-06-20 PROCEDURE — 6360000004 HC RX CONTRAST MEDICATION: Performed by: INTERNAL MEDICINE

## 2025-06-20 PROCEDURE — 80048 BASIC METABOLIC PNL TOTAL CA: CPT

## 2025-06-20 PROCEDURE — C1769 GUIDE WIRE: HCPCS | Performed by: INTERNAL MEDICINE

## 2025-06-20 PROCEDURE — 99233 SBSQ HOSP IP/OBS HIGH 50: CPT | Performed by: INTERNAL MEDICINE

## 2025-06-20 DEVICE — STENT ONYXNG22508UX ONYX 2.25X08RX
Type: IMPLANTABLE DEVICE | Status: FUNCTIONAL
Brand: ONYX FRONTIER™

## 2025-06-20 DEVICE — STENT ONYXNG27518UX ONYX 2.75X18RX
Type: IMPLANTABLE DEVICE | Status: FUNCTIONAL
Brand: ONYX FRONTIER™

## 2025-06-20 RX ORDER — LIDOCAINE HYDROCHLORIDE 10 MG/ML
INJECTION, SOLUTION INFILTRATION; PERINEURAL PRN
Status: DISCONTINUED | OUTPATIENT
Start: 2025-06-20 | End: 2025-06-20 | Stop reason: HOSPADM

## 2025-06-20 RX ORDER — MIDAZOLAM HYDROCHLORIDE 1 MG/ML
INJECTION, SOLUTION INTRAMUSCULAR; INTRAVENOUS PRN
Status: DISCONTINUED | OUTPATIENT
Start: 2025-06-20 | End: 2025-06-20 | Stop reason: HOSPADM

## 2025-06-20 RX ORDER — FENTANYL CITRATE 50 UG/ML
INJECTION, SOLUTION INTRAMUSCULAR; INTRAVENOUS PRN
Status: DISCONTINUED | OUTPATIENT
Start: 2025-06-20 | End: 2025-06-20 | Stop reason: HOSPADM

## 2025-06-20 RX ORDER — IOPAMIDOL 755 MG/ML
INJECTION, SOLUTION INTRAVASCULAR PRN
Status: DISCONTINUED | OUTPATIENT
Start: 2025-06-20 | End: 2025-06-20 | Stop reason: HOSPADM

## 2025-06-20 RX ORDER — HEPARIN SODIUM 1000 [USP'U]/ML
INJECTION, SOLUTION INTRAVENOUS; SUBCUTANEOUS PRN
Status: DISCONTINUED | OUTPATIENT
Start: 2025-06-20 | End: 2025-06-20 | Stop reason: HOSPADM

## 2025-06-20 RX ADMIN — WATER 1000 MG: 1 INJECTION INTRAMUSCULAR; INTRAVENOUS; SUBCUTANEOUS at 12:40

## 2025-06-20 RX ADMIN — CITALOPRAM HYDROBROMIDE 20 MG: 20 TABLET ORAL at 08:48

## 2025-06-20 RX ADMIN — TIMOLOL MALEATE 1 DROP: 5 SOLUTION OPHTHALMIC at 08:50

## 2025-06-20 RX ADMIN — ACETAMINOPHEN 650 MG: 325 TABLET ORAL at 20:14

## 2025-06-20 RX ADMIN — AMLODIPINE BESYLATE 5 MG: 5 TABLET ORAL at 08:48

## 2025-06-20 RX ADMIN — SODIUM CHLORIDE, PRESERVATIVE FREE 10 ML: 5 INJECTION INTRAVENOUS at 20:15

## 2025-06-20 RX ADMIN — TIMOLOL MALEATE 1 DROP: 5 SOLUTION OPHTHALMIC at 20:14

## 2025-06-20 RX ADMIN — BRIMONIDINE TARTRATE 1 DROP: 2 SOLUTION OPHTHALMIC at 08:49

## 2025-06-20 RX ADMIN — BRIMONIDINE TARTRATE 1 DROP: 2 SOLUTION OPHTHALMIC at 20:14

## 2025-06-20 RX ADMIN — ATORVASTATIN CALCIUM 40 MG: 40 TABLET, FILM COATED ORAL at 08:48

## 2025-06-20 RX ADMIN — ASPIRIN 81 MG: 81 TABLET, COATED ORAL at 08:47

## 2025-06-20 RX ADMIN — CLOPIDOGREL BISULFATE 75 MG: 75 TABLET, FILM COATED ORAL at 08:47

## 2025-06-20 RX ADMIN — LATANOPROST 1 DROP: 50 SOLUTION OPHTHALMIC at 20:14

## 2025-06-20 RX ADMIN — SODIUM CHLORIDE, PRESERVATIVE FREE 10 ML: 5 INJECTION INTRAVENOUS at 08:50

## 2025-06-20 ASSESSMENT — PAIN SCALES - GENERAL
PAINLEVEL_OUTOF10: 5
PAINLEVEL_OUTOF10: 5

## 2025-06-20 ASSESSMENT — PAIN DESCRIPTION - LOCATION
LOCATION: HEAD
LOCATION: HEAD

## 2025-06-20 ASSESSMENT — PAIN DESCRIPTION - DESCRIPTORS
DESCRIPTORS: ACHING
DESCRIPTORS: ACHING

## 2025-06-20 NOTE — PLAN OF CARE
Problem: Physical Therapy - Adult  Goal: By Discharge: Performs mobility at highest level of function for planned discharge setting.  See evaluation for individualized goals.  Description: FUNCTIONAL STATUS PRIOR TO ADMISSION: Patient was independent and active without use of DME.    HOME SUPPORT PRIOR TO ADMISSION: The patient lived with family but did not require assistance.    Physical Therapy Goals  Initiated 6/20/2025  1.  Patient will move from supine to sit and sit to supine, scoot up and down, and roll side to side in bed with independence within 7 day(s).    2.  Patient will perform sit to stand with independence within 7 day(s).  3.  Patient will transfer from bed to chair and chair to bed with independence using the least restrictive device within 7 day(s).  4.  Patient will ambulate with modified independence for 300 feet with the least restrictive device within 7 day(s).   5.  Patient will ascend/descend 8 stairs with handrail(s) with modified independence within 7 day(s).    Outcome: Progressing   PHYSICAL THERAPY EVALUATION    Patient: Sierra Baca (79 y.o. female)  Date: 6/20/2025  Primary Diagnosis: NSTEMI (non-ST elevated myocardial infarction) (Formerly [facility]) [I21.4]  Procedure(s) (LRB):  Percutaneous coronary intervention (N/A) 1 Day Post-Op   Precautions:              ASSESSMENT :   DEFICITS/IMPAIRMENTS:   The patient is limited by decreased functional mobility, endurance, balance, and gait following admission from OSH for PCI 2* NSTEMI, now POD 1. Note plans for possible additional procedures early next week. At baseline, she lives at home with family and is fully indep and active without AD.    Received pt sitting EOB, cleared for mobility by RN. She was able to complete all transfers with SUP and demo good standing balance while completing  various functional based tasks. Gait training initiated on unit without AD; demos slowed, shuffled pace but no overt LOB or difficulty with mild dynamic

## 2025-06-20 NOTE — PROGRESS NOTES
CATH LAB to RECOVERY ROOM REPORT    Procedure: PCI with Stent Placement to Circumflex and LAD    MD: TARAS Gutiérrez MD    The procedure was an intervention with 1 stent(s) placed to the mid Circumflex and 1 stent placed to mid LAD.    Verbal Report given to Recovery Nurse on patient being transferred to Cardiac Cath Lab RR for routine post-op. Patient stable upon transfer to .  Vitals, mental status, MAR, procedural summary discussed with recovery RN.    Medication given during procedure:    Contrast: 80 mL                          Heparin:  7000 units     Versed: 2 mg                                                               Fentanyl: 100 mcg                                                           Other Meds/Drips given:    NS Bolus 500 ml IV    Nitroglycerine 600 mcg IC    Sheaths:    Right radial sheath pulled at 1715 pm, band at 13mL of air.

## 2025-06-20 NOTE — PROGRESS NOTES
Cardiac Cath Lab Procedure Area Arrival Note:    Sierra Baca arrived to Cardiac Cath Lab, Procedure Area. Patient identifiers verified with NAME and DATE OF BIRTH. Procedure verified with patient. Consent forms verified. Allergies verified. Patient informed of procedure and plan of care. Questions answered with review. Patient voiced understanding of procedure and plan of care.    Patient on cardiac monitor, non-invasive blood pressure, SPO2 monitor. O2 @ 3 lpm via NC.  IV of Ns on pump at 50 ml/hr. Patient status doing well without problems. Patient is A&Ox 4. Patient reports no pain.     Patient medicated during procedure with orders obtained and verified by Dr. Gutiérrez.    Refer to patients Cardiac Cath Lab PROCEDURE REPORT for vital signs, assessment, status, and response during procedure, printed at end of case. Printed report on chart or scanned into chart.

## 2025-06-20 NOTE — PLAN OF CARE
Problem: Discharge Planning  Goal: Discharge to home or other facility with appropriate resources  Outcome: Progressing     Problem: Safety - Adult  Goal: Free from fall injury  Outcome: Progressing     Problem: Physical Therapy - Adult  Goal: By Discharge: Performs mobility at highest level of function for planned discharge setting.  See evaluation for individualized goals.  Description: FUNCTIONAL STATUS PRIOR TO ADMISSION: Patient was independent and active without use of DME.    HOME SUPPORT PRIOR TO ADMISSION: The patient lived with family but did not require assistance.    Physical Therapy Goals  Initiated 6/20/2025  1.  Patient will move from supine to sit and sit to supine, scoot up and down, and roll side to side in bed with independence within 7 day(s).    2.  Patient will perform sit to stand with independence within 7 day(s).  3.  Patient will transfer from bed to chair and chair to bed with independence using the least restrictive device within 7 day(s).  4.  Patient will ambulate with modified independence for 300 feet with the least restrictive device within 7 day(s).   5.  Patient will ascend/descend 8 stairs with handrail(s) with modified independence within 7 day(s).    6/20/2025 1003 by Genna De Souza, PT  Outcome: Progressing     Problem: Occupational Therapy - Adult  Goal: By Discharge: Performs self-care activities at highest level of function for planned discharge setting.  See evaluation for individualized goals.  Description: FUNCTIONAL STATUS PRIOR TO ADMISSION: Patient was overall indep for ADL, indep for functional mobility without AE.. Lived with family, frequently checks on a neighbor across the street. Patient completes all ADL/IADL without assist including grocery shopping and driving.      Home Equipment: None  Prior Level of Assist for ADLs: Independent  Prior Level of Assist for Homemaking: Independent  Ambulation Assistance: Independent  Prior Level of Assist for Transfers:

## 2025-06-20 NOTE — PLAN OF CARE
Problem: Physical Therapy - Adult  Goal: By Discharge: Performs mobility at highest level of function for planned discharge setting.  See evaluation for individualized goals.  Description: FUNCTIONAL STATUS PRIOR TO ADMISSION: Patient was independent and active without use of DME.    HOME SUPPORT PRIOR TO ADMISSION: The patient lived with family but did not require assistance.    Physical Therapy Goals  Initiated 6/20/2025  1.  Patient will move from supine to sit and sit to supine, scoot up and down, and roll side to side in bed with independence within 7 day(s).    2.  Patient will perform sit to stand with independence within 7 day(s).  3.  Patient will transfer from bed to chair and chair to bed with independence using the least restrictive device within 7 day(s).  4.  Patient will ambulate with modified independence for 300 feet with the least restrictive device within 7 day(s).   5.  Patient will ascend/descend 8 stairs with handrail(s) with modified independence within 7 day(s).    6/20/2025 1003 by Genna De Souza, PT  Outcome: Progressing     Problem: Occupational Therapy - Adult  Goal: By Discharge: Performs self-care activities at highest level of function for planned discharge setting.  See evaluation for individualized goals.  Description: FUNCTIONAL STATUS PRIOR TO ADMISSION: Patient was overall indep for ADL, indep for functional mobility without AE.. Lived with family, frequently checks on a neighbor across the street. Patient completes all ADL/IADL without assist including grocery shopping and driving.      Home Equipment: None  Prior Level of Assist for ADLs: Independent  Prior Level of Assist for Homemaking: Independent  Ambulation Assistance: Independent  Prior Level of Assist for Transfers: Independent  Active : Yes     Occupational Therapy Goals:  Initiated 6/20/2025  1.  Patient will perform standing grooming with [place] within 7 day(s).  2.  Patient will perform LE dressing with

## 2025-06-20 NOTE — PROGRESS NOTES
Brief note: After discussion, pt elected to proceed with PCI. She went for [facility] on 6/19 with Dr. Gutiérrez and they placed a MENDOZA to the RCA.   Plan is for staged PCI to LAD and circumflex in next few days to week.     Cardiac Surgery will sign off. Please re-consult if further assistance is needed.     Arabella Knight NP

## 2025-06-20 NOTE — PLAN OF CARE
Problem: Occupational Therapy - Adult  Goal: By Discharge: Performs self-care activities at highest level of function for planned discharge setting.  See evaluation for individualized goals.  Description: FUNCTIONAL STATUS PRIOR TO ADMISSION: Patient was overall indep for ADL, indep for functional mobility without AE.. Lived with family, frequently checks on a neighbor across the street. Patient completes all ADL/IADL without assist including grocery shopping and driving.      Home Equipment: None  Prior Level of Assist for ADLs: Independent  Prior Level of Assist for Homemaking: Independent  Ambulation Assistance: Independent  Prior Level of Assist for Transfers: Independent  Active : Yes     Occupational Therapy Goals:  Initiated 6/20/2025  1.  Patient will perform standing grooming with [place] within 7 day(s).  2.  Patient will perform LE dressing with [place] within 7 day(s).  3.  Patient will perform UE dressing with [place] within 7 day(s).  4.  Patient will perform toilet transfers with Supervision  within 7 day(s).  5.  Patient will perform all aspects of toileting with Supervision within 7 day(s).  6.  Patient will participate in upper extremity therapeutic exercise/activities with [place] for 10 minutes within 7 day(s).    7.  Patient will utilize energy conservation techniques during functional activities with verbal cues within 7 day(s).    Outcome: Progressing   OCCUPATIONAL THERAPY EVALUATION    Patient: Sierra Baca (79 y.o. female)  Date: 6/20/2025  Primary Diagnosis: NSTEMI (non-ST elevated myocardial infarction) (HCC) [I21.4]  Procedure(s) (LRB):  Percutaneous coronary intervention (N/A) 1 Day Post-Op     Precautions: Fall Risk                    ASSESSMENT :  The patient is limited by decreased functional mobility, independence in ADLs, high-level IADLs, ROM, strength, endurance, coordination, balance.    Patient received up in chair, amenable to session. Able to  for bathing (which includes washing, rinsing, drying)?: A Little  How much help is needed for toileting (which includes using toilet, bedpan, or urinal)?: A Little  How much help is needed for putting on and taking off regular upper body clothing?: None  How much help is needed for taking care of personal grooming?: None  How much help for eating meals?: None  AM-[facility] Inpatient Daily Activity Raw Score: 21  AM-PAC Inpatient ADL T-Scale Score : 44.27  ADL Inpatient CMS 0-100% Score: 32.79  ADL Inpatient CMS G-Code Modifier : CJ     Interpretation of Tool:  Represents clinically-significant functional categories (i.e. Activities of daily living).  Cutoff score 39.4 (19) correlates to a good likelihood of discharging home versus a facility  María Sharp, Brie Lopez, Donny Vigil, Moni Velez, Kenny Ferreira, Osbaldo Sharp, -PAC “6-Clicks” Functional Assessment Scores Predict Acute Care Hospital Discharge Destination, Physical Therapy, Volume 94, Issue 9, 1 September 2014, Pages 8133-3502, [email or web address]://[email or web address]         Pain Rating:  Did not report    Pain Intervention(s):       Activity Tolerance:   Good and requires rest breaks    After treatment:   Patient left in no apparent distress sitting up in chair, Call bell within reach, Bed/ chair alarm activated, and Caregiver / family present    COMMUNICATION/EDUCATION:   The patient's plan of care was discussed with: physical therapist and registered nurse    Patient Education  Education Given To: Patient  Education Provided: Role of Therapy;Precautions;ADL Adaptive Strategies;Transfer Training;Energy Conservation;Fall Prevention Strategies;Family Education  Education Method: Demonstration;Verbal  Barriers to Learning: None  Education Outcome: Verbalized understanding;Demonstrated understanding    Thank you for this referral.  Heather Alexander OT       Occupational Therapy Evaluation Charge Determination   History Examination

## 2025-06-20 NOTE — PROGRESS NOTES
[facility] CARDIOLOGY                    Cardiology Care Note     []Initial Encounter     [x]Follow-up    Patient Name: Sierra Baca - :1945 - MRN:[id number]  Primary Cardiologist: Yi Alva MD  Consulting Cardiologist: Mingo Fisher MD     Reason for encounter: elevated troponin    Subjective: on RA.  Denies CP       Assessment and Plan     1 ACS/NSTEMI/CAD w/ hx of MENDOZA  to prox MATY  -s/p PCI  of subtotal RCA (Culprit lesion).   Plan to proceed with PCI of LAD and Lcx today.    I discussed the risks/benefits/alternatives of the procedure with the patient. Risks include (but are not limited to) bleeding, infection,stroke,heart attack, need for emergency surgery/pericardiocentesis, need for dialysis or death. The patient understands and agrees to proceed.    - TTE  LV EF 50-55%, mild to mod MR, Mild TR, Elevated RVSP 52 mmHg, mod Pulm HTN  - A1c 5.7   -asa/high intensity statin/plavix.    -Cardiac rehab consult       2. HTN  - home norvasc     3.  HLD: PTA on zocor 40mg,  change to high intensity lipitor 40mg daily.   Goal LDL <55  Lab Results   Component Value Date    CHOL 130 2025    TRIG 161 (H) 2025    HDL 36 2025    LDL 61.8 2025    VLDL 32.2 2025    CHOLHDLRATIO 3.6 2025       4. recent UTI - management per hospitalist      Future Appointments   Date Time Provider Department Center   5/15/2026 10:00 AM Yi Alva MD [facility]         Addemdum: Multivessel PCI completed with stent to LAD and stent to LCx. Can be discharged home tomorrow        HPI:       Sierra Baca is a 79 y.o. female with history of COPD, coronary disease with PCI on , hypertension presents to the emergency department with a chief complaint of shortness of breath and chest pressure. Somewhat worse with walking. This has been going on for 5 or 6 days.     She had a recent UTI and she completed treatment for that. She still feels that she has urinary

## 2025-06-20 NOTE — PROGRESS NOTES
TRANSFER - OUT REPORT:    Verbal report given to HERNESTO Rao on Sierra Baca  being transferred to [facility] for routine progression of patient care       Report consisted of patient's Situation, Background, Assessment and   Recommendations(SBAR).     Information from the following report(s) Nurse Handoff Report, Adult Overview, Surgery Report, Intake/Output, MAR, Recent Results, and Cardiac Rhythm Sinus was reviewed with the receiving nurse.           Lines:   Peripheral IV 06/17/25 Right Antecubital (Active)   Site Assessment Clean, dry & intact 06/20/25 0800   Line Status Normal saline locked 06/20/25 0800   Line Care Connections checked and tightened 06/20/25 0300   Phlebitis Assessment No symptoms 06/20/25 0800   Infiltration Assessment 0 06/20/25 0800   Alcohol Cap Used Yes 06/20/25 0800   Dressing Status Clean, dry & intact 06/20/25 0800   Dressing Type Transparent 06/20/25 0800   Dressing Intervention New 06/17/25 1108       Peripheral IV 06/17/25 Left Antecubital (Active)   Site Assessment Clean, dry & intact 06/20/25 0800   Line Status Normal saline locked 06/20/25 0800   Line Care Connections checked and tightened 06/20/25 0300   Phlebitis Assessment No symptoms 06/20/25 0800   Infiltration Assessment 0 06/20/25 0800   Alcohol Cap Used Yes 06/20/25 0800   Dressing Status Clean, dry & intact 06/20/25 0800   Dressing Type Transparent 06/20/25 0800        Opportunity for questions and clarification was provided.      Patient transported with:  Monitor and Registered Nurse

## 2025-06-20 NOTE — PROGRESS NOTES
Hospitalist Progress Note  Minna Mandel MD  Answering service: [phone]        Date of Service:  2025  NAME:  Sierra Baca  :  1945  MRN:  [id number]      Admission Summary:   Sierra Baca is a 79 y.o. female with a pmhx CAD, COPD, GERD and depression who presents as a transfer from [facility] for NSTEMI with need for high risk PCI vs. CABG.  She initially presented to [facility] on  with chest pain, and was noted to have elevated troponin.  She was started on heparin gtt and taken to the cath lab where cath showed mid LAD ISR, circumflex, and RCA stenosis.  She is being transferred to [facility] for eval for high risk PCI versus CABG.        Interval history / Subjective:   No chest pain   No sob     No results found.   PCI  with stenting with RCA      Assessment & Plan:      #CAD s/p past LAD stent  #NSTEMI  -tx from [facility] for multivessel disease with consideration for high risk PCI vs. CABG  -continue heparin gtt, ASA and statin  -consult cardiology and cardiovascular surgery :-She went for [facility] on  with Dr. ROSEN  , MENDOZA to the RCA.   Plan is for staged PCI to LAD and circumflex on Monday      #HTN  -continue home norvasc     #glaucoma  -continue home eye drops     #depression  -continue home celexa     #GERD  -PPD          Code status: full   Prophylaxis: heparin   Care Plan discussed with: pt, rn, cm, pt/ot   Anticipated Disposition: TBD   Central Line:                Review of Systems:   Pertinent items are noted in HPI.         Vital Signs:    Last 24hrs VS reviewed since prior progress note. Most recent are:  Vitals:    25 1104   BP: (!) 117/37   Pulse: 70   Resp: 17   Temp: 97.7 °F (36.5 °C)   SpO2: 95%         Intake/Output Summary (Last 24 hours) at 2025 1153  Last data filed at 2025 0843  Gross per 24 hour   Intake 1000 ml   Output 5 ml   Net 995 ml        Physical Examination:      I had a face to face encounter with this patient and independently examined them on 6/20/2025 as outlined below:          General : alert x 3, awake, no acute distress,   HEENT: PEERL, EOMI, moist mucus membrane, TM clear  Neck: supple, no JVD, no meningeal signs  Chest: Clear to auscultation bilaterally   CVS: S1 S2 heard, Capillary refill less than 2 seconds  Abd: soft/ non tender, non distended, BS physiological,   Ext: no clubbing, no cyanosis, no edema, brisk 2+ DP pulses  Neuro/Psych: pleasant mood and affect, CN 2-12 grossly intact, sensory grossly within normal limit, Strength 5/5 in all extremities, DTR 1+ x 4  Skin: warm            Data Review:    Review and/or order of clinical lab test    I have independently reviewed and interpreted patient's lab and all other diagnostic data    Notes reviewed from all clinical/nonclinical/nursing services involved in patient's clinical care. Care coordination discussions were held with appropriate clinical/nonclinical/ nursing providers based on care coordination needs.     Labs:     Recent Labs     06/18/25  0328 06/19/25  0518   WBC 7.0 5.6   HGB 11.1* 11.3*   HCT 35.0 35.9    191     Recent Labs     06/18/25  0328 06/20/25  0521    141   K 4.5 4.2    112*   CO2 30 23   BUN 15 16     No results for input(s): \"ALT\", \"TP\", \"GLOB\", \"GGT\" in the last 72 hours.    Invalid input(s): \"SGOT\", \"GPT\", \"AP\", \"TBIL\", \"TBILI\", \"ALB\", \"AML\", \"AMYP\", \"LPSE\", \"HLPSE\"    Recent Labs     06/17/25  1325   INR 1.1   APTT 26.8      No results for input(s): \"TIBC\" in the last 72 hours.    Invalid input(s): \"FE\", \"PSAT\", \"FERR\"   No results found for: \"RBCF\"   No results for input(s): \"PH\", \"PCO2\", \"PO2\" in the last 72 hours.  No results for input(s): \"CPK\" in the last 72 hours.    Invalid input(s): \"CPKMB\", \"CKNDX\", \"TROIQ\"  Lab Results   Component Value Date/Time    CHOL 130 06/18/2025 03:28 AM    HDL 36 06/18/2025 03:28 AM    LDL 61.8 06/18/2025 03:28 AM    LDL 67

## 2025-06-20 NOTE — CARDIO/PULMONARY
Cardiac rehab: Met with pt and family at bedside. Pt has chosen PCI planned for this upcoming Monday. Advised that a referral will be sent to [facility] for cardiac rehab, she confirms this is her location of choice.

## 2025-06-20 NOTE — PROGRESS NOTES
TRANSFER - IN REPORT:    Verbal report received from Keaton Alatorre on Sierra Baca , from the Cardiac Cath lab, for routine progression of care. Report consisted of patient’s Situation, Background, Assessment and Recommendations(SBAR). Opportunity for questions and clarification was provided. Assessment completed upon patient’s arrival to Cardiac Cath Lab RECOVERY AREA and care assumed.    Cardiac Cath Lab Recovery Arrival Note:     Sierra Baca arrived to [facility] recovery area.  Patient procedure= [facility] with PCI. Patient on cardiac monitor, non-invasive blood pressure, Patient status doing well without problems. Patient is A&Ox 4. Patient reports no complaints. Procedure site without any bleeding and no hematoma.

## 2025-06-21 VITALS
BODY MASS INDEX: 21.52 KG/M2 | SYSTOLIC BLOOD PRESSURE: 124 MMHG | OXYGEN SATURATION: 97 % | DIASTOLIC BLOOD PRESSURE: 36 MMHG | HEIGHT: 65 IN | TEMPERATURE: 97.9 F | HEART RATE: 72 BPM | RESPIRATION RATE: 14 BRPM | WEIGHT: 129.19 LBS

## 2025-06-21 PROCEDURE — 6370000000 HC RX 637 (ALT 250 FOR IP): Performed by: NURSE PRACTITIONER

## 2025-06-21 PROCEDURE — 2500000003 HC RX 250 WO HCPCS: Performed by: HOSPITALIST

## 2025-06-21 PROCEDURE — 6360000002 HC RX W HCPCS: Performed by: HOSPITALIST

## 2025-06-21 PROCEDURE — 2500000003 HC RX 250 WO HCPCS: Performed by: FAMILY MEDICINE

## 2025-06-21 PROCEDURE — 6370000000 HC RX 637 (ALT 250 FOR IP): Performed by: FAMILY MEDICINE

## 2025-06-21 RX ORDER — CLOPIDOGREL BISULFATE 75 MG/1
75 TABLET ORAL DAILY
Qty: 30 TABLET | Refills: 3 | Status: SHIPPED | OUTPATIENT
Start: 2025-06-22

## 2025-06-21 RX ADMIN — WATER 1000 MG: 1 INJECTION INTRAMUSCULAR; INTRAVENOUS; SUBCUTANEOUS at 10:40

## 2025-06-21 RX ADMIN — ASPIRIN 81 MG: 81 TABLET, COATED ORAL at 08:04

## 2025-06-21 RX ADMIN — SODIUM CHLORIDE, PRESERVATIVE FREE 10 ML: 5 INJECTION INTRAVENOUS at 08:05

## 2025-06-21 RX ADMIN — CITALOPRAM HYDROBROMIDE 20 MG: 20 TABLET ORAL at 08:04

## 2025-06-21 RX ADMIN — ATORVASTATIN CALCIUM 40 MG: 40 TABLET, FILM COATED ORAL at 08:04

## 2025-06-21 RX ADMIN — CLOPIDOGREL BISULFATE 75 MG: 75 TABLET, FILM COATED ORAL at 08:04

## 2025-06-21 RX ADMIN — PANTOPRAZOLE SODIUM 40 MG: 40 TABLET, DELAYED RELEASE ORAL at 05:19

## 2025-06-21 RX ADMIN — BRIMONIDINE TARTRATE 1 DROP: 2 SOLUTION OPHTHALMIC at 08:05

## 2025-06-21 RX ADMIN — AMLODIPINE BESYLATE 5 MG: 5 TABLET ORAL at 08:04

## 2025-06-21 RX ADMIN — TIMOLOL MALEATE 1 DROP: 5 SOLUTION OPHTHALMIC at 08:05

## 2025-06-21 NOTE — DISCHARGE INSTRUCTIONS
Discharge Instructions       PATIENT ID: Sierra Baca  MRN: [id number]   YOB: 1945    DATE OF ADMISSION: [unfilled]    DATE OF DISCHARGE: 6/21/2025    PRIMARY CARE PROVIDER: @PCP@     ATTENDING PHYSICIAN: [unfilled]  DISCHARGING PROVIDER: Minna Mandel MD    To contact this individual call [phone] and ask the  to page.   If unavailable ask to be transferred the Adult Hospitalist Department.    DISCHARGE DIAGNOSES coronary artery disease     CONSULTATIONS: [unfilled]    PROCEDURES/SURGERIES: Procedure(s):  Percutaneous coronary intervention  Left heart cath / coronary angiography    PENDING TEST RESULTS:   At the time of discharge the following test results are still pending: none     FOLLOW UP APPOINTMENTS:   [unfilled]     ADDITIONAL CARE RECOMMENDATIONS:   Follow up PCP in one week  Follow up cardiologist   You should remain on aspirin and Plavix (Clopidogrel ) for one year, then can change to aspirin life long     DIET: cardiac diet      ACTIVITY: activity as tolerated          Radiology      DISCHARGE MEDICATIONS:   See Medication Reconciliation Form    It is important that you take the medication exactly as they are prescribed.   Keep your medication in the bottles provided by the pharmacist and keep a list of the medication names, dosages, and times to be taken in your wallet.   Do not take other medications without consulting your doctor.       NOTIFY YOUR PHYSICIAN FOR ANY OF THE FOLLOWING:   Fever over 101 degrees for 24 hours.   Chest pain, shortness of breath, fever, chills, nausea, vomiting, diarrhea, change in mentation, falling, weakness, bleeding. Severe pain or pain not relieved by medications.  Or, any other signs or symptoms that you may have questions about.      DISPOSITION:    Home With:   OT  PT  HH  RN       SNF/Inpatient Rehab/LTAC    Independent/assisted living    Hospice   x Other:     CDMP Checked:   Yes x     PROBLEM LIST Updated:  Yes s       Signed:   Minna Mandel

## 2025-06-21 NOTE — DISCHARGE SUMMARY
Discharge Summary       PATIENT ID: Sierra Baca  MRN: [id number]   YOB: 1945    DATE OF ADMISSION: 6/18/2025 10:20 PM    DATE OF DISCHARGE: 6/21/2025   PRIMARY CARE PROVIDER: Melani Campos MD     ATTENDING PHYSICIAN: MINNA PARSONS MD   DISCHARGING PROVIDER: Minna aPrsons MD    To contact this individual call [phone] and ask the  to page.  If unavailable ask to be transferred the Adult Hospitalist Department.    CONSULTATIONS: IP CONSULT TO CARDIOLOGY  IP CONSULT TO CARDIOVASCULAR SURGERY  IP CONSULT TO CARDIAC REHAB    PROCEDURES/SURGERIES: Procedure(s):  Percutaneous coronary intervention  Left heart cath / coronary angiography    ADMITTING DIAGNOSES & HOSPITAL COURSE:   Sierra Baca is a 79 y.o. female with a pmhx CAD, COPD, GERD and depression who presents as a transfer from [facility] for NSTEMI with need for high risk PCI vs. CABG.  She initially presented to [facility] on 6/17 with chest pain, and was noted to have elevated troponin.  She was started on heparin gtt and taken to the cath lab where cath showed mid LAD ISR, circumflex, and RCA stenosis.  She is being transferred to [facility] for eval for high risk PCI versus CABG.         DISCHARGE DIAGNOSES / PLAN:       #CAD s/p past LAD stent  #NSTEMI  -tx from [facility] for multivessel disease with consideration for high risk PCI vs. CABG  -continue heparin gtt, ASA and statin  -consult cardiology and cardiovascular surgery :-She went for [facility] on 6/19 with Dr. ROSEN 6/19 , MENDOZA to the RCA.    staged PCI to LAD and circumflex 6/20  Stable to home : discussed with cardiologist, continue asa and plavix one year.   Not add BB for now, will address in office per Dr. Alva      #HTN  -continue home norvasc     #glaucoma  -continue home eye drops     #depression  -continue home celexa     #GERD  -PPD       PENDING TEST RESULTS:   At the time of discharge the following test results are still pending: none     FOLLOW UP APPOINTMENTS:    @[facility]@

## 2025-06-21 NOTE — PROGRESS NOTES
1930: Bedside and Verbal shift change report given to HERNESTO Nagel (oncoming nurse) by HERNESTO Rao (offgoing nurse). Report included the following information Nurse Handoff Report, Index, Adult Overview, Intake/Output, MAR, Recent Results, and Cardiac Rhythm  .      2100: TR band removed, R radial site clean, dry, and intact with no bleeding or hematoma noted.    0730: Bedside and Verbal shift change report given to HERNESTO Zuniga (oncoming nurse) by HERNESTO Nagel (offgoing nurse). Report included the following information Nurse Handoff Report, Index, Adult Overview, Intake/Output, MAR, Recent Results, and Cardiac Rhythm  .        Problem: Discharge Planning  Goal: Discharge to home or other facility with appropriate resources  6/20/2025 2121 by Shona Zavala RN  Outcome: Progressing  6/20/2025 1852 by Jalen Fairbanks RN  Outcome: Progressing     Problem: Safety - Adult  Goal: Free from fall injury  6/20/2025 2121 by Shona Zavala RN  Outcome: Progressing  6/20/2025 1852 by Jalen Fairbanks RN  Outcome: Progressing     Problem: Skin/Tissue Integrity  Goal: Skin integrity remains intact  Description: 1.  Monitor for areas of redness and/or skin breakdown  2.  Assess vascular access sites hourly  3.  Every 4-6 hours minimum:  Change oxygen saturation probe site  4.  Every 4-6 hours:  If on nasal continuous positive airway pressure, respiratory therapy assess nares and determine need for appliance change or resting period  Outcome: Progressing     Problem: Physical Therapy - Adult  Goal: By Discharge: Performs mobility at highest level of function for planned discharge setting.  See evaluation for individualized goals.  Description: FUNCTIONAL STATUS PRIOR TO ADMISSION: Patient was independent and active without use of DME.    HOME SUPPORT PRIOR TO ADMISSION: The patient lived with family but did not require assistance.    Physical Therapy Goals  Initiated 6/20/2025  1.  Patient will move from supine to sit and sit to

## 2025-06-22 LAB
ECHO BSA: 1.62 M2
VAS LEFT CCA DIST EDV: 6.9 CM/S
VAS LEFT CCA DIST PSV: 90.4 CM/S
VAS LEFT CCA PROX EDV: 7.8 CM/S
VAS LEFT CCA PROX PSV: 101 CM/S
VAS LEFT ECA EDV: 0 CM/S
VAS LEFT ECA PSV: 117.9 CM/S
VAS LEFT ICA DIST EDV: 14.5 CM/S
VAS LEFT ICA DIST PSV: 74 CM/S
VAS LEFT ICA MID EDV: 17.6 CM/S
VAS LEFT ICA MID PSV: 87.6 CM/S
VAS LEFT ICA PROX EDV: 18.8 CM/S
VAS LEFT ICA PROX PSV: 94.9 CM/S
VAS LEFT ICA/CCA PSV: 1.1 NO UNITS
VAS LEFT VERTEBRAL EDV: 8 CM/S
VAS LEFT VERTEBRAL PSV: 42.2 CM/S
VAS RIGHT CCA DIST EDV: 12.4 CM/S
VAS RIGHT CCA DIST PSV: 81.6 CM/S
VAS RIGHT CCA PROX EDV: 10.8 CM/S
VAS RIGHT CCA PROX PSV: 94.3 CM/S
VAS RIGHT ECA EDV: 0 CM/S
VAS RIGHT ECA PSV: 196 CM/S
VAS RIGHT ICA DIST EDV: 17 CM/S
VAS RIGHT ICA DIST PSV: 83.7 CM/S
VAS RIGHT ICA MID EDV: 26.1 CM/S
VAS RIGHT ICA MID PSV: 128.4 CM/S
VAS RIGHT ICA PROX EDV: 24.3 CM/S
VAS RIGHT ICA PROX PSV: 124.7 CM/S
VAS RIGHT ICA/CCA PSV: 1.6 NO UNITS
VAS RIGHT VERTEBRAL EDV: 13.9 CM/S
VAS RIGHT VERTEBRAL PSV: 96.2 CM/S

## 2025-06-23 DIAGNOSIS — I21.4 ACUTE MYOCARDIAL INFARCTION, SUBENDOCARDIAL INFARCTION, INITIAL EPISODE OF CARE (HCC): Primary | ICD-10-CM

## 2025-06-23 DIAGNOSIS — Z95.5 STENTED CORONARY ARTERY: ICD-10-CM

## 2025-06-23 LAB
ACT BLD: 262 SECS (ref 79–138)
ACT BLD: 262 SECS (ref 79–138)
ACT BLD: 331 SECS (ref 79–138)
ACT BLD: 406 SECS (ref 79–138)
EKG ATRIAL RATE: 70 BPM
EKG DIAGNOSIS: NORMAL
EKG P AXIS: 45 DEGREES
EKG P-R INTERVAL: 136 MS
EKG Q-T INTERVAL: 450 MS
EKG QRS DURATION: 92 MS
EKG QTC CALCULATION (BAZETT): 486 MS
EKG R AXIS: -11 DEGREES
EKG T AXIS: 28 DEGREES
EKG VENTRICULAR RATE: 70 BPM

## 2025-06-23 PROCEDURE — 93010 ELECTROCARDIOGRAM REPORT: CPT | Performed by: INTERNAL MEDICINE

## 2025-08-28 DIAGNOSIS — I10 ESSENTIAL (PRIMARY) HYPERTENSION: ICD-10-CM

## 2025-08-28 RX ORDER — AMLODIPINE BESYLATE 5 MG/1
5 TABLET ORAL EVERY MORNING
Qty: 90 TABLET | Refills: 3 | Status: SHIPPED | OUTPATIENT
Start: 2025-08-28

## 2025-09-03 ENCOUNTER — OFFICE VISIT (OUTPATIENT)
Age: 80
End: 2025-09-03
Payer: MEDICARE

## 2025-09-03 VITALS
HEART RATE: 84 BPM | WEIGHT: 127.2 LBS | SYSTOLIC BLOOD PRESSURE: 134 MMHG | DIASTOLIC BLOOD PRESSURE: 64 MMHG | BODY MASS INDEX: 21.19 KG/M2 | OXYGEN SATURATION: 95 % | HEIGHT: 65 IN | RESPIRATION RATE: 16 BRPM

## 2025-09-03 DIAGNOSIS — I25.10 CORONARY ARTERY DISEASE INVOLVING NATIVE CORONARY ARTERY OF NATIVE HEART WITHOUT ANGINA PECTORIS: Primary | ICD-10-CM

## 2025-09-03 DIAGNOSIS — E78.2 MIXED HYPERLIPIDEMIA: ICD-10-CM

## 2025-09-03 DIAGNOSIS — I10 ESSENTIAL (PRIMARY) HYPERTENSION: ICD-10-CM

## 2025-09-03 PROCEDURE — 1036F TOBACCO NON-USER: CPT

## 2025-09-03 PROCEDURE — 1160F RVW MEDS BY RX/DR IN RCRD: CPT

## 2025-09-03 PROCEDURE — 93005 ELECTROCARDIOGRAM TRACING: CPT

## 2025-09-03 PROCEDURE — 99214 OFFICE O/P EST MOD 30 MIN: CPT

## 2025-09-03 PROCEDURE — G8427 DOCREV CUR MEDS BY ELIG CLIN: HCPCS

## 2025-09-03 PROCEDURE — G8399 PT W/DXA RESULTS DOCUMENT: HCPCS

## 2025-09-03 PROCEDURE — 1090F PRES/ABSN URINE INCON ASSESS: CPT

## 2025-09-03 PROCEDURE — 3075F SYST BP GE 130 - 139MM HG: CPT

## 2025-09-03 PROCEDURE — 1159F MED LIST DOCD IN RCRD: CPT

## 2025-09-03 PROCEDURE — 1123F ACP DISCUSS/DSCN MKR DOCD: CPT

## 2025-09-03 PROCEDURE — G8420 CALC BMI NORM PARAMETERS: HCPCS

## 2025-09-03 PROCEDURE — 1126F AMNT PAIN NOTED NONE PRSNT: CPT

## 2025-09-03 PROCEDURE — 93010 ELECTROCARDIOGRAM REPORT: CPT

## 2025-09-03 PROCEDURE — 3078F DIAST BP <80 MM HG: CPT

## 2025-09-03 RX ORDER — DORZOLAMIDE HYDROCHLORIDE AND TIMOLOL MALEATE 20; 5 MG/ML; MG/ML
1 SOLUTION/ DROPS OPHTHALMIC 3 TIMES DAILY
COMMUNITY

## (undated) DEVICE — HEART CATH-SFMC: Brand: MEDLINE INDUSTRIES, INC.

## (undated) DEVICE — WASTE KIT - ST MARY: Brand: MEDLINE INDUSTRIES, INC.

## (undated) DEVICE — GUIDEWIRE VASC J 3 MM 0.035 INX210 CM FIX COR INQWIRE

## (undated) DEVICE — CATHETER DIAG 5FR L100CM LUMN ID0.047IN JR4 CRV 0 SIDE H

## (undated) DEVICE — PADPRO DEFIBRILLATION/PACING/CARDIOVERSION/MONITORING ELECTRODES, ADULT/CHILD GREATER THAN 10 KG RADIOTRANSPARENT ELECTRODE, PHYSIO-CONTROL QUIK-COMBO (M) 60" (152 CM): Brand: PADPRO

## (undated) DEVICE — CATH BLLN ANGIO 2.75X6MM NC EUPHORIA RX

## (undated) DEVICE — CONNECTOR FLD DISPNS FOR FILL UD SYRINGES

## (undated) DEVICE — ELECTRODE,RADIOTRANSLUCENT,FOAM,5PK: Brand: MEDLINE

## (undated) DEVICE — GLIDESHEATH SLENDER STAINLESS STEEL KIT: Brand: GLIDESHEATH SLENDER

## (undated) DEVICE — SPLINT WR VELC FOAM NEUT POS DISP FOR RAD ART ACC SFT STRP

## (undated) DEVICE — KIT AT-X65 ANGIOTOUCH HAND CONTROLLER

## (undated) DEVICE — KIT MED IMAG CNTRST AGNT W/ IOPAMIDOL REUSE

## (undated) DEVICE — SPECIAL PROCEDURE DRAPE 32" X 34": Brand: SPECIAL PROCEDURE DRAPE

## (undated) DEVICE — KIT HND CTRL 3 W STPCOCK ROT END 54IN PREM HI PRSS TBNG AT

## (undated) DEVICE — TR BAND RADIAL ARTERY COMPRESSION DEVICE: Brand: TR BAND

## (undated) DEVICE — CATH BLLN ANGIO 3.50X6MM NC EUPHORIA RX

## (undated) DEVICE — Device: Brand: ASAHI SION BLACK

## (undated) DEVICE — CATH BLLN ANGIO 2X10MM SC EUPHORA RX

## (undated) DEVICE — Device: Brand: EAGLE EYE PLATINUM RX DIGITAL IVUS CATHETER

## (undated) DEVICE — 3M™ TEGADERM™ TRANSPARENT FILM DRESSING FRAME STYLE, 1626W, 4 IN X 4-3/4 IN (10 CM X 12 CM), 50/CT 4CT/CASE: Brand: 3M™ TEGADERM™

## (undated) DEVICE — CATHETER KIT JL4 JR4 5FR 100CM 145 PGTL DXTERITY

## (undated) DEVICE — CATHETER GUID 6FR 0.071IN COR AMPLATZ L 0.75 MID

## (undated) DEVICE — CATH BLLN ANGIO 3.50X6MM SC EUPHORA RX

## (undated) DEVICE — ANGIOGRAPHY KIT

## (undated) DEVICE — RUNTHROUGH NS EXTRA FLOPPY PTCA GUIDEWIRE: Brand: RUNTHROUGH

## (undated) DEVICE — HI-TORQUE VERSACORE MODIFIED J GUIDE WIRE SYSTEM 145 CM: Brand: HI-TORQUE VERSACORE

## (undated) DEVICE — CATHETER GUID 6FR L100CM DIA0.071IN NYL SHFT EBU3.5

## (undated) DEVICE — CUSTOM KT PTCA INFL DEV K05 00052M

## (undated) DEVICE — VALVE HEMSTAS W/ GWIRE INSRTN TOOL FOR 8FR DEV GRDIAN II

## (undated) DEVICE — KIT MFLD ISOLATN NACL CNTRST PRT TBNG SPIK W/ PRSS TRNSDUC

## (undated) DEVICE — GLIDESHEATH SLENDER ACCESS KIT: Brand: GLIDESHEATH SLENDER

## (undated) DEVICE — CATH BLLN ANGIO 3X27MM NC EUPHORIA RX

## (undated) DEVICE — Device

## (undated) DEVICE — TUBING HYDR IRR --

## (undated) DEVICE — CATH BLLN ANGIO 2.50X12MM SC EUPHORA RX

## (undated) DEVICE — CATH BLLN ANGIO 3.50X12MM NC EUPHORIA RX